# Patient Record
Sex: FEMALE | Race: WHITE | NOT HISPANIC OR LATINO
[De-identification: names, ages, dates, MRNs, and addresses within clinical notes are randomized per-mention and may not be internally consistent; named-entity substitution may affect disease eponyms.]

---

## 2018-05-16 ENCOUNTER — APPOINTMENT (OUTPATIENT)
Dept: ORTHOPEDIC SURGERY | Facility: CLINIC | Age: 68
End: 2018-05-16
Payer: MEDICARE

## 2018-05-16 VITALS — BODY MASS INDEX: 25.71 KG/M2 | HEIGHT: 66 IN | WEIGHT: 160 LBS

## 2018-05-16 DIAGNOSIS — Z78.9 OTHER SPECIFIED HEALTH STATUS: ICD-10-CM

## 2018-05-16 DIAGNOSIS — M25.561 PAIN IN RIGHT KNEE: ICD-10-CM

## 2018-05-16 PROCEDURE — 73560 X-RAY EXAM OF KNEE 1 OR 2: CPT | Mod: LT

## 2018-05-16 PROCEDURE — 20610 DRAIN/INJ JOINT/BURSA W/O US: CPT | Mod: RT

## 2018-05-16 PROCEDURE — 73562 X-RAY EXAM OF KNEE 3: CPT | Mod: RT

## 2018-05-16 PROCEDURE — 99204 OFFICE O/P NEW MOD 45 MIN: CPT | Mod: 25

## 2018-08-23 ENCOUNTER — OUTPATIENT (OUTPATIENT)
Dept: OUTPATIENT SERVICES | Facility: HOSPITAL | Age: 68
LOS: 1 days | Discharge: HOME | End: 2018-08-23

## 2018-08-23 VITALS
HEART RATE: 103 BPM | OXYGEN SATURATION: 96 % | WEIGHT: 152.12 LBS | RESPIRATION RATE: 18 BRPM | TEMPERATURE: 99 F | SYSTOLIC BLOOD PRESSURE: 154 MMHG | DIASTOLIC BLOOD PRESSURE: 96 MMHG | HEIGHT: 65 IN

## 2018-08-23 DIAGNOSIS — F41.9 ANXIETY DISORDER, UNSPECIFIED: ICD-10-CM

## 2018-08-23 DIAGNOSIS — Z01.818 ENCOUNTER FOR OTHER PREPROCEDURAL EXAMINATION: ICD-10-CM

## 2018-08-23 DIAGNOSIS — Z96.659 PRESENCE OF UNSPECIFIED ARTIFICIAL KNEE JOINT: Chronic | ICD-10-CM

## 2018-08-23 DIAGNOSIS — M25.569 PAIN IN UNSPECIFIED KNEE: ICD-10-CM

## 2018-08-23 LAB
ANION GAP SERPL CALC-SCNC: 16 MMOL/L — SIGNIFICANT CHANGE UP (ref 5–17)
APPEARANCE UR: CLEAR — SIGNIFICANT CHANGE UP
APTT BLD: 29.7 SEC — SIGNIFICANT CHANGE UP (ref 27.5–37.4)
BASOPHILS # BLD AUTO: 0.09 K/UL — SIGNIFICANT CHANGE UP (ref 0–0.2)
BASOPHILS NFR BLD AUTO: 0.6 % — SIGNIFICANT CHANGE UP (ref 0–2)
BILIRUB UR-MCNC: ABNORMAL
BUN SERPL-MCNC: 13 MG/DL — SIGNIFICANT CHANGE UP (ref 7–23)
CALCIUM SERPL-MCNC: 8.8 MG/DL — SIGNIFICANT CHANGE UP (ref 8.5–10.1)
CHLORIDE SERPL-SCNC: 97 MMOL/L — SIGNIFICANT CHANGE UP (ref 96–108)
CO2 SERPL-SCNC: 26 MMOL/L — SIGNIFICANT CHANGE UP (ref 22–31)
COLOR SPEC: YELLOW — SIGNIFICANT CHANGE UP
CREAT SERPL-MCNC: 0.74 MG/DL — SIGNIFICANT CHANGE UP (ref 0.5–1.3)
DIFF PNL FLD: ABNORMAL
EOSINOPHIL # BLD AUTO: 0.01 K/UL — SIGNIFICANT CHANGE UP (ref 0–0.5)
EOSINOPHIL NFR BLD AUTO: 0.1 % — SIGNIFICANT CHANGE UP (ref 0–6)
GLUCOSE SERPL-MCNC: 105 MG/DL — HIGH (ref 70–99)
GLUCOSE UR QL: NEGATIVE MG/DL — SIGNIFICANT CHANGE UP
HBA1C BLD-MCNC: 5.3 % — SIGNIFICANT CHANGE UP (ref 4–5.6)
HCT VFR BLD CALC: 44 % — SIGNIFICANT CHANGE UP (ref 34.5–45)
HGB BLD-MCNC: 15.3 G/DL — SIGNIFICANT CHANGE UP (ref 11.5–15.5)
IMM GRANULOCYTES NFR BLD AUTO: 0.3 % — SIGNIFICANT CHANGE UP (ref 0–1.5)
INR BLD: 1.04 RATIO — SIGNIFICANT CHANGE UP (ref 0.88–1.16)
KETONES UR-MCNC: ABNORMAL
LEUKOCYTE ESTERASE UR-ACNC: ABNORMAL
LYMPHOCYTES # BLD AUTO: 15.6 % — SIGNIFICANT CHANGE UP (ref 13–44)
LYMPHOCYTES # BLD AUTO: 2.42 K/UL — SIGNIFICANT CHANGE UP (ref 1–3.3)
MCHC RBC-ENTMCNC: 31 PG — SIGNIFICANT CHANGE UP (ref 27–34)
MCHC RBC-ENTMCNC: 34.8 GM/DL — SIGNIFICANT CHANGE UP (ref 32–36)
MCV RBC AUTO: 89.2 FL — SIGNIFICANT CHANGE UP (ref 80–100)
MONOCYTES # BLD AUTO: 0.84 K/UL — SIGNIFICANT CHANGE UP (ref 0–0.9)
MONOCYTES NFR BLD AUTO: 5.4 % — SIGNIFICANT CHANGE UP (ref 2–14)
NEUTROPHILS # BLD AUTO: 12.09 K/UL — HIGH (ref 1.8–7.4)
NEUTROPHILS NFR BLD AUTO: 78 % — HIGH (ref 43–77)
NITRITE UR-MCNC: POSITIVE
PH UR: 6 — SIGNIFICANT CHANGE UP (ref 5–8)
PLATELET # BLD AUTO: 450 K/UL — HIGH (ref 150–400)
POTASSIUM SERPL-MCNC: 3.6 MMOL/L — SIGNIFICANT CHANGE UP (ref 3.5–5.3)
POTASSIUM SERPL-SCNC: 3.6 MMOL/L — SIGNIFICANT CHANGE UP (ref 3.5–5.3)
PROT UR-MCNC: 30 MG/DL
PROTHROM AB SERPL-ACNC: 11.4 SEC — SIGNIFICANT CHANGE UP (ref 9.8–12.7)
RBC # BLD: 4.93 M/UL — SIGNIFICANT CHANGE UP (ref 3.8–5.2)
RBC # FLD: 12.2 % — SIGNIFICANT CHANGE UP (ref 10.3–14.5)
SODIUM SERPL-SCNC: 139 MMOL/L — SIGNIFICANT CHANGE UP (ref 135–145)
SP GR SPEC: 1.03 — HIGH (ref 1.01–1.02)
UROBILINOGEN FLD QL: 1 MG/DL
WBC # BLD: 15.5 K/UL — HIGH (ref 3.8–10.5)
WBC # FLD AUTO: 15.5 K/UL — HIGH (ref 3.8–10.5)

## 2018-08-23 RX ORDER — SODIUM CHLORIDE 9 MG/ML
3 INJECTION INTRAMUSCULAR; INTRAVENOUS; SUBCUTANEOUS EVERY 8 HOURS
Qty: 0 | Refills: 0 | Status: DISCONTINUED | OUTPATIENT
Start: 2018-09-06 | End: 2018-09-10

## 2018-08-23 NOTE — OCCUPATIONAL THERAPY INITIAL EVALUATION ADULT - SOCIAL CONCERNS
Complex psychosocial needs/coping issues/As per patient, "My  works full time and I will have no one around after surgery to help me with any of my daily tasks and needs".

## 2018-08-23 NOTE — PHYSICAL THERAPY INITIAL EVALUATION ADULT - ADDITIONAL COMMENTS
Pt lives in a private house c one step to get in and there is 3 flights of stairs c willie rails to negotiate at home.  will be able to assist in post-op care at home. Pt has high anxiety.

## 2018-08-23 NOTE — PHYSICAL THERAPY INITIAL EVALUATION ADULT - LIVES WITH, PROFILE
New onset T2D referred to ED due to high blood glucose value in blood work after office visit yesterday spouse

## 2018-08-23 NOTE — H&P PST ADULT - HISTORY OF PRESENT ILLNESS
66 yo female s/p right knee arthroplasty 2014- received poor post op therapy - had several falls and surgery to the knee. evaluated - fibrosis of ortho device- scheduled for  revision of right knee arhroplasty

## 2018-08-23 NOTE — PHYSICAL THERAPY INITIAL EVALUATION ADULT - CRITERIA FOR SKILLED THERAPEUTIC INTERVENTIONS
impairments found/anticipated equipment needs at discharge/functional limitations in following categories/risk reduction/prevention/therapy frequency

## 2018-08-23 NOTE — H&P PST ADULT - ASSESSMENT
right knee ortho device fibrosis  CAPRINI SCORE    AGE RELATED RISK FACTORS                                                       MOBILITY RELATED FACTORS  [ ] Age 41-60 years                                            (1 Point)                  [ ] Bed rest                                                        (1 Point)  [x ] Age: 61-74 years                                           (2 Points)                [ ] Plaster cast                                                   (2 Points)  [ ] Age= 75 years                                              (3 Points)                 [ ] Bed bound for more than 72 hours                   (2 Points)    DISEASE RELATED RISK FACTORS                                               GENDER SPECIFIC FACTORS  [ ] Edema in the lower extremities                       (1 Point)                  [ ] Pregnancy                                                     (1 Point)  [ ] Varicose veins                                               (1 Point)                  [ ] Post-partum < 6 weeks                                   (1 Point)             [ x] BMI > 25 Kg/m2                                            (1 Point)                  [ ] Hormonal therapy  or oral contraception            (1 Point)                 [ ] Sepsis (in the previous month)                        (1 Point)                  [ ] History of pregnancy complications  [ ] Pneumonia or serious lung disease                                               [ ] Unexplained or recurrent                       (1 Point)           (in the previous month)                               (1 Point)  [ ] Abnormal pulmonary function test                     (1 Point)                 SURGERY RELATED RISK FACTORS  [ ] Acute myocardial infarction                              (1 Point)                 [ ]  Section                                            (1 Point)  [ ] Congestive heart failure (in the previous month)  (1 Point)                 [ ] Minor surgery                                                 (1 Point)   [ ] Inflammatory bowel disease                             (1 Point)                 [ ] Arthroscopic surgery                                        (2 Points)  [ ] Central venous access                                    (2 Points)                [ ] General surgery lasting more than 45 minutes   (2 Points)       [ ] Stroke (in the previous month)                          (5 Points)               [x ] Elective arthroplasty                                        (5 Points)                                                                                                                                               HEMATOLOGY RELATED FACTORS                                                 TRAUMA RELATED RISK FACTORS  [ ] Prior episodes of VTE                                     (3 Points)                 [ ] Fracture of the hip, pelvis, or leg                       (5 Points)  [ ] Positive family history for VTE                         (3 Points)                 [ ] Acute spinal cord injury (in the previous month)  (5 Points)  [ ] Prothrombin 91055 A                                      (3 Points)                 [ ] Paralysis  (less than 1 month)                          (5 Points)  [ ] Factor V Leiden                                             (3 Points)                 [ ] Multiple Trauma within 1 month                         (5 Points)  [ ] Lupus anticoagulants                                     (3 Points)                                                           [ ] Anticardiolipin antibodies                                (3 Points)                                                       [ ] High homocysteine in the blood                      (3 Points)                                             [ ] Other congenital or acquired thrombophilia       (3 Points)                                                [ ] Heparin induced thrombocytopenia                  (3 Points)                                          Total Score [      8    ]

## 2018-08-23 NOTE — OCCUPATIONAL THERAPY INITIAL EVALUATION ADULT - STANDING BALANCE: STATIC
good balance conducted a detailed discussion... I had a detailed discussion with the patient and/or guardian regarding the historical points, exam findings, and any diagnostic results supporting the discharge/admit diagnosis.

## 2018-08-23 NOTE — PHYSICAL THERAPY INITIAL EVALUATION ADULT - IMPAIRMENTS FOUND, PT EVAL
aerobic capacity/endurance/gait, locomotion, and balance/muscle strength/ROM/ergonomics and body mechanics

## 2018-08-23 NOTE — OCCUPATIONAL THERAPY INITIAL EVALUATION ADULT - TRANSFER SAFETY CONCERNS NOTED: SIT/STAND, REHAB EVAL
decreased weight-shifting ability/decreased balance during turns/decreased step length/decreased sequencing ability

## 2018-08-23 NOTE — OCCUPATIONAL THERAPY INITIAL EVALUATION ADULT - ADDITIONAL COMMENTS
Patient lives in a private house with no steps to enter. Once inside, the patient has 9 steps (+) bilateral handrails to a platform and then another 9 steps (+) bilateral handrails to second floor where bedroom and bathroom is. The patient bathroom has a shower stall with a regular toilet. The patient reports having no DME inside of that bathroom. The patient ambulates with no device and does not own any devices for ambulation. The patient is R handed, wears glasses all the time and drives.

## 2018-08-23 NOTE — PATIENT PROFILE ADULT. - PSH
H/O: Hysterectomy (ICD9 V88.01)    H/O: Knee Surgery (ICD9 V45.89)    History of Tonsillectomy (ICD9 V45.89)    S/P knee replacement  Right  ( 2014 )

## 2018-08-23 NOTE — OCCUPATIONAL THERAPY INITIAL EVALUATION ADULT - FINE MOTOR COORDINATION, FINE MOTOR COORDINATION TESTS, OT EVAL
Patient-specific activity scoring scheme (Point to one number): 0 -------5-------- 10 (0) =Unable to perform activity (10) -- Able to perform activity at the same level as before injury or problem. Activity: Standing__5____, Activity: Walking_____5_______

## 2018-08-23 NOTE — H&P PST ADULT - PSH
H/O: Hysterectomy (ICD9 V88.01)    H/O: Knee Surgery (ICD9 V45.89)  several times  History of Tonsillectomy (ICD9 V45.89)    S/P knee replacement  Right  ( 2014 )

## 2018-08-24 LAB
CULTURE RESULTS: SIGNIFICANT CHANGE UP
MRSA PCR RESULT.: SIGNIFICANT CHANGE UP
S AUREUS DNA NOSE QL NAA+PROBE: SIGNIFICANT CHANGE UP
SPECIMEN SOURCE: SIGNIFICANT CHANGE UP

## 2018-09-05 RX ORDER — METOCLOPRAMIDE HCL 10 MG
10 TABLET ORAL EVERY 6 HOURS
Qty: 0 | Refills: 0 | Status: DISCONTINUED | OUTPATIENT
Start: 2018-09-06 | End: 2018-09-10

## 2018-09-05 RX ORDER — ONDANSETRON 8 MG/1
4 TABLET, FILM COATED ORAL EVERY 6 HOURS
Qty: 0 | Refills: 0 | Status: DISCONTINUED | OUTPATIENT
Start: 2018-09-06 | End: 2018-09-10

## 2018-09-05 RX ORDER — SENNA PLUS 8.6 MG/1
2 TABLET ORAL AT BEDTIME
Qty: 0 | Refills: 0 | Status: DISCONTINUED | OUTPATIENT
Start: 2018-09-06 | End: 2018-09-10

## 2018-09-05 RX ORDER — DOCUSATE SODIUM 100 MG
100 CAPSULE ORAL THREE TIMES A DAY
Qty: 0 | Refills: 0 | Status: DISCONTINUED | OUTPATIENT
Start: 2018-09-06 | End: 2018-09-10

## 2018-09-05 RX ORDER — ACETAMINOPHEN 500 MG
975 TABLET ORAL EVERY 8 HOURS
Qty: 0 | Refills: 0 | Status: COMPLETED | OUTPATIENT
Start: 2018-09-06 | End: 2018-09-08

## 2018-09-05 RX ORDER — MAGNESIUM HYDROXIDE 400 MG/1
30 TABLET, CHEWABLE ORAL DAILY
Qty: 0 | Refills: 0 | Status: DISCONTINUED | OUTPATIENT
Start: 2018-09-06 | End: 2018-09-10

## 2018-09-05 RX ORDER — ACETAMINOPHEN 500 MG
650 TABLET ORAL EVERY 6 HOURS
Qty: 0 | Refills: 0 | Status: DISCONTINUED | OUTPATIENT
Start: 2018-09-06 | End: 2018-09-10

## 2018-09-05 RX ORDER — POLYETHYLENE GLYCOL 3350 17 G/17G
17 POWDER, FOR SOLUTION ORAL DAILY
Qty: 0 | Refills: 0 | Status: DISCONTINUED | OUTPATIENT
Start: 2018-09-06 | End: 2018-09-10

## 2018-09-05 RX ORDER — OXYCODONE HYDROCHLORIDE 5 MG/1
10 TABLET ORAL EVERY 4 HOURS
Qty: 0 | Refills: 0 | Status: DISCONTINUED | OUTPATIENT
Start: 2018-09-06 | End: 2018-09-08

## 2018-09-05 RX ORDER — OXYCODONE HYDROCHLORIDE 5 MG/1
5 TABLET ORAL EVERY 4 HOURS
Qty: 0 | Refills: 0 | Status: DISCONTINUED | OUTPATIENT
Start: 2018-09-06 | End: 2018-09-08

## 2018-09-05 RX ORDER — TRAZODONE HCL 50 MG
50 TABLET ORAL AT BEDTIME
Qty: 0 | Refills: 0 | Status: DISCONTINUED | OUTPATIENT
Start: 2018-09-06 | End: 2018-09-10

## 2018-09-05 RX ORDER — ENOXAPARIN SODIUM 100 MG/ML
40 INJECTION SUBCUTANEOUS EVERY 24 HOURS
Qty: 0 | Refills: 0 | Status: DISCONTINUED | OUTPATIENT
Start: 2018-09-07 | End: 2018-09-10

## 2018-09-05 RX ORDER — BENZOCAINE AND MENTHOL 5; 1 G/100ML; G/100ML
1 LIQUID ORAL EVERY 6 HOURS
Qty: 0 | Refills: 0 | Status: DISCONTINUED | OUTPATIENT
Start: 2018-09-06 | End: 2018-09-10

## 2018-09-06 ENCOUNTER — APPOINTMENT (OUTPATIENT)
Dept: ORTHOPEDIC SURGERY | Facility: HOSPITAL | Age: 68
End: 2018-09-06

## 2018-09-06 ENCOUNTER — INPATIENT (INPATIENT)
Facility: HOSPITAL | Age: 68
LOS: 3 days | Discharge: NURSING, REHAB OR LONG-TERM CARE | End: 2018-09-10
Attending: ORTHOPAEDIC SURGERY | Admitting: ORTHOPAEDIC SURGERY
Payer: MEDICARE

## 2018-09-06 ENCOUNTER — RESULT REVIEW (OUTPATIENT)
Age: 68
End: 2018-09-06

## 2018-09-06 VITALS
OXYGEN SATURATION: 97 % | TEMPERATURE: 97 F | HEIGHT: 65 IN | WEIGHT: 152.12 LBS | SYSTOLIC BLOOD PRESSURE: 120 MMHG | RESPIRATION RATE: 17 BRPM | HEART RATE: 69 BPM | DIASTOLIC BLOOD PRESSURE: 66 MMHG

## 2018-09-06 DIAGNOSIS — T84.092A OTHER MECHANICAL COMPLICATION OF INTERNAL RIGHT KNEE PROSTHESIS, INITIAL ENCOUNTER: ICD-10-CM
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
## 2018-09-06 DIAGNOSIS — F41.9 ANXIETY DISORDER, UNSPECIFIED: ICD-10-CM

## 2018-09-06 DIAGNOSIS — Z96.659 PRESENCE OF UNSPECIFIED ARTIFICIAL KNEE JOINT: Chronic | ICD-10-CM

## 2018-09-06 DIAGNOSIS — F32.9 MAJOR DEPRESSIVE DISORDER, SINGLE EPISODE, UNSPECIFIED: ICD-10-CM

## 2018-09-06 DIAGNOSIS — M24.661 ANKYLOSIS, RIGHT KNEE: ICD-10-CM

## 2018-09-06 DIAGNOSIS — Z98.890 OTHER SPECIFIED POSTPROCEDURAL STATES: Chronic | ICD-10-CM

## 2018-09-06 LAB
ANION GAP SERPL CALC-SCNC: 7 MMOL/L — SIGNIFICANT CHANGE UP (ref 5–17)
BASOPHILS # BLD AUTO: 0.05 K/UL — SIGNIFICANT CHANGE UP (ref 0–0.2)
BASOPHILS NFR BLD AUTO: 0.7 % — SIGNIFICANT CHANGE UP (ref 0–2)
BUN SERPL-MCNC: 17 MG/DL — SIGNIFICANT CHANGE UP (ref 7–23)
CALCIUM SERPL-MCNC: 8.8 MG/DL — SIGNIFICANT CHANGE UP (ref 8.5–10.1)
CHLORIDE SERPL-SCNC: 109 MMOL/L — HIGH (ref 96–108)
CO2 SERPL-SCNC: 28 MMOL/L — SIGNIFICANT CHANGE UP (ref 22–31)
CREAT SERPL-MCNC: 0.87 MG/DL — SIGNIFICANT CHANGE UP (ref 0.5–1.3)
EOSINOPHIL # BLD AUTO: 0.17 K/UL — SIGNIFICANT CHANGE UP (ref 0–0.5)
EOSINOPHIL NFR BLD AUTO: 2.4 % — SIGNIFICANT CHANGE UP (ref 0–6)
GLUCOSE SERPL-MCNC: 146 MG/DL — HIGH (ref 70–99)
HCT VFR BLD CALC: 38.1 % — SIGNIFICANT CHANGE UP (ref 34.5–45)
HCT VFR BLD CALC: 40.1 % — SIGNIFICANT CHANGE UP (ref 34.5–45)
HGB BLD-MCNC: 12.6 G/DL — SIGNIFICANT CHANGE UP (ref 11.5–15.5)
HGB BLD-MCNC: 13.1 G/DL — SIGNIFICANT CHANGE UP (ref 11.5–15.5)
IMM GRANULOCYTES NFR BLD AUTO: 0.3 % — SIGNIFICANT CHANGE UP (ref 0–1.5)
INR BLD: 1.01 RATIO — SIGNIFICANT CHANGE UP (ref 0.88–1.16)
LYMPHOCYTES # BLD AUTO: 2.05 K/UL — SIGNIFICANT CHANGE UP (ref 1–3.3)
LYMPHOCYTES # BLD AUTO: 29 % — SIGNIFICANT CHANGE UP (ref 13–44)
MCHC RBC-ENTMCNC: 30.7 PG — SIGNIFICANT CHANGE UP (ref 27–34)
MCHC RBC-ENTMCNC: 31 PG — SIGNIFICANT CHANGE UP (ref 27–34)
MCHC RBC-ENTMCNC: 32.7 GM/DL — SIGNIFICANT CHANGE UP (ref 32–36)
MCHC RBC-ENTMCNC: 33.1 GM/DL — SIGNIFICANT CHANGE UP (ref 32–36)
MCV RBC AUTO: 93.8 FL — SIGNIFICANT CHANGE UP (ref 80–100)
MCV RBC AUTO: 93.9 FL — SIGNIFICANT CHANGE UP (ref 80–100)
MONOCYTES # BLD AUTO: 0.56 K/UL — SIGNIFICANT CHANGE UP (ref 0–0.9)
MONOCYTES NFR BLD AUTO: 7.9 % — SIGNIFICANT CHANGE UP (ref 2–14)
NEUTROPHILS # BLD AUTO: 4.21 K/UL — SIGNIFICANT CHANGE UP (ref 1.8–7.4)
NEUTROPHILS NFR BLD AUTO: 59.7 % — SIGNIFICANT CHANGE UP (ref 43–77)
NRBC # BLD: 0 /100 WBCS — SIGNIFICANT CHANGE UP (ref 0–0)
NRBC # BLD: 0 /100 WBCS — SIGNIFICANT CHANGE UP (ref 0–0)
PLATELET # BLD AUTO: 284 K/UL — SIGNIFICANT CHANGE UP (ref 150–400)
PLATELET # BLD AUTO: 301 K/UL — SIGNIFICANT CHANGE UP (ref 150–400)
POTASSIUM SERPL-MCNC: 4.2 MMOL/L — SIGNIFICANT CHANGE UP (ref 3.5–5.3)
POTASSIUM SERPL-SCNC: 4.2 MMOL/L — SIGNIFICANT CHANGE UP (ref 3.5–5.3)
PROTHROM AB SERPL-ACNC: 11 SEC — SIGNIFICANT CHANGE UP (ref 9.8–12.7)
RBC # BLD: 4.06 M/UL — SIGNIFICANT CHANGE UP (ref 3.8–5.2)
RBC # BLD: 4.27 M/UL — SIGNIFICANT CHANGE UP (ref 3.8–5.2)
RBC # FLD: 12.3 % — SIGNIFICANT CHANGE UP (ref 10.3–14.5)
RBC # FLD: 12.4 % — SIGNIFICANT CHANGE UP (ref 10.3–14.5)
SODIUM SERPL-SCNC: 144 MMOL/L — SIGNIFICANT CHANGE UP (ref 135–145)
WBC # BLD: 11.23 K/UL — HIGH (ref 3.8–10.5)
WBC # BLD: 7.06 K/UL — SIGNIFICANT CHANGE UP (ref 3.8–10.5)
WBC # FLD AUTO: 11.23 K/UL — HIGH (ref 3.8–10.5)
WBC # FLD AUTO: 7.06 K/UL — SIGNIFICANT CHANGE UP (ref 3.8–10.5)

## 2018-09-06 PROCEDURE — 27487 REVISE/REPLACE KNEE JOINT: CPT | Mod: RT

## 2018-09-06 PROCEDURE — 88305 TISSUE EXAM BY PATHOLOGIST: CPT | Mod: 26

## 2018-09-06 PROCEDURE — 73560 X-RAY EXAM OF KNEE 1 OR 2: CPT | Mod: 26,RT

## 2018-09-06 PROCEDURE — 88300 SURGICAL PATH GROSS: CPT | Mod: 26

## 2018-09-06 PROCEDURE — 88331 PATH CONSLTJ SURG 1 BLK 1SPC: CPT | Mod: 26

## 2018-09-06 RX ORDER — SODIUM CHLORIDE 9 MG/ML
1000 INJECTION, SOLUTION INTRAVENOUS
Qty: 0 | Refills: 0 | Status: DISCONTINUED | OUTPATIENT
Start: 2018-09-06 | End: 2018-09-06

## 2018-09-06 RX ORDER — ONDANSETRON 8 MG/1
4 TABLET, FILM COATED ORAL ONCE
Qty: 0 | Refills: 0 | Status: DISCONTINUED | OUTPATIENT
Start: 2018-09-06 | End: 2018-09-06

## 2018-09-06 RX ORDER — OXYCODONE HYDROCHLORIDE 5 MG/1
20 TABLET ORAL ONCE
Qty: 0 | Refills: 0 | Status: DISCONTINUED | OUTPATIENT
Start: 2018-09-06 | End: 2018-09-06

## 2018-09-06 RX ORDER — HYDROMORPHONE HYDROCHLORIDE 2 MG/ML
0.5 INJECTION INTRAMUSCULAR; INTRAVENOUS; SUBCUTANEOUS EVERY 4 HOURS
Qty: 0 | Refills: 0 | Status: DISCONTINUED | OUTPATIENT
Start: 2018-09-06 | End: 2018-09-06

## 2018-09-06 RX ORDER — MORPHINE SULFATE 50 MG/1
3 CAPSULE, EXTENDED RELEASE ORAL
Qty: 0 | Refills: 0 | Status: DISCONTINUED | OUTPATIENT
Start: 2018-09-06 | End: 2018-09-06

## 2018-09-06 RX ORDER — DEXAMETHASONE 0.5 MG/5ML
10 ELIXIR ORAL ONCE
Qty: 0 | Refills: 0 | Status: COMPLETED | OUTPATIENT
Start: 2018-09-07 | End: 2018-09-07

## 2018-09-06 RX ORDER — HYDROMORPHONE HYDROCHLORIDE 2 MG/ML
1 INJECTION INTRAMUSCULAR; INTRAVENOUS; SUBCUTANEOUS EVERY 4 HOURS
Qty: 0 | Refills: 0 | Status: DISCONTINUED | OUTPATIENT
Start: 2018-09-06 | End: 2018-09-08

## 2018-09-06 RX ORDER — ACETAMINOPHEN 500 MG
650 TABLET ORAL ONCE
Qty: 0 | Refills: 0 | Status: COMPLETED | OUTPATIENT
Start: 2018-09-06 | End: 2018-09-06

## 2018-09-06 RX ORDER — CELECOXIB 200 MG/1
200 CAPSULE ORAL ONCE
Qty: 0 | Refills: 0 | Status: COMPLETED | OUTPATIENT
Start: 2018-09-06 | End: 2018-09-06

## 2018-09-06 RX ORDER — CEFAZOLIN SODIUM 1 G
2000 VIAL (EA) INJECTION EVERY 8 HOURS
Qty: 0 | Refills: 0 | Status: COMPLETED | OUTPATIENT
Start: 2018-09-06 | End: 2018-09-07

## 2018-09-06 RX ORDER — ACETAMINOPHEN 500 MG
1000 TABLET ORAL ONCE
Qty: 0 | Refills: 0 | Status: COMPLETED | OUTPATIENT
Start: 2018-09-06 | End: 2018-09-06

## 2018-09-06 RX ORDER — INFLUENZA VIRUS VACCINE 15; 15; 15; 15 UG/.5ML; UG/.5ML; UG/.5ML; UG/.5ML
0.5 SUSPENSION INTRAMUSCULAR ONCE
Qty: 0 | Refills: 0 | Status: COMPLETED | OUTPATIENT
Start: 2018-09-06 | End: 2018-09-10

## 2018-09-06 RX ADMIN — Medication 1 TABLET(S): at 21:10

## 2018-09-06 RX ADMIN — OXYCODONE HYDROCHLORIDE 10 MILLIGRAM(S): 5 TABLET ORAL at 19:11

## 2018-09-06 RX ADMIN — SODIUM CHLORIDE 3 MILLILITER(S): 9 INJECTION INTRAMUSCULAR; INTRAVENOUS; SUBCUTANEOUS at 21:11

## 2018-09-06 RX ADMIN — MORPHINE SULFATE 3 MILLIGRAM(S): 50 CAPSULE, EXTENDED RELEASE ORAL at 17:00

## 2018-09-06 RX ADMIN — HYDROMORPHONE HYDROCHLORIDE 1 MILLIGRAM(S): 2 INJECTION INTRAMUSCULAR; INTRAVENOUS; SUBCUTANEOUS at 21:37

## 2018-09-06 RX ADMIN — HYDROMORPHONE HYDROCHLORIDE 0.5 MILLIGRAM(S): 2 INJECTION INTRAMUSCULAR; INTRAVENOUS; SUBCUTANEOUS at 17:43

## 2018-09-06 RX ADMIN — Medication 100 MILLIGRAM(S): at 20:51

## 2018-09-06 RX ADMIN — SODIUM CHLORIDE 100 MILLILITER(S): 9 INJECTION, SOLUTION INTRAVENOUS at 16:41

## 2018-09-06 RX ADMIN — MORPHINE SULFATE 3 MILLIGRAM(S): 50 CAPSULE, EXTENDED RELEASE ORAL at 16:39

## 2018-09-06 RX ADMIN — Medication 100 MILLIGRAM(S): at 21:10

## 2018-09-06 RX ADMIN — Medication 400 MILLIGRAM(S): at 16:40

## 2018-09-06 RX ADMIN — Medication 975 MILLIGRAM(S): at 22:10

## 2018-09-06 RX ADMIN — OXYCODONE HYDROCHLORIDE 20 MILLIGRAM(S): 5 TABLET ORAL at 10:19

## 2018-09-06 RX ADMIN — MORPHINE SULFATE 3 MILLIGRAM(S): 50 CAPSULE, EXTENDED RELEASE ORAL at 16:20

## 2018-09-06 RX ADMIN — Medication 975 MILLIGRAM(S): at 21:10

## 2018-09-06 RX ADMIN — HYDROMORPHONE HYDROCHLORIDE 0.5 MILLIGRAM(S): 2 INJECTION INTRAMUSCULAR; INTRAVENOUS; SUBCUTANEOUS at 18:00

## 2018-09-06 RX ADMIN — OXYCODONE HYDROCHLORIDE 10 MILLIGRAM(S): 5 TABLET ORAL at 20:15

## 2018-09-06 RX ADMIN — Medication 50 MILLIGRAM(S): at 21:11

## 2018-09-06 RX ADMIN — MORPHINE SULFATE 3 MILLIGRAM(S): 50 CAPSULE, EXTENDED RELEASE ORAL at 16:40

## 2018-09-06 RX ADMIN — HYDROMORPHONE HYDROCHLORIDE 1 MILLIGRAM(S): 2 INJECTION INTRAMUSCULAR; INTRAVENOUS; SUBCUTANEOUS at 21:53

## 2018-09-06 NOTE — CONSULT NOTE ADULT - SUBJECTIVE AND OBJECTIVE BOX
Chief Complaint:  Patient is a 67y old  Female who presents with a chief complaint of Im having revision of right knee arthroplasty (06 Sep 2018 09:24)      HPI:  Currently no sob or chest pain or palpitation . No cough or fever . No nausea . Tolerating orally . Voiding . Participated in PT/OT . On Lovenox . + Pain .      Review of Systems:    General:  No wt loss, fevers, chills, night sweats  Eyes:  Good vision, no reported pain  ENT:  No sore throat, pain, runny nose, dysphagia  CV:  No pain, palpitations, hypo/hypertension  Resp:  No dyspnea, cough, tachypnea, wheezing  GI:  No pain, nausea, vomiting, diarrhea .  :  No pain, bleeding, incontinence, nocturia  Muscle:  No pain, weakness  Breast:  No pain, abscess, mass, discharge  Neuro:  No weakness, tingling, memory problems  Psych:  No fatigue, insomnia, mood problems, depression  Endocrine:  No polyuria, polydypsia, cold/heat intolerance  Heme:  No petechiae, ecchymosis, easy bruisability  Skin:  No rash, tattoos, scars, edema    Relevant Family History: NC . Allergy : NKDA .      Relevant Social History:  No smoking .    PMH : NC . Meds : Decadron . Lovenox .    Physical Exam:      Vital Signs:  Vital Signs Last 24 Hrs  T(C): 36.6 (06 Sep 2018 20:19), Max: 36.6 (06 Sep 2018 16:45)  T(F): 97.9 (06 Sep 2018 20:19), Max: 97.9 (06 Sep 2018 20:19)  HR: 85 (06 Sep 2018 20:19) (69 - 89)  BP: 112/64 (06 Sep 2018 21:14) (110/60 - 163/86)  BP(mean): --  RR: 16 (06 Sep 2018 20:19) (14 - 18)  SpO2: 97% (06 Sep 2018 20:19) (95% - 99%)  Daily Height in cm: 165.1 (06 Sep 2018 17:30)    Daily   I&O's Summary    06 Sep 2018 07:01  -  06 Sep 2018 22:25  --------------------------------------------------------  IN: 200 mL / OUT: 5 mL / NET: 195 mL        General:  Appears stated age, well-groomed, well-nourished, no distress  HEENT:  NC/AT, patent nares w/ pink mucosa, OP clear w/o lesions, PERRL, EOMI, conjunctivae clear, no thyromegaly, nodules, adenopathy, no JVD  Chest:  Full & symmetric excursion, no increased effort, breath sounds clear  Cardiovascular:  Regular rhythm, S1, S2, no murmur/rub/S3/S4, no carotid/femoral/abdominal bruit, radial/pedal pulses 2+.  Abdomen:  Soft, non-tender, non-distended, normoactive bowel sounds, no HSM  Extremities:  + ACE wrap right knee + Drain  right knee . + Pulse .  Skin:  No rash/erythema/ecchymoses/petechiae/wounds/abscess/warm/dry  Musculoskeletal:  Intact .  Neuro/Psych:  Alert, oriented, normal and symmetric strength in UEs, LEs .     Laboratory:                            13.1   11.23 )-----------( 301      ( 06 Sep 2018 16:59 )             40.1     09-06    144  |  109<H>  |  17  ----------------------------<  146<H>  4.2   |  28  |  0.87    Ca    8.8      06 Sep 2018 16:59            CAPILLARY BLOOD GLUCOSE          PT/INR - ( 06 Sep 2018 16:59 )   PT: 11.0 sec;   INR: 1.01 ratio               Imaging:      Assessment: S/P right knee replacement/revision . Leukocytosis .       Plan: PT/OT . Pain control . Incentive spirometry . On Lovenox . Slightly elevated WBC count Stress + Decadron . Trend WBC . D/W the patient .

## 2018-09-06 NOTE — PROGRESS NOTE ADULT - SUBJECTIVE AND OBJECTIVE BOX
Orthopedic Post-op Check    Pt S/E at bedside, tolerated procedure well, pain controlled    Vital Signs Last 24 Hrs  T(C): 36.6 (06 Sep 2018 16:45), Max: 36.6 (06 Sep 2018 16:45)  T(F): 97.8 (06 Sep 2018 16:45), Max: 97.8 (06 Sep 2018 16:45)  HR: 72 (06 Sep 2018 16:45) (69 - 74)  BP: 143/78 (06 Sep 2018 16:45) (120/66 - 163/86)  BP(mean): --  RR: 17 (06 Sep 2018 16:45) (14 - 18)  SpO2: 96% (06 Sep 2018 16:45) (96% - 99%)    Gen: NAD    Right Lower Extremity:  Dressing clean dry intact, +HMV  +EHL/FHL/TA/GS  SILT L3-S1  +DP/PT Pulses  Compartments soft  No calf TTP B/L

## 2018-09-06 NOTE — PHYSICAL THERAPY INITIAL EVALUATION ADULT - ADDITIONAL COMMENTS
As per pre-op note: Pt lives in a private house c one step, no rails, to get in and there is 2 flights of stairs c willie rails to negotiate at home.  will be able to assist in post-op care at home. Pt has high anxiety.

## 2018-09-06 NOTE — PHYSICAL THERAPY INITIAL EVALUATION ADULT - CRITERIA FOR SKILLED THERAPEUTIC INTERVENTIONS
impairments found/risk reduction/prevention/rehab potential/therapy frequency/anticipated equipment needs at discharge/anticipated discharge recommendation/predicted duration of therapy intervention/functional limitations in following categories

## 2018-09-06 NOTE — BRIEF OPERATIVE NOTE - PROCEDURE
<<-----Click on this checkbox to enter Procedure Total knee revision  09/06/2018  right knee  Active  RODNEY

## 2018-09-06 NOTE — PROGRESS NOTE ADULT - ASSESSMENT
A/P: 66F s/p revision R TKA POD 0  Analgesia  DVT ppx  WBAT  PT/OT  FU Labs  monitor/record hemovac output  DC planning

## 2018-09-06 NOTE — PATIENT PROFILE ADULT. - PSH
H/O: Hysterectomy (ICD9 V88.01)    H/O: Knee Surgery (ICD9 V45.89)  several times  History of Tonsillectomy (ICD9 V45.89)    S/P ACL repair  right  S/P knee replacement  Right  ( 2014 )

## 2018-09-07 ENCOUNTER — TRANSCRIPTION ENCOUNTER (OUTPATIENT)
Age: 68
End: 2018-09-07

## 2018-09-07 PROBLEM — F32.9 MAJOR DEPRESSIVE DISORDER, SINGLE EPISODE, UNSPECIFIED: Chronic | Status: ACTIVE | Noted: 2018-08-23

## 2018-09-07 PROBLEM — F41.9 ANXIETY DISORDER, UNSPECIFIED: Chronic | Status: ACTIVE | Noted: 2018-08-23

## 2018-09-07 LAB
ANION GAP SERPL CALC-SCNC: 9 MMOL/L — SIGNIFICANT CHANGE UP (ref 5–17)
BUN SERPL-MCNC: 19 MG/DL — SIGNIFICANT CHANGE UP (ref 7–23)
CALCIUM SERPL-MCNC: 8.6 MG/DL — SIGNIFICANT CHANGE UP (ref 8.5–10.1)
CHLORIDE SERPL-SCNC: 104 MMOL/L — SIGNIFICANT CHANGE UP (ref 96–108)
CO2 SERPL-SCNC: 29 MMOL/L — SIGNIFICANT CHANGE UP (ref 22–31)
CREAT SERPL-MCNC: 0.93 MG/DL — SIGNIFICANT CHANGE UP (ref 0.5–1.3)
CULTURE RESULTS: NO GROWTH — SIGNIFICANT CHANGE UP
GLUCOSE SERPL-MCNC: 144 MG/DL — HIGH (ref 70–99)
HCT VFR BLD CALC: 35.8 % — SIGNIFICANT CHANGE UP (ref 34.5–45)
HGB BLD-MCNC: 11.8 G/DL — SIGNIFICANT CHANGE UP (ref 11.5–15.5)
MCHC RBC-ENTMCNC: 31.5 PG — SIGNIFICANT CHANGE UP (ref 27–34)
MCHC RBC-ENTMCNC: 33 GM/DL — SIGNIFICANT CHANGE UP (ref 32–36)
MCV RBC AUTO: 95.5 FL — SIGNIFICANT CHANGE UP (ref 80–100)
NRBC # BLD: 0 /100 WBCS — SIGNIFICANT CHANGE UP (ref 0–0)
PLATELET # BLD AUTO: 297 K/UL — SIGNIFICANT CHANGE UP (ref 150–400)
POTASSIUM SERPL-MCNC: 4.1 MMOL/L — SIGNIFICANT CHANGE UP (ref 3.5–5.3)
POTASSIUM SERPL-SCNC: 4.1 MMOL/L — SIGNIFICANT CHANGE UP (ref 3.5–5.3)
RBC # BLD: 3.75 M/UL — LOW (ref 3.8–5.2)
RBC # FLD: 12.3 % — SIGNIFICANT CHANGE UP (ref 10.3–14.5)
SODIUM SERPL-SCNC: 142 MMOL/L — SIGNIFICANT CHANGE UP (ref 135–145)
SPECIMEN SOURCE: SIGNIFICANT CHANGE UP
WBC # BLD: 18.31 K/UL — HIGH (ref 3.8–10.5)
WBC # FLD AUTO: 18.31 K/UL — HIGH (ref 3.8–10.5)

## 2018-09-07 RX ORDER — ACETAMINOPHEN 500 MG
0 TABLET ORAL
Qty: 0 | Refills: 0 | COMMUNITY

## 2018-09-07 RX ORDER — CELECOXIB 200 MG/1
0 CAPSULE ORAL
Qty: 0 | Refills: 0 | COMMUNITY

## 2018-09-07 RX ORDER — ACETAMINOPHEN 500 MG
1000 TABLET ORAL ONCE
Qty: 0 | Refills: 0 | Status: COMPLETED | OUTPATIENT
Start: 2018-09-07 | End: 2018-09-07

## 2018-09-07 RX ORDER — HYDROMORPHONE HYDROCHLORIDE 2 MG/ML
0.5 INJECTION INTRAMUSCULAR; INTRAVENOUS; SUBCUTANEOUS ONCE
Qty: 0 | Refills: 0 | Status: DISCONTINUED | OUTPATIENT
Start: 2018-09-07 | End: 2018-09-07

## 2018-09-07 RX ORDER — ENOXAPARIN SODIUM 100 MG/ML
40 INJECTION SUBCUTANEOUS
Qty: 0 | Refills: 0 | COMMUNITY
Start: 2018-09-07 | End: 2018-09-20

## 2018-09-07 RX ORDER — OXYCODONE HYDROCHLORIDE 5 MG/1
1 TABLET ORAL
Qty: 0 | Refills: 0 | COMMUNITY
Start: 2018-09-07

## 2018-09-07 RX ADMIN — Medication 100 MILLIGRAM(S): at 21:59

## 2018-09-07 RX ADMIN — OXYCODONE HYDROCHLORIDE 10 MILLIGRAM(S): 5 TABLET ORAL at 01:07

## 2018-09-07 RX ADMIN — Medication 975 MILLIGRAM(S): at 14:00

## 2018-09-07 RX ADMIN — Medication 1 TABLET(S): at 05:16

## 2018-09-07 RX ADMIN — Medication 1 TABLET(S): at 13:09

## 2018-09-07 RX ADMIN — Medication 50 MILLIGRAM(S): at 21:58

## 2018-09-07 RX ADMIN — Medication 975 MILLIGRAM(S): at 05:16

## 2018-09-07 RX ADMIN — SODIUM CHLORIDE 3 MILLILITER(S): 9 INJECTION INTRAMUSCULAR; INTRAVENOUS; SUBCUTANEOUS at 05:18

## 2018-09-07 RX ADMIN — Medication 1 TABLET(S): at 21:59

## 2018-09-07 RX ADMIN — HYDROMORPHONE HYDROCHLORIDE 1 MILLIGRAM(S): 2 INJECTION INTRAMUSCULAR; INTRAVENOUS; SUBCUTANEOUS at 06:15

## 2018-09-07 RX ADMIN — HYDROMORPHONE HYDROCHLORIDE 1 MILLIGRAM(S): 2 INJECTION INTRAMUSCULAR; INTRAVENOUS; SUBCUTANEOUS at 10:17

## 2018-09-07 RX ADMIN — Medication 100 MILLIGRAM(S): at 05:16

## 2018-09-07 RX ADMIN — SODIUM CHLORIDE 3 MILLILITER(S): 9 INJECTION INTRAMUSCULAR; INTRAVENOUS; SUBCUTANEOUS at 22:02

## 2018-09-07 RX ADMIN — SODIUM CHLORIDE 3 MILLILITER(S): 9 INJECTION INTRAMUSCULAR; INTRAVENOUS; SUBCUTANEOUS at 13:11

## 2018-09-07 RX ADMIN — HYDROMORPHONE HYDROCHLORIDE 1 MILLIGRAM(S): 2 INJECTION INTRAMUSCULAR; INTRAVENOUS; SUBCUTANEOUS at 16:40

## 2018-09-07 RX ADMIN — OXYCODONE HYDROCHLORIDE 10 MILLIGRAM(S): 5 TABLET ORAL at 11:05

## 2018-09-07 RX ADMIN — Medication 1000 MILLIGRAM(S): at 20:40

## 2018-09-07 RX ADMIN — Medication 1 TABLET(S): at 11:10

## 2018-09-07 RX ADMIN — OXYCODONE HYDROCHLORIDE 10 MILLIGRAM(S): 5 TABLET ORAL at 12:05

## 2018-09-07 RX ADMIN — HYDROMORPHONE HYDROCHLORIDE 1 MILLIGRAM(S): 2 INJECTION INTRAMUSCULAR; INTRAVENOUS; SUBCUTANEOUS at 10:02

## 2018-09-07 RX ADMIN — Medication 100 MILLIGRAM(S): at 13:09

## 2018-09-07 RX ADMIN — HYDROMORPHONE HYDROCHLORIDE 0.5 MILLIGRAM(S): 2 INJECTION INTRAMUSCULAR; INTRAVENOUS; SUBCUTANEOUS at 13:23

## 2018-09-07 RX ADMIN — HYDROMORPHONE HYDROCHLORIDE 1 MILLIGRAM(S): 2 INJECTION INTRAMUSCULAR; INTRAVENOUS; SUBCUTANEOUS at 05:16

## 2018-09-07 RX ADMIN — HYDROMORPHONE HYDROCHLORIDE 1 MILLIGRAM(S): 2 INJECTION INTRAMUSCULAR; INTRAVENOUS; SUBCUTANEOUS at 16:20

## 2018-09-07 RX ADMIN — ENOXAPARIN SODIUM 40 MILLIGRAM(S): 100 INJECTION SUBCUTANEOUS at 08:47

## 2018-09-07 RX ADMIN — Medication 400 MILLIGRAM(S): at 20:21

## 2018-09-07 RX ADMIN — Medication 975 MILLIGRAM(S): at 06:15

## 2018-09-07 RX ADMIN — Medication 975 MILLIGRAM(S): at 13:09

## 2018-09-07 RX ADMIN — HYDROMORPHONE HYDROCHLORIDE 0.5 MILLIGRAM(S): 2 INJECTION INTRAMUSCULAR; INTRAVENOUS; SUBCUTANEOUS at 13:42

## 2018-09-07 RX ADMIN — OXYCODONE HYDROCHLORIDE 10 MILLIGRAM(S): 5 TABLET ORAL at 02:07

## 2018-09-07 RX ADMIN — Medication 100 MILLIGRAM(S): at 04:48

## 2018-09-07 RX ADMIN — POLYETHYLENE GLYCOL 3350 17 GRAM(S): 17 POWDER, FOR SOLUTION ORAL at 11:06

## 2018-09-07 RX ADMIN — Medication 102 MILLIGRAM(S): at 05:16

## 2018-09-07 NOTE — PROGRESS NOTE ADULT - SUBJECTIVE AND OBJECTIVE BOX
Pt S/E at bedside, no acute events overnight, pain controlled    Vital Signs Last 24 Hrs  T(C): 36.6 (07 Sep 2018 04:15), Max: 36.7 (07 Sep 2018 00:15)  T(F): 97.8 (07 Sep 2018 04:15), Max: 98.1 (07 Sep 2018 00:15)  HR: 82 (07 Sep 2018 04:15) (69 - 89)  BP: 101/54 (07 Sep 2018 04:15) (101/54 - 163/86)  BP(mean): --  RR: 16 (07 Sep 2018 04:15) (14 - 18)  SpO2: 94% (07 Sep 2018 04:15) (94% - 99%)    Gen: NAD, AAOx3    Right Lower Extremity:  Dressing clean dry intact, +HMV  +EHL/FHL/TA/GS  SILT L3-S1  +DP/PT Pulses  Compartments soft  No calf TTP B/L

## 2018-09-07 NOTE — PROGRESS NOTE ADULT - ASSESSMENT
A/P: 66F s/p revision R TKA POD 1  Analgesia  DVT ppx  WBAT  PT/OT  Monitor/record HMV output  FU labs  DC planning - likely [facility] when bed available

## 2018-09-07 NOTE — DISCHARGE NOTE ADULT - CARE PLAN
Principal Discharge DX:	Status post revision of total knee replacement, right  Goal:	Return to baseline ADLs  Assessment and plan of treatment:	1.	Pain Control  2.	Walking with full weight bearing as tolerated, with assistive devices (walker/Cane as Needed)  3.	DVT Prophylaxis with Lovenox 40mg daily for 2 weeks followed by Aspirin 325mg twice daily for 4 weeks  4.	PT as needed  5.	Follow up with Dr. Chavez as Outpatient in 10-14 Days after Discharge from the Hospital or Rehab. Call Office For Appointment. [phone]  6.	Remove Staples Post-Op Day 21, and Remove Dressing Post-Op Day 10, with Daily Dressing Changes as Need.  7.	Ice/Elevate affected area as Needed  8.	Keep Dressing  Clean and dry.

## 2018-09-07 NOTE — OCCUPATIONAL THERAPY INITIAL EVALUATION ADULT - GENERAL OBSERVATIONS, REHAB EVAL
Pt was encountered OOB in chair; NAD, S/P R TKR revision POD 1, RLE WBAT, R knee dressing ace wrapped clean, dry and intact, AXOX4, cooperative, followed commands; pt c/o pain in R knee due to s/p R TKR which impacts pt performance with functional ADL's/transfers and mobility.

## 2018-09-07 NOTE — OCCUPATIONAL THERAPY INITIAL EVALUATION ADULT - TRANSFER SAFETY CONCERNS NOTED: SIT/STAND, REHAB EVAL
decreased balance during turns/decreased sequencing ability/decreased weight-shifting ability/decreased step length/inability to maintain weight-bearing restrictions w/o assist

## 2018-09-07 NOTE — DISCHARGE NOTE ADULT - CARE PROVIDER_API CALL
Kanu Chavez), Orthopaedic Surgery  [facility] [place] [address]  Phone: [phone]  Fax: [phone]

## 2018-09-07 NOTE — OCCUPATIONAL THERAPY INITIAL EVALUATION ADULT - TRANSFER SAFETY CONCERNS NOTED: TOILET, REHAB EVAL
decreased balance during turns/decreased weight-shifting ability/decreased sequencing ability/decreased step length/inability to maintain weight-bearing restrictions w/o assist

## 2018-09-07 NOTE — DISCHARGE NOTE ADULT - MEDICATION SUMMARY - MEDICATIONS TO TAKE
I will START or STAY ON the medications listed below when I get home from the hospital:    oxyCODONE 5 mg oral tablet  -- 1 tab(s) by mouth every 4 hours, As needed, Mild Pain (1 - 3)  -- Indication: For for pain    oxyCODONE 10 mg oral tablet  -- 1 tab(s) by mouth every 4 hours, As needed, Moderate Pain (4 - 6)  -- Indication: For for pain    Ecotrin 325 mg oral delayed release tablet  -- 1 tab(s) by mouth 2 times a day. Start when Lovenox is completed. Take for 30 days. Please do not skip doses.   -- Indication: For for blood clot prevention. Start on day 15, continue for 30 days    enoxaparin  -- 40 milligram(s) subcutaneous once a day for 14 days. Please do not skip doses.   -- Indication: For for blood clot prevention. Once a day for 14 days    traZODone 50 mg oral tablet  -- 1 tab(s) by mouth once a day (at bedtime)  -- Indication: For home med    Wellbutrin 100 mg oral tablet  -- 1 tab(s) by mouth 2 times a day  -- Indication: For home med I will START or STAY ON the medications listed below when I get home from the hospital:    Ecotrin 325 mg oral delayed release tablet  -- 1 tab(s) by mouth 2 times a day. Start when Lovenox is completed. Take for 30 days. Please do not skip doses.   -- Indication: For for blood clot prevention. Start on day 15, continue for 30 days    OxyCONTIN 20 mg oral tablet, extended release  -- 1 tab(s) by mouth every 12 hours  -- Indication: For for pain    Dilaudid 2 mg oral tablet  -- 1 tab(s) by mouth every 4 to 6 hours, As Needed for severe pain.   -- Indication: For for severe pain    Dilaudid 4 mg oral tablet  -- 1 tab(s) by mouth every 4 to 6 hours, As Needed for severe pain.   -- Indication: For for severe pain    enoxaparin  -- 40 milligram(s) subcutaneous once a day for 14 days. Please do not skip doses.   -- Indication: For for blood clot prevention. Once a day for 14 days    traZODone 50 mg oral tablet  -- 1 tab(s) by mouth once a day (at bedtime)  -- Indication: For home med    Wellbutrin 100 mg oral tablet  -- 1 tab(s) by mouth 2 times a day  -- Indication: For home med

## 2018-09-07 NOTE — DISCHARGE NOTE ADULT - HOSPITAL COURSE
The patient is a 66 year old Female status post elective Revision Total Knee Arthroplasty to the Right knee after failing outpatient non-operative conservative management.  Patient presented to [facility] after being medically cleared for an elective surgical procedure. The patient was taken to the operating room on date mentioned above. Prophylactic antibiotics were started before the procedure and continued for 24 hours.  There were no complications during the procedure and patient tolerated the procedure well.  The patient was transferred to recovery room in stable condition and subsequently to surgical floor.  Patient was placed on Lovenox for anticoagulation.  All home medications were continued.  The patient received physical therapy daily and daily labs were followed.  The dressing was kept clean, dry, intact.  The rest of the hospital stay was unremarkable.

## 2018-09-07 NOTE — OCCUPATIONAL THERAPY INITIAL EVALUATION ADULT - SOCIAL CONCERNS
Patients  works full time and would not be able to assist patient when patient returns home./Complex psychosocial needs/coping issues

## 2018-09-07 NOTE — OCCUPATIONAL THERAPY INITIAL EVALUATION ADULT - ADDITIONAL COMMENTS
As per patient and Pre-op, Patient lives in a private house with no steps to enter. Once inside, the patient has 9 steps (+) bilateral handrails to a platform and then another 9 steps (+) bilateral handrails to second floor where bedroom and bathroom is. The patient bathroom has a shower stall with a regular toilet. The patient reports having no DME inside of that bathroom. The patient ambulates with no device and does not own any devices for ambulation. The patient is R handed, wears glasses all the time and drives.

## 2018-09-07 NOTE — DISCHARGE NOTE ADULT - PLAN OF CARE
Return to baseline ADLs 1.	Pain Control  2.	Walking with full weight bearing as tolerated, with assistive devices (walker/Cane as Needed)  3.	DVT Prophylaxis with Lovenox 40mg daily for 2 weeks followed by Aspirin 325mg twice daily for 4 weeks  4.	PT as needed  5.	Follow up with Dr. Chavez as Outpatient in 10-14 Days after Discharge from the Hospital or Rehab. Call Office For Appointment. [phone]  6.	Remove Staples Post-Op Day 21, and Remove Dressing Post-Op Day 10, with Daily Dressing Changes as Need.  7.	Ice/Elevate affected area as Needed  8.	Keep Dressing  Clean and dry.

## 2018-09-07 NOTE — DISCHARGE NOTE ADULT - MEDICATION SUMMARY - MEDICATIONS TO STOP TAKING
I will STOP taking the medications listed below when I get home from the hospital:    CeleBREX 200 mg oral capsule

## 2018-09-07 NOTE — OCCUPATIONAL THERAPY INITIAL EVALUATION ADULT - RANGE OF MOTION EXAMINATION, LOWER EXTREMITY
Left LE Active ROM was WFL (within functional limits)/Left LE Passive ROM was WFL (w/i functional limits)/RLE AROM hip flexion WFL, RLE AROM knee flexion 0-45, RLE AROM distally to knee WFL

## 2018-09-07 NOTE — DISCHARGE NOTE ADULT - PATIENT PORTAL LINK FT
You can access the [facility] Patient Portal, offered by [facility], by registering with the following website: [email or web address] [email or web address]

## 2018-09-08 LAB
ANION GAP SERPL CALC-SCNC: 6 MMOL/L — SIGNIFICANT CHANGE UP (ref 5–17)
BUN SERPL-MCNC: 15 MG/DL — SIGNIFICANT CHANGE UP (ref 7–23)
CALCIUM SERPL-MCNC: 9.3 MG/DL — SIGNIFICANT CHANGE UP (ref 8.5–10.1)
CHLORIDE SERPL-SCNC: 104 MMOL/L — SIGNIFICANT CHANGE UP (ref 96–108)
CO2 SERPL-SCNC: 33 MMOL/L — HIGH (ref 22–31)
CREAT SERPL-MCNC: 0.57 MG/DL — SIGNIFICANT CHANGE UP (ref 0.5–1.3)
GLUCOSE SERPL-MCNC: 114 MG/DL — HIGH (ref 70–99)
HCT VFR BLD CALC: 35 % — SIGNIFICANT CHANGE UP (ref 34.5–45)
HGB BLD-MCNC: 11.4 G/DL — LOW (ref 11.5–15.5)
MCHC RBC-ENTMCNC: 30.9 PG — SIGNIFICANT CHANGE UP (ref 27–34)
MCHC RBC-ENTMCNC: 32.6 GM/DL — SIGNIFICANT CHANGE UP (ref 32–36)
MCV RBC AUTO: 94.9 FL — SIGNIFICANT CHANGE UP (ref 80–100)
NRBC # BLD: 0 /100 WBCS — SIGNIFICANT CHANGE UP (ref 0–0)
PLATELET # BLD AUTO: 287 K/UL — SIGNIFICANT CHANGE UP (ref 150–400)
POTASSIUM SERPL-MCNC: 4.9 MMOL/L — SIGNIFICANT CHANGE UP (ref 3.5–5.3)
POTASSIUM SERPL-SCNC: 4.9 MMOL/L — SIGNIFICANT CHANGE UP (ref 3.5–5.3)
RBC # BLD: 3.69 M/UL — LOW (ref 3.8–5.2)
RBC # FLD: 12.2 % — SIGNIFICANT CHANGE UP (ref 10.3–14.5)
SODIUM SERPL-SCNC: 143 MMOL/L — SIGNIFICANT CHANGE UP (ref 135–145)
WBC # BLD: 14.36 K/UL — HIGH (ref 3.8–10.5)
WBC # FLD AUTO: 14.36 K/UL — HIGH (ref 3.8–10.5)

## 2018-09-08 RX ORDER — HYDROMORPHONE HYDROCHLORIDE 2 MG/ML
2 INJECTION INTRAMUSCULAR; INTRAVENOUS; SUBCUTANEOUS EVERY 4 HOURS
Qty: 0 | Refills: 0 | Status: DISCONTINUED | OUTPATIENT
Start: 2018-09-08 | End: 2018-09-10

## 2018-09-08 RX ORDER — OXYCODONE HYDROCHLORIDE 5 MG/1
10 TABLET ORAL EVERY 4 HOURS
Qty: 0 | Refills: 0 | Status: DISCONTINUED | OUTPATIENT
Start: 2018-09-08 | End: 2018-09-09

## 2018-09-08 RX ORDER — OXYCODONE HYDROCHLORIDE 5 MG/1
20 TABLET ORAL EVERY 12 HOURS
Qty: 0 | Refills: 0 | Status: DISCONTINUED | OUTPATIENT
Start: 2018-09-08 | End: 2018-09-10

## 2018-09-08 RX ORDER — ACETAMINOPHEN 500 MG
1000 TABLET ORAL ONCE
Qty: 0 | Refills: 0 | Status: COMPLETED | OUTPATIENT
Start: 2018-09-08 | End: 2018-09-08

## 2018-09-08 RX ORDER — OXYCODONE HYDROCHLORIDE 5 MG/1
5 TABLET ORAL EVERY 4 HOURS
Qty: 0 | Refills: 0 | Status: DISCONTINUED | OUTPATIENT
Start: 2018-09-08 | End: 2018-09-09

## 2018-09-08 RX ADMIN — HYDROMORPHONE HYDROCHLORIDE 2 MILLIGRAM(S): 2 INJECTION INTRAMUSCULAR; INTRAVENOUS; SUBCUTANEOUS at 22:05

## 2018-09-08 RX ADMIN — OXYCODONE HYDROCHLORIDE 10 MILLIGRAM(S): 5 TABLET ORAL at 23:34

## 2018-09-08 RX ADMIN — HYDROMORPHONE HYDROCHLORIDE 2 MILLIGRAM(S): 2 INJECTION INTRAMUSCULAR; INTRAVENOUS; SUBCUTANEOUS at 14:38

## 2018-09-08 RX ADMIN — POLYETHYLENE GLYCOL 3350 17 GRAM(S): 17 POWDER, FOR SOLUTION ORAL at 11:53

## 2018-09-08 RX ADMIN — SODIUM CHLORIDE 3 MILLILITER(S): 9 INJECTION INTRAMUSCULAR; INTRAVENOUS; SUBCUTANEOUS at 13:23

## 2018-09-08 RX ADMIN — HYDROMORPHONE HYDROCHLORIDE 1 MILLIGRAM(S): 2 INJECTION INTRAMUSCULAR; INTRAVENOUS; SUBCUTANEOUS at 11:15

## 2018-09-08 RX ADMIN — Medication 50 MILLIGRAM(S): at 21:50

## 2018-09-08 RX ADMIN — HYDROMORPHONE HYDROCHLORIDE 2 MILLIGRAM(S): 2 INJECTION INTRAMUSCULAR; INTRAVENOUS; SUBCUTANEOUS at 21:50

## 2018-09-08 RX ADMIN — SODIUM CHLORIDE 3 MILLILITER(S): 9 INJECTION INTRAMUSCULAR; INTRAVENOUS; SUBCUTANEOUS at 06:48

## 2018-09-08 RX ADMIN — Medication 100 MILLIGRAM(S): at 06:44

## 2018-09-08 RX ADMIN — Medication 100 MILLIGRAM(S): at 21:50

## 2018-09-08 RX ADMIN — Medication 975 MILLIGRAM(S): at 07:27

## 2018-09-08 RX ADMIN — OXYCODONE HYDROCHLORIDE 10 MILLIGRAM(S): 5 TABLET ORAL at 11:59

## 2018-09-08 RX ADMIN — Medication 1 TABLET(S): at 14:39

## 2018-09-08 RX ADMIN — Medication 975 MILLIGRAM(S): at 06:44

## 2018-09-08 RX ADMIN — OXYCODONE HYDROCHLORIDE 20 MILLIGRAM(S): 5 TABLET ORAL at 18:50

## 2018-09-08 RX ADMIN — HYDROMORPHONE HYDROCHLORIDE 1 MILLIGRAM(S): 2 INJECTION INTRAMUSCULAR; INTRAVENOUS; SUBCUTANEOUS at 02:30

## 2018-09-08 RX ADMIN — OXYCODONE HYDROCHLORIDE 10 MILLIGRAM(S): 5 TABLET ORAL at 07:27

## 2018-09-08 RX ADMIN — SODIUM CHLORIDE 3 MILLILITER(S): 9 INJECTION INTRAMUSCULAR; INTRAVENOUS; SUBCUTANEOUS at 23:33

## 2018-09-08 RX ADMIN — HYDROMORPHONE HYDROCHLORIDE 1 MILLIGRAM(S): 2 INJECTION INTRAMUSCULAR; INTRAVENOUS; SUBCUTANEOUS at 02:17

## 2018-09-08 RX ADMIN — Medication 1 TABLET(S): at 06:44

## 2018-09-08 RX ADMIN — HYDROMORPHONE HYDROCHLORIDE 1 MILLIGRAM(S): 2 INJECTION INTRAMUSCULAR; INTRAVENOUS; SUBCUTANEOUS at 10:58

## 2018-09-08 RX ADMIN — Medication 400 MILLIGRAM(S): at 19:24

## 2018-09-08 RX ADMIN — OXYCODONE HYDROCHLORIDE 10 MILLIGRAM(S): 5 TABLET ORAL at 12:50

## 2018-09-08 RX ADMIN — Medication 1 TABLET(S): at 11:53

## 2018-09-08 RX ADMIN — OXYCODONE HYDROCHLORIDE 10 MILLIGRAM(S): 5 TABLET ORAL at 06:48

## 2018-09-08 RX ADMIN — OXYCODONE HYDROCHLORIDE 20 MILLIGRAM(S): 5 TABLET ORAL at 19:50

## 2018-09-08 RX ADMIN — HYDROMORPHONE HYDROCHLORIDE 2 MILLIGRAM(S): 2 INJECTION INTRAMUSCULAR; INTRAVENOUS; SUBCUTANEOUS at 14:55

## 2018-09-08 RX ADMIN — Medication 100 MILLIGRAM(S): at 14:39

## 2018-09-08 RX ADMIN — OXYCODONE HYDROCHLORIDE 10 MILLIGRAM(S): 5 TABLET ORAL at 16:45

## 2018-09-08 RX ADMIN — Medication 1 TABLET(S): at 21:50

## 2018-09-08 RX ADMIN — Medication 1000 MILLIGRAM(S): at 19:40

## 2018-09-08 RX ADMIN — Medication 400 MILLIGRAM(S): at 09:35

## 2018-09-08 RX ADMIN — OXYCODONE HYDROCHLORIDE 10 MILLIGRAM(S): 5 TABLET ORAL at 17:45

## 2018-09-08 RX ADMIN — Medication 1000 MILLIGRAM(S): at 10:10

## 2018-09-08 RX ADMIN — ENOXAPARIN SODIUM 40 MILLIGRAM(S): 100 INJECTION SUBCUTANEOUS at 07:35

## 2018-09-08 NOTE — PHARMACY COMMUNICATION NOTE - COMMENTS
verified with alexander 9/8/18 230pm, oxycontin is a standing order, not a prn, he requested I remove special instructions

## 2018-09-08 NOTE — PROGRESS NOTE ADULT - ASSESSMENT
66F s/p revision R TKA POD 2  Analgesia  DVT ppx  WBAT RLE  PT/OT  Incentive spirometry  DC planning to rehab likely tomorrow

## 2018-09-08 NOTE — PROGRESS NOTE ADULT - SUBJECTIVE AND OBJECTIVE BOX
Pt S/E at bedside, no acute events overnight. Pt still having pain in R leg. Had a block performed yesterday by anesthesia. States pain is improving however she was not able to do PT yesterday 2/2 pain.     Vital Signs Last 24 Hrs  T(C): 36.4 (08 Sep 2018 06:50), Max: 36.6 (07 Sep 2018 08:15)  T(F): 97.6 (08 Sep 2018 06:50), Max: 97.9 (07 Sep 2018 08:15)  HR: 63 (08 Sep 2018 06:50) (63 - 76)  BP: 126/63 (08 Sep 2018 06:50) (103/54 - 136/61)  BP(mean): --  RR: 16 (08 Sep 2018 06:50) (16 - 19)  SpO2: 98% (08 Sep 2018 06:50) (93% - 98%)    Gen: NAD, AAOx3    Right Lower Extremity:  Dressing clean dry intact  +EHL/FHL/TA/GS  SILT L3-S1  +DP/PT Pulses  Compartments soft  No calf TTP B/L

## 2018-09-09 LAB
ANION GAP SERPL CALC-SCNC: 7 MMOL/L — SIGNIFICANT CHANGE UP (ref 5–17)
BUN SERPL-MCNC: 12 MG/DL — SIGNIFICANT CHANGE UP (ref 7–23)
CALCIUM SERPL-MCNC: 8.9 MG/DL — SIGNIFICANT CHANGE UP (ref 8.5–10.1)
CHLORIDE SERPL-SCNC: 100 MMOL/L — SIGNIFICANT CHANGE UP (ref 96–108)
CO2 SERPL-SCNC: 34 MMOL/L — HIGH (ref 22–31)
CREAT SERPL-MCNC: 0.61 MG/DL — SIGNIFICANT CHANGE UP (ref 0.5–1.3)
GLUCOSE SERPL-MCNC: 99 MG/DL — SIGNIFICANT CHANGE UP (ref 70–99)
HCT VFR BLD CALC: 34.8 % — SIGNIFICANT CHANGE UP (ref 34.5–45)
HGB BLD-MCNC: 11.2 G/DL — LOW (ref 11.5–15.5)
MCHC RBC-ENTMCNC: 30.9 PG — SIGNIFICANT CHANGE UP (ref 27–34)
MCHC RBC-ENTMCNC: 32.2 GM/DL — SIGNIFICANT CHANGE UP (ref 32–36)
MCV RBC AUTO: 96.1 FL — SIGNIFICANT CHANGE UP (ref 80–100)
NRBC # BLD: 0 /100 WBCS — SIGNIFICANT CHANGE UP (ref 0–0)
PLATELET # BLD AUTO: 284 K/UL — SIGNIFICANT CHANGE UP (ref 150–400)
POTASSIUM SERPL-MCNC: 3.7 MMOL/L — SIGNIFICANT CHANGE UP (ref 3.5–5.3)
POTASSIUM SERPL-SCNC: 3.7 MMOL/L — SIGNIFICANT CHANGE UP (ref 3.5–5.3)
RBC # BLD: 3.62 M/UL — LOW (ref 3.8–5.2)
RBC # FLD: 12.4 % — SIGNIFICANT CHANGE UP (ref 10.3–14.5)
SODIUM SERPL-SCNC: 141 MMOL/L — SIGNIFICANT CHANGE UP (ref 135–145)
WBC # BLD: 11.27 K/UL — HIGH (ref 3.8–10.5)
WBC # FLD AUTO: 11.27 K/UL — HIGH (ref 3.8–10.5)

## 2018-09-09 RX ORDER — SODIUM CHLORIDE 9 MG/ML
500 INJECTION INTRAMUSCULAR; INTRAVENOUS; SUBCUTANEOUS ONCE
Qty: 0 | Refills: 0 | Status: COMPLETED | OUTPATIENT
Start: 2018-09-09 | End: 2018-09-09

## 2018-09-09 RX ORDER — ACETAMINOPHEN 500 MG
1000 TABLET ORAL ONCE
Qty: 0 | Refills: 0 | Status: COMPLETED | OUTPATIENT
Start: 2018-09-09 | End: 2019-08-08

## 2018-09-09 RX ORDER — ACETAMINOPHEN 500 MG
1000 TABLET ORAL ONCE
Qty: 0 | Refills: 0 | Status: COMPLETED | OUTPATIENT
Start: 2018-09-09 | End: 2018-09-09

## 2018-09-09 RX ORDER — KETOROLAC TROMETHAMINE 30 MG/ML
30 SYRINGE (ML) INJECTION ONCE
Qty: 0 | Refills: 0 | Status: DISCONTINUED | OUTPATIENT
Start: 2018-09-09 | End: 2018-09-09

## 2018-09-09 RX ORDER — KETOROLAC TROMETHAMINE 30 MG/ML
15 SYRINGE (ML) INJECTION ONCE
Qty: 0 | Refills: 0 | Status: DISCONTINUED | OUTPATIENT
Start: 2018-09-09 | End: 2018-09-09

## 2018-09-09 RX ORDER — OXYCODONE HYDROCHLORIDE 5 MG/1
15 TABLET ORAL EVERY 4 HOURS
Qty: 0 | Refills: 0 | Status: DISCONTINUED | OUTPATIENT
Start: 2018-09-09 | End: 2018-09-10

## 2018-09-09 RX ORDER — OXYCODONE HYDROCHLORIDE 5 MG/1
10 TABLET ORAL EVERY 4 HOURS
Qty: 0 | Refills: 0 | Status: DISCONTINUED | OUTPATIENT
Start: 2018-09-09 | End: 2018-09-10

## 2018-09-09 RX ORDER — KETOROLAC TROMETHAMINE 30 MG/ML
30 SYRINGE (ML) INJECTION EVERY 6 HOURS
Qty: 0 | Refills: 0 | Status: DISCONTINUED | OUTPATIENT
Start: 2018-09-09 | End: 2018-09-09

## 2018-09-09 RX ADMIN — SODIUM CHLORIDE 1000 MILLILITER(S): 9 INJECTION INTRAMUSCULAR; INTRAVENOUS; SUBCUTANEOUS at 11:38

## 2018-09-09 RX ADMIN — OXYCODONE HYDROCHLORIDE 10 MILLIGRAM(S): 5 TABLET ORAL at 15:58

## 2018-09-09 RX ADMIN — HYDROMORPHONE HYDROCHLORIDE 2 MILLIGRAM(S): 2 INJECTION INTRAMUSCULAR; INTRAVENOUS; SUBCUTANEOUS at 22:35

## 2018-09-09 RX ADMIN — Medication 1 TABLET(S): at 06:29

## 2018-09-09 RX ADMIN — SODIUM CHLORIDE 3 MILLILITER(S): 9 INJECTION INTRAMUSCULAR; INTRAVENOUS; SUBCUTANEOUS at 13:24

## 2018-09-09 RX ADMIN — Medication 100 MILLIGRAM(S): at 06:29

## 2018-09-09 RX ADMIN — Medication 1000 MILLIGRAM(S): at 17:40

## 2018-09-09 RX ADMIN — OXYCODONE HYDROCHLORIDE 5 MILLIGRAM(S): 5 TABLET ORAL at 07:54

## 2018-09-09 RX ADMIN — Medication 10 MILLIGRAM(S): at 14:32

## 2018-09-09 RX ADMIN — Medication 30 MILLIGRAM(S): at 04:55

## 2018-09-09 RX ADMIN — OXYCODONE HYDROCHLORIDE 20 MILLIGRAM(S): 5 TABLET ORAL at 07:15

## 2018-09-09 RX ADMIN — Medication 15 MILLIGRAM(S): at 16:07

## 2018-09-09 RX ADMIN — OXYCODONE HYDROCHLORIDE 20 MILLIGRAM(S): 5 TABLET ORAL at 06:29

## 2018-09-09 RX ADMIN — ENOXAPARIN SODIUM 40 MILLIGRAM(S): 100 INJECTION SUBCUTANEOUS at 07:54

## 2018-09-09 RX ADMIN — Medication 30 MILLIGRAM(S): at 09:29

## 2018-09-09 RX ADMIN — Medication 15 MILLIGRAM(S): at 16:40

## 2018-09-09 RX ADMIN — OXYCODONE HYDROCHLORIDE 5 MILLIGRAM(S): 5 TABLET ORAL at 08:54

## 2018-09-09 RX ADMIN — OXYCODONE HYDROCHLORIDE 10 MILLIGRAM(S): 5 TABLET ORAL at 10:29

## 2018-09-09 RX ADMIN — SODIUM CHLORIDE 3 MILLILITER(S): 9 INJECTION INTRAMUSCULAR; INTRAVENOUS; SUBCUTANEOUS at 22:08

## 2018-09-09 RX ADMIN — HYDROMORPHONE HYDROCHLORIDE 2 MILLIGRAM(S): 2 INJECTION INTRAMUSCULAR; INTRAVENOUS; SUBCUTANEOUS at 22:20

## 2018-09-09 RX ADMIN — Medication 30 MILLIGRAM(S): at 10:00

## 2018-09-09 RX ADMIN — OXYCODONE HYDROCHLORIDE 20 MILLIGRAM(S): 5 TABLET ORAL at 17:39

## 2018-09-09 RX ADMIN — POLYETHYLENE GLYCOL 3350 17 GRAM(S): 17 POWDER, FOR SOLUTION ORAL at 12:25

## 2018-09-09 RX ADMIN — SODIUM CHLORIDE 3 MILLILITER(S): 9 INJECTION INTRAMUSCULAR; INTRAVENOUS; SUBCUTANEOUS at 06:23

## 2018-09-09 RX ADMIN — Medication 1000 MILLIGRAM(S): at 08:55

## 2018-09-09 RX ADMIN — HYDROMORPHONE HYDROCHLORIDE 2 MILLIGRAM(S): 2 INJECTION INTRAMUSCULAR; INTRAVENOUS; SUBCUTANEOUS at 04:21

## 2018-09-09 RX ADMIN — Medication 400 MILLIGRAM(S): at 17:04

## 2018-09-09 RX ADMIN — Medication 100 MILLIGRAM(S): at 22:07

## 2018-09-09 RX ADMIN — Medication 30 MILLIGRAM(S): at 05:10

## 2018-09-09 RX ADMIN — Medication 400 MILLIGRAM(S): at 07:55

## 2018-09-09 RX ADMIN — OXYCODONE HYDROCHLORIDE 20 MILLIGRAM(S): 5 TABLET ORAL at 18:39

## 2018-09-09 RX ADMIN — Medication 1 TABLET(S): at 22:07

## 2018-09-09 RX ADMIN — OXYCODONE HYDROCHLORIDE 10 MILLIGRAM(S): 5 TABLET ORAL at 00:30

## 2018-09-09 RX ADMIN — OXYCODONE HYDROCHLORIDE 10 MILLIGRAM(S): 5 TABLET ORAL at 16:58

## 2018-09-09 RX ADMIN — Medication 50 MILLIGRAM(S): at 22:07

## 2018-09-09 RX ADMIN — HYDROMORPHONE HYDROCHLORIDE 2 MILLIGRAM(S): 2 INJECTION INTRAMUSCULAR; INTRAVENOUS; SUBCUTANEOUS at 04:35

## 2018-09-09 RX ADMIN — OXYCODONE HYDROCHLORIDE 10 MILLIGRAM(S): 5 TABLET ORAL at 09:29

## 2018-09-09 NOTE — PROGRESS NOTE ADULT - SUBJECTIVE AND OBJECTIVE BOX
Pt S/E at bedside, no acute events overnight. Pt still c/o pain in R knee and has been requiring multiple pain medications. Has had difficulty participating with PT 2/2 pain. Pt states pain slightly improved this morning. No other complaints. Denies fevers/chills, CP/SOB, calf pain.    Vital Signs Last 24 Hrs  T(C): 36.9 (09 Sep 2018 06:30), Max: 37.1 (09 Sep 2018 04:17)  T(F): 98.5 (09 Sep 2018 06:30), Max: 98.8 (09 Sep 2018 04:17)  HR: 74 (09 Sep 2018 06:30) (58 - 74)  BP: 112/55 (09 Sep 2018 06:30) (112/55 - 172/80)  BP(mean): --  RR: 16 (09 Sep 2018 06:30) (16 - 18)  SpO2: 98% (09 Sep 2018 06:30) (97% - 98%)    Gen: NAD, AAOx3    Right Lower Extremity:  Dressing clean dry intact  +EHL/FHL/TA/GS  SILT L3-S1  +DP/PT Pulses  Compartments soft  No calf TTP B/L

## 2018-09-10 ENCOUNTER — INPATIENT (INPATIENT)
Facility: HOSPITAL | Age: 68
LOS: 9 days | Discharge: HOME | DRG: 950 | End: 2018-09-20
Attending: PHYSICAL MEDICINE & REHABILITATION | Admitting: PHYSICAL MEDICINE & REHABILITATION
Payer: MEDICARE

## 2018-09-10 VITALS
SYSTOLIC BLOOD PRESSURE: 121 MMHG | OXYGEN SATURATION: 96 % | HEIGHT: 67 IN | WEIGHT: 160.5 LBS | DIASTOLIC BLOOD PRESSURE: 76 MMHG | TEMPERATURE: 98 F | RESPIRATION RATE: 14 BRPM | HEART RATE: 78 BPM

## 2018-09-10 VITALS
RESPIRATION RATE: 17 BRPM | HEART RATE: 78 BPM | OXYGEN SATURATION: 97 % | SYSTOLIC BLOOD PRESSURE: 126 MMHG | DIASTOLIC BLOOD PRESSURE: 61 MMHG

## 2018-09-10 DIAGNOSIS — Z96.659 PRESENCE OF UNSPECIFIED ARTIFICIAL KNEE JOINT: Chronic | ICD-10-CM

## 2018-09-10 DIAGNOSIS — Z98.890 OTHER SPECIFIED POSTPROCEDURAL STATES: Chronic | ICD-10-CM

## 2018-09-10 DIAGNOSIS — Z96.659 PRESENCE OF UNSPECIFIED ARTIFICIAL KNEE JOINT: ICD-10-CM

## 2018-09-10 LAB
ANION GAP SERPL CALC-SCNC: 9 MMOL/L — SIGNIFICANT CHANGE UP (ref 5–17)
BUN SERPL-MCNC: 12 MG/DL — SIGNIFICANT CHANGE UP (ref 7–23)
CALCIUM SERPL-MCNC: 8.7 MG/DL — SIGNIFICANT CHANGE UP (ref 8.5–10.1)
CHLORIDE SERPL-SCNC: 100 MMOL/L — SIGNIFICANT CHANGE UP (ref 96–108)
CO2 SERPL-SCNC: 30 MMOL/L — SIGNIFICANT CHANGE UP (ref 22–31)
CREAT SERPL-MCNC: 0.6 MG/DL — SIGNIFICANT CHANGE UP (ref 0.5–1.3)
GLUCOSE SERPL-MCNC: 109 MG/DL — HIGH (ref 70–99)
HCT VFR BLD CALC: 31.9 % — LOW (ref 34.5–45)
HGB BLD-MCNC: 10.6 G/DL — LOW (ref 11.5–15.5)
MCHC RBC-ENTMCNC: 31.2 PG — SIGNIFICANT CHANGE UP (ref 27–34)
MCHC RBC-ENTMCNC: 33.2 GM/DL — SIGNIFICANT CHANGE UP (ref 32–36)
MCV RBC AUTO: 93.8 FL — SIGNIFICANT CHANGE UP (ref 80–100)
NRBC # BLD: 0 /100 WBCS — SIGNIFICANT CHANGE UP (ref 0–0)
PLATELET # BLD AUTO: 298 K/UL — SIGNIFICANT CHANGE UP (ref 150–400)
POTASSIUM SERPL-MCNC: 4.1 MMOL/L — SIGNIFICANT CHANGE UP (ref 3.5–5.3)
POTASSIUM SERPL-SCNC: 4.1 MMOL/L — SIGNIFICANT CHANGE UP (ref 3.5–5.3)
RBC # BLD: 3.4 M/UL — LOW (ref 3.8–5.2)
RBC # FLD: 12.2 % — SIGNIFICANT CHANGE UP (ref 10.3–14.5)
SODIUM SERPL-SCNC: 139 MMOL/L — SIGNIFICANT CHANGE UP (ref 135–145)
SURGICAL PATHOLOGY STUDY: SIGNIFICANT CHANGE UP
WBC # BLD: 11.82 K/UL — HIGH (ref 3.8–10.5)
WBC # FLD AUTO: 11.82 K/UL — HIGH (ref 3.8–10.5)

## 2018-09-10 PROCEDURE — 99223 1ST HOSP IP/OBS HIGH 75: CPT

## 2018-09-10 RX ORDER — TRAZODONE HCL 50 MG
50 TABLET ORAL AT BEDTIME
Qty: 0 | Refills: 0 | Status: DISCONTINUED | OUTPATIENT
Start: 2018-09-10 | End: 2018-09-20

## 2018-09-10 RX ORDER — DOCUSATE SODIUM 100 MG
100 CAPSULE ORAL THREE TIMES A DAY
Qty: 0 | Refills: 0 | Status: DISCONTINUED | OUTPATIENT
Start: 2018-09-10 | End: 2018-09-20

## 2018-09-10 RX ORDER — SENNA PLUS 8.6 MG/1
2 TABLET ORAL AT BEDTIME
Qty: 0 | Refills: 0 | Status: DISCONTINUED | OUTPATIENT
Start: 2018-09-10 | End: 2018-09-13

## 2018-09-10 RX ORDER — ENOXAPARIN SODIUM 100 MG/ML
40 INJECTION SUBCUTANEOUS DAILY
Qty: 0 | Refills: 0 | Status: DISCONTINUED | OUTPATIENT
Start: 2018-09-10 | End: 2018-09-14

## 2018-09-10 RX ORDER — ACETAMINOPHEN 500 MG
650 TABLET ORAL EVERY 6 HOURS
Qty: 0 | Refills: 0 | Status: DISCONTINUED | OUTPATIENT
Start: 2018-09-10 | End: 2018-09-20

## 2018-09-10 RX ORDER — OXYCODONE HYDROCHLORIDE 5 MG/1
5 TABLET ORAL EVERY 6 HOURS
Qty: 0 | Refills: 0 | Status: DISCONTINUED | OUTPATIENT
Start: 2018-09-10 | End: 2018-09-10

## 2018-09-10 RX ORDER — ZINC SULFATE TAB 220 MG (50 MG ZINC EQUIVALENT) 220 (50 ZN) MG
220 TAB ORAL DAILY
Qty: 0 | Refills: 0 | Status: COMPLETED | OUTPATIENT
Start: 2018-09-10 | End: 2018-09-20

## 2018-09-10 RX ORDER — OXYCODONE HYDROCHLORIDE 5 MG/1
10 TABLET ORAL EVERY 12 HOURS
Qty: 0 | Refills: 0 | Status: DISCONTINUED | OUTPATIENT
Start: 2018-09-10 | End: 2018-09-13

## 2018-09-10 RX ORDER — OXYCODONE HYDROCHLORIDE 5 MG/1
5 TABLET ORAL EVERY 4 HOURS
Qty: 0 | Refills: 0 | Status: DISCONTINUED | OUTPATIENT
Start: 2018-09-10 | End: 2018-09-10

## 2018-09-10 RX ORDER — OXYCODONE HYDROCHLORIDE 5 MG/1
10 TABLET ORAL EVERY 4 HOURS
Qty: 0 | Refills: 0 | Status: DISCONTINUED | OUTPATIENT
Start: 2018-09-10 | End: 2018-09-10

## 2018-09-10 RX ORDER — OXYCODONE HYDROCHLORIDE 5 MG/1
5 TABLET ORAL EVERY 6 HOURS
Qty: 0 | Refills: 0 | Status: DISCONTINUED | OUTPATIENT
Start: 2018-09-10 | End: 2018-09-11

## 2018-09-10 RX ORDER — ASCORBIC ACID 60 MG
500 TABLET,CHEWABLE ORAL
Qty: 0 | Refills: 0 | Status: DISCONTINUED | OUTPATIENT
Start: 2018-09-10 | End: 2018-09-20

## 2018-09-10 RX ORDER — POLYETHYLENE GLYCOL 3350 17 G/17G
17 POWDER, FOR SOLUTION ORAL DAILY
Qty: 0 | Refills: 0 | Status: DISCONTINUED | OUTPATIENT
Start: 2018-09-10 | End: 2018-09-20

## 2018-09-10 RX ORDER — OXYCODONE HYDROCHLORIDE 5 MG/1
10 TABLET ORAL EVERY 6 HOURS
Qty: 0 | Refills: 0 | Status: DISCONTINUED | OUTPATIENT
Start: 2018-09-10 | End: 2018-09-11

## 2018-09-10 RX ORDER — OXYCODONE HYDROCHLORIDE 5 MG/1
20 TABLET ORAL EVERY 12 HOURS
Qty: 0 | Refills: 0 | Status: DISCONTINUED | OUTPATIENT
Start: 2018-09-10 | End: 2018-09-10

## 2018-09-10 RX ADMIN — OXYCODONE HYDROCHLORIDE 10 MILLIGRAM(S): 5 TABLET ORAL at 20:52

## 2018-09-10 RX ADMIN — Medication 1 TABLET(S): at 06:22

## 2018-09-10 RX ADMIN — ENOXAPARIN SODIUM 40 MILLIGRAM(S): 100 INJECTION SUBCUTANEOUS at 08:04

## 2018-09-10 RX ADMIN — Medication 100 MILLIGRAM(S): at 06:22

## 2018-09-10 RX ADMIN — POLYETHYLENE GLYCOL 3350 17 GRAM(S): 17 POWDER, FOR SOLUTION ORAL at 12:02

## 2018-09-10 RX ADMIN — Medication 1 TABLET(S): at 12:01

## 2018-09-10 RX ADMIN — OXYCODONE HYDROCHLORIDE 10 MILLIGRAM(S): 5 TABLET ORAL at 14:38

## 2018-09-10 RX ADMIN — Medication 500 MILLIGRAM(S): at 18:33

## 2018-09-10 RX ADMIN — HYDROMORPHONE HYDROCHLORIDE 2 MILLIGRAM(S): 2 INJECTION INTRAMUSCULAR; INTRAVENOUS; SUBCUTANEOUS at 08:48

## 2018-09-10 RX ADMIN — OXYCODONE HYDROCHLORIDE 10 MILLIGRAM(S): 5 TABLET ORAL at 05:26

## 2018-09-10 RX ADMIN — HYDROMORPHONE HYDROCHLORIDE 2 MILLIGRAM(S): 2 INJECTION INTRAMUSCULAR; INTRAVENOUS; SUBCUTANEOUS at 09:05

## 2018-09-10 RX ADMIN — Medication 100 MILLIGRAM(S): at 21:00

## 2018-09-10 RX ADMIN — OXYCODONE HYDROCHLORIDE 20 MILLIGRAM(S): 5 TABLET ORAL at 07:22

## 2018-09-10 RX ADMIN — OXYCODONE HYDROCHLORIDE 10 MILLIGRAM(S): 5 TABLET ORAL at 04:30

## 2018-09-10 RX ADMIN — OXYCODONE HYDROCHLORIDE 20 MILLIGRAM(S): 5 TABLET ORAL at 06:22

## 2018-09-10 RX ADMIN — Medication 1 TABLET(S): at 21:00

## 2018-09-10 RX ADMIN — OXYCODONE HYDROCHLORIDE 10 MILLIGRAM(S): 5 TABLET ORAL at 12:14

## 2018-09-10 RX ADMIN — SODIUM CHLORIDE 3 MILLILITER(S): 9 INJECTION INTRAMUSCULAR; INTRAVENOUS; SUBCUTANEOUS at 05:24

## 2018-09-10 RX ADMIN — OXYCODONE HYDROCHLORIDE 10 MILLIGRAM(S): 5 TABLET ORAL at 12:40

## 2018-09-10 RX ADMIN — INFLUENZA VIRUS VACCINE 0.5 MILLILITER(S): 15; 15; 15; 15 SUSPENSION INTRAMUSCULAR at 12:02

## 2018-09-10 RX ADMIN — OXYCODONE HYDROCHLORIDE 10 MILLIGRAM(S): 5 TABLET ORAL at 21:30

## 2018-09-10 RX ADMIN — Medication 50 MILLIGRAM(S): at 21:00

## 2018-09-10 RX ADMIN — OXYCODONE HYDROCHLORIDE 10 MILLIGRAM(S): 5 TABLET ORAL at 18:33

## 2018-09-10 NOTE — H&P ADULT - NSHPLABSRESULTS_GEN_ALL_CORE
LABS:                        10.6   11.82 )-----------( 298      ( 10 Sep 2018 06:24 )             31.9     10 Sep 2018 06:24    139    |  100    |  12     ----------------------------<  109    4.1     |  30     |  0.60     Ca    8.7        10 Sep 2018 06:24

## 2018-09-10 NOTE — H&P ADULT - NSHPPHYSICALEXAM_GEN_ALL_CORE
PHYSICAL EXAM  Constitutional - NAD, Comfortable  HEENT - NCAT, EOMI  Neck - Supple  Chest - CTA bilaterally  Cardiovascular - RRR, S1S2  Abdomen - BS+, Soft, NTND  Extremities - No C/C/E, No calf tenderness  Neurologic Exam -  Cognitive - Awake, Alert, AAO to self, place, month. Not oriented to year or date. Difficulty following some complex commands  Attention/Concentration - Impaired, appears to pause mid-sentence, unable to recall specific details about her past medical history  Memory - Deferred   Cranial Nerves - CN 2-12 grossly intact    Motor -  LEFT UE - ShAB 5/5, EF 5/5, EE 5/5, WE 5/5,  5/5  RIGHT UE - ShAB 5/5, EF 5/5, EE 5/5, WE 5/5,  5/5  LEFT LE - HF 4+/5, KE 5/5, DF 5/5, PF 5/5  RIGHT LE - HF and KE limited. DF 5/5, PF 5/5  Sensory - Intact to light touch  Reflexes - Deferred   Psychiatric - Frustrated at times during encounter   Skin - Right knee incision with Acquacel dressing c/d/i  Wounds - None present other than above PHYSICAL EXAM  Constitutional - NAD, Comfortable  HEENT - NCAT, EOMI  Neck - Supple  Chest - CTA bilaterally  Cardiovascular - RRR, S1S2  Abdomen - BS+, Soft, NTND  Extremities - No C/C/E, No calf tenderness. Knee incision covered by surgical dressing. No evidence infection. Limited ROM with pain.   Neurologic Exam -  Cognitive - Awake, Alert, AAO to self, place, month. Not oriented to year or date. Difficulty following some complex commands  Attention/Concentration - Impaired, appears to pause mid-sentence, unable to recall specific details about her past medical history  Memory - Deferred   Cranial Nerves - CN 2-12 grossly intact    Motor -  LEFT UE - ShAB 5/5, EF 5/5, EE 5/5, WE 5/5,  5/5  RIGHT UE - ShAB 5/5, EF 5/5, EE 5/5, WE 5/5,  5/5  LEFT LE - HF 4+/5, KE 5/5, DF 5/5, PF 5/5  RIGHT LE - HF and KE limited. DF 5/5, PF 5/5  Sensory - Intact to light touch  Reflexes - Deferred   Psychiatric - Frustrated at times during encounter   Skin - Right knee incision with Acquacel dressing c/d/i  Wounds - None present other than above

## 2018-09-10 NOTE — PROGRESS NOTE ADULT - ASSESSMENT
A/P: 67 y F sp R TKA POD 4    Analgesia  DVT ppx  WBAT RLE  FU labs  PT/OT  DC planning, possible [facility] rehab today when bed is available.

## 2018-09-10 NOTE — PROGRESS NOTE ADULT - SUBJECTIVE AND OBJECTIVE BOX
Patient seen and examined at bedside. Pain is well controlled. No acute overnight events. No other complaints at this time.        PE:    Vital Signs Last 24 Hrs  T(C): 37.4 (10 Sep 2018 05:01), Max: 37.8 (09 Sep 2018 22:15)  T(F): 99.4 (10 Sep 2018 05:01), Max: 100 (09 Sep 2018 22:15)  HR: 78 (10 Sep 2018 05:01) (61 - 97)  BP: 145/75 (10 Sep 2018 05:01) (91/67 - 149/72)  RR: 17 (10 Sep 2018 05:01) (15 - 19)  SpO2: 96% (10 Sep 2018 05:01) (91% - 100%)    GEN: NAD, resting comfortably      RLE:    aquacel dressing / ace wrap c/d/i  SILT L3-S1  EHL/FHL/GSC/TA intact  DP pulse 2+  Compartments soft and compressible  No calf tenderness

## 2018-09-10 NOTE — H&P ADULT - HISTORY OF PRESENT ILLNESS
Ms. Jones is a 66 year old F s/p right knee arthroplasty in 2014. Patient received poor post operative therapy, multiple surgeries, several falls. 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 Admitted to [facility] for elective revision of her right knee arthroplasty on 9/6/18 by Dr. Chavez. No immediate postoperative complications. Underwent nerve block by anesthesia on 9/7 for severe pain. On 9/7/18 noted to have leukocytosis (WBC 18). Cultures from right knee/synovial fluid negative. Urine culture negative. Leukocytosis improved to 11. Patient also with low grade temperature (100F). Otherwise, medically optimized for discharge to acute rehab.     Weight bearing: As tolerated, RLE.  DVT ppx: Lovenox 40mg daily for 2 weeks followed by Aspirin 325mg twice daily for 4 weeks  Remove Staples Post-Op Day 21; and Remove Dressing Post-Op Day 10 Ms. Jones is a 67 year old F with PMH depression/anxiety, s/p right knee arthroplasty in 2014 (initially had a fall in a school parking lot in 2005 resulting in a "broken ankle" and multiple right knee surgeries including ACL repair, meniscal repair). Per documentation she had poor post operative therapy after her TKA in 2014. She then required arthroscopic surgery in 2015. She has had several subsequent falls, the last being in May/June 2018. Workup showed right knee fibrosis, thus she was admitted to [facility] for elective revision of her right knee arthroplasty on 9/6/18 by Dr. Chavez. No immediate postoperative complications. Underwent nerve block by anesthesia on 9/7 for severe pain. On 9/7/18 noted to have leukocytosis (WBC 18). Cultures from right knee/synovial fluid as well as urine culture all negative. WBC improved to 11 by day of discharge on 9/10/18. Patient also with low grade temperature (99F -100F) last night and this AM. Otherwise, medically optimized for discharge to acute rehab.     Weight bearing: WBAT RLE  DVT ppx: Lovenox 40mg daily for 2 weeks followed by Aspirin 325mg twice daily for 4 weeks  Remove Staples Post-Op Day 21; and Remove Dressing Post-Op Day 10

## 2018-09-10 NOTE — H&P ADULT - NSHPSOCIALHISTORY_GEN_ALL_CORE
SOCIAL HISTORY  Smoking - Denied  EtOH - Denied   Drugs - Denied    FUNCTIONAL HISTORY  Lives in a  with ; one step, no HR to enter; 2 flights with bilateral HRs at home.   Prior Level of Function:               Independent prior with Amubulation and ADLs.     CURRENT FUNCTIONAL STATUS  - Bed Mobility:   - Transfers: Independent (set up required)  - Gait: 150' RW, independent. Steps: 1 step with axiallary crutches, CGA  - ADLs: SOCIAL HISTORY  Smoking - Denied  EtOH - 1-2 Vodka daily (hasn't had a drink in 3 weeks)  Drugs - Denied    FUNCTIONAL HISTORY  Lives in a PH with ; one step, no HR to enter; 6 steps + 7 steps with bilateral HRs inside to get to bedroom   Prior Level of Function: Independent prior with Ambulation (occasionally uses cane) and ADLs.     CURRENT FUNCTIONAL STATUS  - Transfers: Independent (set up required)  - Gait: 150' RW, independent.   - Steps: 1 step with axillary crutches, CGA

## 2018-09-10 NOTE — H&P ADULT - ASSESSMENT
66 year old F s/p right knee arthroplasty in 2014, with fibrosis of her knee replacement, s/p revision on 9/6, with functional, gait, ADL impairments.    #S/p Right TKR revision  -Rehab: Begin comprehensive PT/OT for 3 hours daily. Rehab goals: Stair negotiation, ambulation at Mod I level within 7-10 days.   -Weight bearing: WBAT RLE  -Pain control: Tylenol prn, ice prn, oxycodone prn, Oxycontin 20 mg BID  -DVT ppx: Lovenox 40mg daily for 2 weeks followed by Aspirin 325mg twice daily for 4 weeks  -Remove Staples Post-Op Day 21; Remove Dressing Post-Op Day 10    #Psych, history of anxiety, depression  -On trazodone    #  -Voiding spontaneously     #GI  -Bowel regimen: Senna/colace  -Diet: Regular   -Nutrition: Vit C, MVI, zinc     Precautions:  Fall                                                                               Medical Prognosis: Good     Prescreen Comparison: I have reviewed the prescreen information and I found no relevant changes between the preadmission  screening and my post admission evaluation.     Expected Therapy:   P.T.  1.5      hrs/day     1.5      O. T.      hrs/day           S.L.P.        hrs/day                    P&O                                                   Excpected Frequency: 5 days/7 day period    Rehab Potential:       Good                                    Estimated Disposition:    Home with HC                     ELOS:    7-10          days      Rationale For Inpatient Rehab Admission- Patient demonstrates the following:     [X] Medically appropriate for rehabilitation admission   [X] Has attainable rehab goals with an approrpriate discharge plan  [X] Has rehabilitation potential (expected to make significant improvement within a reasonable period of time)  [X] Requires close medical management by a rehab physician, rehab nursing care and comprehensive interdisciplinary team (including         PT, OT, SLP and/or prosthetics and orthotics) 67 year old F s/p right knee arthroplasty in 2014, with fibrosis of her knee replacement, s/p revision on 9/6, with functional, gait, ADL impairments.    #S/p Right TKR revision  -Rehab: Begin comprehensive PT/OT for 3 hours daily. Rehab goals: Stair negotiation, ambulation at Mod I level within 7-10 days.   -Weight bearing: WBAT RLE  -Pain control: Tylenol prn, ice prn, oxycodone prn, oxycontin BID  -DVT ppx: Lovenox 40mg daily for 2 weeks followed by Aspirin 325mg twice daily for 4 weeks  -Remove Staples Post-Op Day 21; Remove Dressing Post-Op Day 10    #Neuro  -Patient with attention/concentration, working memory deficits, may be secondary to narcotic use, will monitor     #Psych, history of anxiety, depression  -On trazodone  -Takes Wellbutrin at home as well     #  -Voiding spontaneously     #GI  -Bowel regimen: Senna/colace  -Diet: Regular   -Nutrition: Vit C, MVI, zinc     Precautions:  Fall                                                                               Medical Prognosis: Good     Prescreen Comparison: I have reviewed the prescreen information and I found no relevant changes between the preadmission  screening and my post admission evaluation.     Expected Therapy:   P.T.  1.5      hrs/day     1.5      O. T.      hrs/day           S.L.P.        hrs/day                    P&O                                                   Excpected Frequency: 5 days/7 day period    Rehab Potential:       Good                                    Estimated Disposition:    Home with HC                     ELOS:    7-10          days      Rationale For Inpatient Rehab Admission- Patient demonstrates the following:     [X] Medically appropriate for rehabilitation admission   [X] Has attainable rehab goals with an approrpriate discharge plan  [X] Has rehabilitation potential (expected to make significant improvement within a reasonable period of time)  [X] Requires close medical management by a rehab physician, rehab nursing care and comprehensive interdisciplinary team (including         PT, OT, SLP and/or prosthetics and orthotics) 67 year old F s/p right knee arthroplasty in 2014, with fibrosis of her knee replacement, s/p revision on 9/6, with functional, gait, ADL impairments.    #S/p Right TKR revision  -Rehab: Begin comprehensive PT/OT for 3 hours daily. Rehab goals: Stair negotiation, ambulation at Mod I level within 7-10 days.   -Weight bearing: WBAT RLE  -Pain control: Tylenol prn, ice prn, oxycodone prn, oxycontin BID  -DVT ppx: Lovenox 40mg daily for 2 weeks followed by Aspirin 325mg twice daily for 4 weeks  -Remove Staples Post-Op Day 21; Remove Dressing Post-Op Day 10    #Neuro  -Patient with attention/concentration, working memory deficits, may be secondary to narcotic use, will monitor     #Psych, history of anxiety, depression  -On trazodone  -Takes Wellbutrin at home as well     #  -Voiding spontaneously     #GI  -Bowel regimen: Senna/colace  -Diet: Regular   -Nutrition: Vit C, MVI, zinc     Precautions:  Fall                                                                               Medical Prognosis: Good     Prescreen Comparison: I have reviewed the prescreen information and I found no relevant changes between the preadmission  screening and my post admission evaluation.     Expected Therapy:   P.T.  1.5      hrs/day     1.5      O. T.      hrs/day           S.L.P.        hrs/day                    P&O                                                   Excpected Frequency: 5 days/7 day period    Rehab Potential:       Good                                    Estimated Disposition:    Home with HC                     ELOS:    7-10          days      Rationale For Inpatient Rehab Admission- Patient demonstrates the following:     [X] Medically appropriate for rehabilitation admission   [X] Has attainable rehab goals with an approrpriate discharge plan  [X] Has rehabilitation potential (expected to make significant improvement within a reasonable period of time)  [X] Requires close medical management by a rehab physician, rehab nursing care and comprehensive interdisciplinary team (including         PT, OT, SLP and/or prosthetics and orthotics)    Discussed with PMR attending Dr. Charles

## 2018-09-11 ENCOUNTER — INBOUND DOCUMENT (OUTPATIENT)
Age: 68
End: 2018-09-11

## 2018-09-11 DIAGNOSIS — Z96.651 PRESENCE OF RIGHT ARTIFICIAL KNEE JOINT: ICD-10-CM

## 2018-09-11 LAB
ALBUMIN SERPL ELPH-MCNC: 2.7 G/DL — LOW (ref 3.3–5)
ALP SERPL-CCNC: 124 U/L — HIGH (ref 40–120)
ALT FLD-CCNC: 61 U/L DA — HIGH (ref 10–45)
ANION GAP SERPL CALC-SCNC: 7 MMOL/L — SIGNIFICANT CHANGE UP (ref 5–17)
AST SERPL-CCNC: 25 U/L — SIGNIFICANT CHANGE UP (ref 10–40)
BASOPHILS # BLD AUTO: 0.1 K/UL — SIGNIFICANT CHANGE UP (ref 0–0.2)
BASOPHILS NFR BLD AUTO: 0.9 % — SIGNIFICANT CHANGE UP (ref 0–2)
BILIRUB SERPL-MCNC: 0.9 MG/DL — SIGNIFICANT CHANGE UP (ref 0.2–1.2)
BUN SERPL-MCNC: 14 MG/DL — SIGNIFICANT CHANGE UP (ref 7–23)
CALCIUM SERPL-MCNC: 9.3 MG/DL — SIGNIFICANT CHANGE UP (ref 8.4–10.5)
CHLORIDE SERPL-SCNC: 103 MMOL/L — SIGNIFICANT CHANGE UP (ref 96–108)
CO2 SERPL-SCNC: 29 MMOL/L — SIGNIFICANT CHANGE UP (ref 22–31)
CREAT SERPL-MCNC: 0.75 MG/DL — SIGNIFICANT CHANGE UP (ref 0.5–1.3)
CULTURE RESULTS: SIGNIFICANT CHANGE UP
EOSINOPHIL # BLD AUTO: 0.2 K/UL — SIGNIFICANT CHANGE UP (ref 0–0.5)
EOSINOPHIL NFR BLD AUTO: 1.7 % — SIGNIFICANT CHANGE UP (ref 0–6)
GLUCOSE SERPL-MCNC: 115 MG/DL — HIGH (ref 70–99)
HCT VFR BLD CALC: 32.2 % — LOW (ref 34.5–45)
HGB BLD-MCNC: 11.2 G/DL — LOW (ref 11.5–15.5)
LYMPHOCYTES # BLD AUTO: 2.2 K/UL — SIGNIFICANT CHANGE UP (ref 1–3.3)
LYMPHOCYTES # BLD AUTO: 21 % — SIGNIFICANT CHANGE UP (ref 13–44)
MCHC RBC-ENTMCNC: 32.6 PG — SIGNIFICANT CHANGE UP (ref 27–34)
MCHC RBC-ENTMCNC: 34.9 GM/DL — SIGNIFICANT CHANGE UP (ref 32–36)
MCV RBC AUTO: 93.2 FL — SIGNIFICANT CHANGE UP (ref 80–100)
MONOCYTES # BLD AUTO: 0.7 K/UL — SIGNIFICANT CHANGE UP (ref 0–0.9)
MONOCYTES NFR BLD AUTO: 6.2 % — SIGNIFICANT CHANGE UP (ref 1–9)
NEUTROPHILS # BLD AUTO: 7.5 K/UL — HIGH (ref 1.8–7.4)
NEUTROPHILS NFR BLD AUTO: 70.2 % — SIGNIFICANT CHANGE UP (ref 43–77)
PLATELET # BLD AUTO: 277 K/UL — SIGNIFICANT CHANGE UP (ref 150–400)
POTASSIUM SERPL-MCNC: 4 MMOL/L — SIGNIFICANT CHANGE UP (ref 3.5–5.3)
POTASSIUM SERPL-SCNC: 4 MMOL/L — SIGNIFICANT CHANGE UP (ref 3.5–5.3)
PROT SERPL-MCNC: 6.2 G/DL — SIGNIFICANT CHANGE UP (ref 6–8.3)
RBC # BLD: 3.45 M/UL — LOW (ref 3.8–5.2)
RBC # FLD: 11 % — SIGNIFICANT CHANGE UP (ref 10.3–14.5)
SODIUM SERPL-SCNC: 139 MMOL/L — SIGNIFICANT CHANGE UP (ref 135–145)
SPECIMEN SOURCE: SIGNIFICANT CHANGE UP
WBC # BLD: 10.7 K/UL — HIGH (ref 3.8–10.5)
WBC # FLD AUTO: 10.7 K/UL — HIGH (ref 3.8–10.5)

## 2018-09-11 PROCEDURE — 99223 1ST HOSP IP/OBS HIGH 75: CPT

## 2018-09-11 PROCEDURE — 99232 SBSQ HOSP IP/OBS MODERATE 35: CPT

## 2018-09-11 RX ORDER — HYDROMORPHONE HYDROCHLORIDE 2 MG/ML
4 INJECTION INTRAMUSCULAR; INTRAVENOUS; SUBCUTANEOUS EVERY 6 HOURS
Qty: 0 | Refills: 0 | Status: DISCONTINUED | OUTPATIENT
Start: 2018-09-11 | End: 2018-09-13

## 2018-09-11 RX ORDER — OXYCODONE HYDROCHLORIDE 5 MG/1
10 TABLET ORAL EVERY 4 HOURS
Qty: 0 | Refills: 0 | Status: DISCONTINUED | OUTPATIENT
Start: 2018-09-11 | End: 2018-09-11

## 2018-09-11 RX ORDER — GABAPENTIN 400 MG/1
100 CAPSULE ORAL AT BEDTIME
Qty: 0 | Refills: 0 | Status: DISCONTINUED | OUTPATIENT
Start: 2018-09-11 | End: 2018-09-19

## 2018-09-11 RX ORDER — OXYCODONE HYDROCHLORIDE 5 MG/1
5 TABLET ORAL EVERY 4 HOURS
Qty: 0 | Refills: 0 | Status: DISCONTINUED | OUTPATIENT
Start: 2018-09-11 | End: 2018-09-11

## 2018-09-11 RX ORDER — CELECOXIB 200 MG/1
100 CAPSULE ORAL
Qty: 0 | Refills: 0 | Status: DISCONTINUED | OUTPATIENT
Start: 2018-09-11 | End: 2018-09-13

## 2018-09-11 RX ORDER — HYDROMORPHONE HYDROCHLORIDE 2 MG/ML
2 INJECTION INTRAMUSCULAR; INTRAVENOUS; SUBCUTANEOUS EVERY 6 HOURS
Qty: 0 | Refills: 0 | Status: DISCONTINUED | OUTPATIENT
Start: 2018-09-11 | End: 2018-09-11

## 2018-09-11 RX ORDER — HYDROMORPHONE HYDROCHLORIDE 2 MG/ML
2 INJECTION INTRAMUSCULAR; INTRAVENOUS; SUBCUTANEOUS EVERY 6 HOURS
Qty: 0 | Refills: 0 | Status: DISCONTINUED | OUTPATIENT
Start: 2018-09-11 | End: 2018-09-17

## 2018-09-11 RX ORDER — LIDOCAINE 4 G/100G
1 CREAM TOPICAL DAILY
Qty: 0 | Refills: 0 | Status: DISCONTINUED | OUTPATIENT
Start: 2018-09-11 | End: 2018-09-11

## 2018-09-11 RX ORDER — BUPROPION HYDROCHLORIDE 150 MG/1
100 TABLET, EXTENDED RELEASE ORAL
Qty: 0 | Refills: 0 | Status: DISCONTINUED | OUTPATIENT
Start: 2018-09-11 | End: 2018-09-13

## 2018-09-11 RX ORDER — LIDOCAINE 4 G/100G
2 CREAM TOPICAL DAILY
Qty: 0 | Refills: 0 | Status: DISCONTINUED | OUTPATIENT
Start: 2018-09-11 | End: 2018-09-20

## 2018-09-11 RX ORDER — HYDROMORPHONE HYDROCHLORIDE 2 MG/ML
4 INJECTION INTRAMUSCULAR; INTRAVENOUS; SUBCUTANEOUS EVERY 6 HOURS
Qty: 0 | Refills: 0 | Status: DISCONTINUED | OUTPATIENT
Start: 2018-09-11 | End: 2018-09-11

## 2018-09-11 RX ADMIN — Medication 100 MILLIGRAM(S): at 15:15

## 2018-09-11 RX ADMIN — LIDOCAINE 1 PATCH: 4 CREAM TOPICAL at 23:00

## 2018-09-11 RX ADMIN — HYDROMORPHONE HYDROCHLORIDE 4 MILLIGRAM(S): 2 INJECTION INTRAMUSCULAR; INTRAVENOUS; SUBCUTANEOUS at 10:20

## 2018-09-11 RX ADMIN — Medication 100 MILLIGRAM(S): at 06:22

## 2018-09-11 RX ADMIN — Medication 500 MILLIGRAM(S): at 16:05

## 2018-09-11 RX ADMIN — Medication 1 TABLET(S): at 11:25

## 2018-09-11 RX ADMIN — BUPROPION HYDROCHLORIDE 100 MILLIGRAM(S): 150 TABLET, EXTENDED RELEASE ORAL at 16:05

## 2018-09-11 RX ADMIN — HYDROMORPHONE HYDROCHLORIDE 4 MILLIGRAM(S): 2 INJECTION INTRAMUSCULAR; INTRAVENOUS; SUBCUTANEOUS at 10:04

## 2018-09-11 RX ADMIN — Medication 50 MILLIGRAM(S): at 21:37

## 2018-09-11 RX ADMIN — ZINC SULFATE TAB 220 MG (50 MG ZINC EQUIVALENT) 220 MILLIGRAM(S): 220 (50 ZN) TAB at 11:26

## 2018-09-11 RX ADMIN — Medication 500 MILLIGRAM(S): at 06:22

## 2018-09-11 RX ADMIN — Medication 100 MILLIGRAM(S): at 21:37

## 2018-09-11 RX ADMIN — LIDOCAINE 1 PATCH: 4 CREAM TOPICAL at 11:24

## 2018-09-11 RX ADMIN — LIDOCAINE 2 PATCH: 4 CREAM TOPICAL at 15:15

## 2018-09-11 RX ADMIN — HYDROMORPHONE HYDROCHLORIDE 4 MILLIGRAM(S): 2 INJECTION INTRAMUSCULAR; INTRAVENOUS; SUBCUTANEOUS at 22:10

## 2018-09-11 RX ADMIN — Medication 1 TABLET(S): at 15:15

## 2018-09-11 RX ADMIN — HYDROMORPHONE HYDROCHLORIDE 4 MILLIGRAM(S): 2 INJECTION INTRAMUSCULAR; INTRAVENOUS; SUBCUTANEOUS at 21:38

## 2018-09-11 RX ADMIN — OXYCODONE HYDROCHLORIDE 10 MILLIGRAM(S): 5 TABLET ORAL at 03:00

## 2018-09-11 RX ADMIN — CELECOXIB 100 MILLIGRAM(S): 200 CAPSULE ORAL at 17:04

## 2018-09-11 RX ADMIN — OXYCODONE HYDROCHLORIDE 10 MILLIGRAM(S): 5 TABLET ORAL at 07:00

## 2018-09-11 RX ADMIN — OXYCODONE HYDROCHLORIDE 10 MILLIGRAM(S): 5 TABLET ORAL at 17:15

## 2018-09-11 RX ADMIN — Medication 1 TABLET(S): at 06:22

## 2018-09-11 RX ADMIN — OXYCODONE HYDROCHLORIDE 10 MILLIGRAM(S): 5 TABLET ORAL at 07:26

## 2018-09-11 RX ADMIN — GABAPENTIN 100 MILLIGRAM(S): 400 CAPSULE ORAL at 21:38

## 2018-09-11 RX ADMIN — Medication 1 TABLET(S): at 21:37

## 2018-09-11 RX ADMIN — OXYCODONE HYDROCHLORIDE 10 MILLIGRAM(S): 5 TABLET ORAL at 06:22

## 2018-09-11 RX ADMIN — OXYCODONE HYDROCHLORIDE 10 MILLIGRAM(S): 5 TABLET ORAL at 03:30

## 2018-09-11 RX ADMIN — CELECOXIB 100 MILLIGRAM(S): 200 CAPSULE ORAL at 16:05

## 2018-09-11 RX ADMIN — ENOXAPARIN SODIUM 40 MILLIGRAM(S): 100 INJECTION SUBCUTANEOUS at 11:25

## 2018-09-11 RX ADMIN — OXYCODONE HYDROCHLORIDE 10 MILLIGRAM(S): 5 TABLET ORAL at 18:04

## 2018-09-11 NOTE — CONSULT NOTE ADULT - ASSESSMENT
68 yo f pw s/p  right knee arthroplasty in 2014 w fibrosis of her knee replacement s/p revision on 9/6 w functional, gait and adl impairements    1. s/p right TKR revision  cw pain management consider increasing oxy patient is still in significant pain   cw pt/ot  cw bowel care regimen   2. anxiety/depression cw trazodone stable  3. dvt ppx: lovenox

## 2018-09-11 NOTE — PROGRESS NOTE ADULT - SUBJECTIVE AND OBJECTIVE BOX
Ms. Jones is a 67 year old F with PMH depression/anxiety, s/p right knee arthroplasty in 2014 (initially had a fall in a school parking lot in 2005 resulting in a "broken ankle" and multiple right knee surgeries including ACL repair, meniscal repair). Per documentation she had poor post operative therapy after her TKA in 2014. She then required arthroscopic surgery in 2015. She has had several subsequent falls, the last being in May/June 2018. 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 Chavez. No immediate postoperative complications. Underwent nerve block by anesthesia on 9/7 for severe pain. On 9/7/18 noted to have leukocytosis (WBC 18). Cultures from right knee/synovial fluid as well as urine culture all negative. WBC improved to 11 by day of discharge on 9/10/18. Patient also with low grade temperature (99F -100F) last night and this AM. Otherwise, medically optimized for discharge to acute rehab.     Weight bearing: WBAT RLE  DVT ppx: Lovenox 40mg daily for 2 weeks followed by Aspirin 325mg twice daily for 4 weeks  Remove Staples Post-Op Day 21; and Remove Dressing Post-Op Day 10    SUBJECTIVE:    ROS:    Vital Signs Last 24 Hrs  T(C): 37.1 (11 Sep 2018 07:52), Max: 37.1 (10 Sep 2018 20:51)  T(F): 98.7 (11 Sep 2018 07:52), Max: 98.8 (10 Sep 2018 20:51)  HR: 77 (11 Sep 2018 07:52) (77 - 90)  BP: 116/71 (11 Sep 2018 07:52) (113/71 - 121/76)  BP(mean): --  RR: 14 (11 Sep 2018 07:52) (14 - 14)  SpO2: 95% (11 Sep 2018 07:52) (95% - 96%)    PHYSICAL EXAM  	Constitutional - NAD, Comfortable  	HEENT - NCAT, EOMI  	Neck - Supple  	Chest - CTA bilaterally  	Cardiovascular - RRR, S1S2  	Abdomen - BS+, Soft, NTND  	Extremities - No C/C/E, No calf tenderness  	Neurologic Exam -  	Cognitive - Awake, Alert, AAO to self, place, month. Not oriented to year or date. Difficulty following some complex commands  	Attention/Concentration - Impaired, appears to pause mid-sentence, unable to recall specific details about her past medical history  	Memory - Deferred   	Cranial Nerves - CN 2-12 grossly intact    	Motor -  	LEFT UE - ShAB 5/5, EF 5/5, EE 5/5, WE 5/5,  5/5  	RIGHT UE - ShAB 5/5, EF 5/5, EE 5/5, WE 5/5,  5/5  	LEFT LE - HF 4+/5, KE 5/5, DF 5/5, PF 5/5  	RIGHT LE - HF and KE limited. DF 5/5, PF 5/5  	Sensory - Intact to light touch  	Reflexes - Deferred   	Psychiatric - Frustrated at times during encounter   	Skin - Right knee incision with Acquacel dressing c/d/i  Wounds - None present other than above    LABS:                        11.2   10.7  )-----------( 277      ( 11 Sep 2018 05:20 )             32.2     11 Sep 2018 05:20    139    |  103    |  14     ----------------------------<  115    4.0     |  29     |  0.75     Ca    9.3        11 Sep 2018 05:20    TPro  6.2    /  Alb  2.7    /  TBili  0.9    /  DBili  x      /  AST  25     /  ALT  61     /  AlkPhos  124    11 Sep 2018 05:20    MEDICATIONS  (STANDING):  ascorbic acid 500 milliGRAM(s) Oral two times a day  calcium carbonate 1250 mG  + Vitamin D (OsCal 500 + D) 1 Tablet(s) Oral three times a day  docusate sodium 100 milliGRAM(s) Oral three times a day  enoxaparin Injectable 40 milliGRAM(s) SubCutaneous daily  lidocaine   Patch 1 Patch Transdermal daily  multivitamin 1 Tablet(s) Oral daily  oxyCODONE  ER Tablet 10 milliGRAM(s) Oral every 12 hours  traZODone 50 milliGRAM(s) Oral at bedtime  zinc sulfate 220 milliGRAM(s) Oral daily    MEDICATIONS  (PRN):  acetaminophen   Tablet .. 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    #S/p Right TKR revision  -Rehab: Begin comprehensive PT/OT for 3 hours daily. Rehab goals: Stair negotiation, ambulation at Mod I level within 7-10 days.   -Weight bearing: WBAT RLE  -Pain control: Tylenol prn, ice prn, oxycodone prn, oxycontin BID  -DVT ppx: Lovenox 40mg daily for 2 weeks followed by Aspirin 325mg twice daily for 4 weeks  -Remove Staples Post-Op Day 21; Remove Dressing Post-Op Day 10    #Neuro  -Patient with attention/concentration, working memory deficits, may be secondary to narcotic use, will monitor     #Psych, history of anxiety, depression  -On trazodone  -Takes Wellbutrin at home as well     #  -Voiding spontaneously     #GI  -Bowel regimen: Senna/colace  -Diet: Regular   -Nutrition: Vit C, MVI, zinc Ms. Jones is a 67 year old F with PMH depression/anxiety, s/p right knee arthroplasty in 2014 (initially had a fall in a school parking lot in 2005 resulting in a "broken ankle" and multiple right knee surgeries including ACL repair, meniscal repair). Per documentation she had poor post operative therapy after her TKA in 2014. She then required arthroscopic surgery in 2015. She has had several subsequent falls, the last being in May/June 2018. 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 Chavez. No immediate postoperative complications. Underwent nerve block by anesthesia on 9/7 for severe pain. On 9/7/18 noted to have leukocytosis (WBC 18). Cultures from right knee/synovial fluid as well as urine culture all negative. WBC improved to 11 by day of discharge on 9/10/18. Patient also with low grade temperature (99F -100F) last night and this AM. Otherwise, medically optimized for discharge to acute rehab.     Weight bearing: WBAT RLE  DVT ppx: Lovenox 40mg daily for 2 weeks followed by Aspirin 325mg twice daily for 4 weeks  Remove Staples Post-Op Day 21; and Remove Dressing Post-Op Day 10    SUBJECTIVE:  Seen and examined, complains of severe right knee pain, as well as difficulty sleeping last night. Last BM 2 days ago. Feels her thinking has been "cloudy."     ROS:  REVIEW OF SYMPTOMS  [X] Constitutional WNL         [X] Cardio            [X] Resp WNL           [  ] GI WNL                          [x  ]  WNL                   [X] Heme WNL              [X] Endo WNL                     [X] Skin WNL                 [  ] MSK WNL            [X] Neuro WNL                   [  ] Cognitive WNL        [X] Psych WNL    Vital Signs Last 24 Hrs  T(C): 37.1 (11 Sep 2018 07:52), Max: 37.1 (10 Sep 2018 20:51)  T(F): 98.7 (11 Sep 2018 07:52), Max: 98.8 (10 Sep 2018 20:51)  HR: 77 (11 Sep 2018 07:52) (77 - 90)  BP: 116/71 (11 Sep 2018 07:52) (113/71 - 121/76)  RR: 14 (11 Sep 2018 07:52) (14 - 14)  SpO2: 95% (11 Sep 2018 07:52) (95% - 96%)    PHYSICAL EXAM  	Constitutional - NAD, Comfortable  	HEENT - NCAT, EOMI  	Neck - Supple  	Chest - CTA bilaterally  	Cardiovascular - RRR, S1S2  	Abdomen - BS+, Soft, NTND  	Extremities - No C/C/E, No calf tenderness  	Neurologic Exam -  	Cognitive - Awake, Alert, AAO to self, place, date with prompting   	Attention/Concentration - Impaired  	Cranial Nerves - CN 2-12 grossly intact    	Motor -  	LEFT UE - ShAB 5/5, EF 5/5, EE 5/5, WE 5/5,  5/5  	RIGHT UE - ShAB 5/5, EF 5/5, EE 5/5, WE 5/5,  5/5  	LEFT LE - HF 4+/5, KE 5/5, DF 5/5, PF 5/5  	RIGHT LE - HF at least 1/5, KE and KF limited by pain, DF 5/5, PF 5/5  	Sensory - Intact to light touch  	Skin - Right knee incision with Acquacel dressing c/d/i    LABS:                      11.2   10.7  )-----------( 277      ( 11 Sep 2018 05:20 )             32.2     11 Sep 2018 05:20    139    |  103    |  14     ----------------------------<  115    4.0     |  29     |  0.75     Ca    9.3        11 Sep 2018 05:20    TPro  6.2    /  Alb  2.7    /  TBili  0.9    /  DBili  x      /  AST  25     /  ALT  61     /  AlkPhos  124    11 Sep 2018 05:20    MEDICATIONS  (STANDING):  ascorbic acid 500 milliGRAM(s) Oral two times a day  calcium carbonate 1250 mG  + Vitamin D (OsCal 500 + D) 1 Tablet(s) Oral three times a day  docusate sodium 100 milliGRAM(s) Oral three times a day  enoxaparin Injectable 40 milliGRAM(s) SubCutaneous daily  lidocaine   Patch 1 Patch Transdermal daily  multivitamin 1 Tablet(s) Oral daily  oxyCODONE  ER Tablet 10 milliGRAM(s) Oral every 12 hours  traZODone 50 milliGRAM(s) Oral at bedtime  zinc sulfate 220 milliGRAM(s) Oral daily    MEDICATIONS  (PRN):  acetaminophen   Tablet .. 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    #S/p Right TKR revision  -Rehab: Continue comprehensive PT/OT for 3 hours daily. Rehab goals: Stair negotiation, ambulation at Mod I level within 7-10 days.   -Weight bearing: WBAT RLE  -Pain control: Tylenol prn, ice prn, oxycontin 10 mg BID, change oxycodone to dilaudid 2 or 4 mg prn, lidoderm patch  -DVT ppx: Lovenox 40mg daily for 2 weeks (until 9/20) followed by Aspirin 325mg twice daily for 4 weeks  -Remove Staples Post-Op Day 21; Remove Dressing Post-Op Day 10  -Add CPM    #Neuro  -Patient with attention/concentration, working memory deficits, may be secondary to narcotic use, will monitor     #Psych, history of anxiety, depression  -On home trazodone  -Takes Wellbutrin at home (per outpatient Pharmacy takes Wellbutrin  mg BID), will restart at a lower dose     #GI  -Bowel regimen: Senna/colace, bisacodyl suppository prn  -Diet: Regular   -Nutrition: Vit C, MVI, zinc   -Transaminitis: mild elevation in Alk phos, ALT, monitor Ms. Jones is a 67 year old F with PMH depression/anxiety, s/p right knee arthroplasty in 2014 (initially had a fall in a school parking lot in 2005 resulting in a "broken ankle" and multiple right knee surgeries including ACL repair, meniscal repair). Per documentation she had poor post operative therapy after her TKA in 2014. She then required arthroscopic surgery in 2015. She has had several subsequent falls, the last being in May/June 2018. 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 Chavez. No immediate postoperative complications. Underwent nerve block by anesthesia on 9/7 for severe pain. On 9/7/18 noted to have leukocytosis (WBC 18). Cultures from right knee/synovial fluid as well as urine culture all negative. WBC improved to 11 by day of discharge on 9/10/18. Patient also with low grade temperature (99F -100F) last night and this AM. Otherwise, medically optimized for discharge to acute rehab.     Weight bearing: WBAT RLE  DVT ppx: Lovenox 40mg daily for 2 weeks followed by Aspirin 325mg twice daily for 4 weeks  Remove Staples Post-Op Day 21; and Remove Dressing Post-Op Day 10    SUBJECTIVE:  Seen and examined, complains of severe right knee pain, as well as difficulty sleeping last night. Last BM 2 days ago. Feels her thinking has been "cloudy."     ROS:  REVIEW OF SYMPTOMS  [X] Constitutional WNL         [X] Cardio            [X] Resp WNL           [  ] GI WNL                          [x  ]  WNL                   [X] Heme WNL              [X] Endo WNL                     [X] Skin WNL                 [  ] MSK WNL            [X] Neuro WNL                   [  ] Cognitive WNL        [X] Psych WNL    Vital Signs Last 24 Hrs  T(C): 37.1 (11 Sep 2018 07:52), Max: 37.1 (10 Sep 2018 20:51)  T(F): 98.7 (11 Sep 2018 07:52), Max: 98.8 (10 Sep 2018 20:51)  HR: 77 (11 Sep 2018 07:52) (77 - 90)  BP: 116/71 (11 Sep 2018 07:52) (113/71 - 121/76)  RR: 14 (11 Sep 2018 07:52) (14 - 14)  SpO2: 95% (11 Sep 2018 07:52) (95% - 96%)    PHYSICAL EXAM  	Constitutional - NAD, Comfortable  	HEENT - NCAT, EOMI  	Neck - Supple  	Chest - CTA bilaterally  	Cardiovascular - RRR, S1S2  	Abdomen - BS+, Soft, NTND  	Extremities - No C/C/E, No calf tenderness  	Neurologic Exam -  	Cognitive - Awake, Alert, AAO to self, place, date with prompting   	Attention/Concentration - Impaired  	Cranial Nerves - CN 2-12 grossly intact  	MSK-Very hesitant to bend knee. No evidence of a wound issue. Provided facilitated knee extension and flexion.     	Motor -  	LEFT UE - ShAB 5/5, EF 5/5, EE 5/5, WE 5/5,  5/5  	RIGHT UE - ShAB 5/5, EF 5/5, EE 5/5, WE 5/5,  5/5  	LEFT LE - HF 4+/5, KE 5/5, DF 5/5, PF 5/5  	RIGHT LE - HF at least 1/5, KE and KF limited by pain, DF 5/5, PF 5/5  	Sensory - Intact to light touch  	Skin - Right knee incision with Acquacel dressing c/d/i    LABS:                      11.2   10.7  )-----------( 277      ( 11 Sep 2018 05:20 )             32.2     11 Sep 2018 05:20    139    |  103    |  14     ----------------------------<  115    4.0     |  29     |  0.75     Ca    9.3        11 Sep 2018 05:20    TPro  6.2    /  Alb  2.7    /  TBili  0.9    /  DBili  x      /  AST  25     /  ALT  61     /  AlkPhos  124    11 Sep 2018 05:20    MEDICATIONS  (STANDING):  ascorbic acid 500 milliGRAM(s) Oral two times a day  calcium carbonate 1250 mG  + Vitamin D (OsCal 500 + D) 1 Tablet(s) Oral three times a day  docusate sodium 100 milliGRAM(s) Oral three times a day  enoxaparin Injectable 40 milliGRAM(s) SubCutaneous daily  lidocaine   Patch 1 Patch Transdermal daily  multivitamin 1 Tablet(s) Oral daily  oxyCODONE  ER Tablet 10 milliGRAM(s) Oral every 12 hours  traZODone 50 milliGRAM(s) Oral at bedtime  zinc sulfate 220 milliGRAM(s) Oral daily    MEDICATIONS  (PRN):  acetaminophen   Tablet .. 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    #S/p Right TKR revision  -Rehab: Continue comprehensive PT/OT for 3 hours daily. Rehab goals: Stair negotiation, ambulation at Mod I level within 7-10 days.   -Weight bearing: WBAT RLE  -Pain control: Tylenol prn, ice prn, oxycontin 10 mg BID, change oxycodone to dilaudid 2 or 4 mg prn, lidoderm patch  -DVT ppx: Lovenox 40mg daily for 2 weeks (until 9/20) followed by Aspirin 325mg twice daily for 4 weeks  -Remove Staples Post-Op Day 21; Remove Dressing Post-Op Day 10  -Add CPM    #Neuro  -Patient with attention/concentration, working memory deficits, may be secondary to narcotic use, will monitor     #Psych, history of anxiety, depression  -On home trazodone  -Takes Wellbutrin at home (per outpatient Pharmacy takes Wellbutrin  mg BID), will restart at a lower dose     #GI  -Bowel regimen: Senna/colace, bisacodyl suppository prn  -Diet: Regular   -Nutrition: Vit C, MVI, zinc   -Transaminitis: mild elevation in Alk phos, ALT, monitor

## 2018-09-11 NOTE — CONSULT NOTE ADULT - SUBJECTIVE AND OBJECTIVE BOX
Patient is a 67y old  Female who presents with a chief complaint of Im having revision of right knee arthroplasty (10 Sep 2018 14:57)    HPI:  Ms. Jones is a 67 year old F with PMH depression/anxiety, s/p right knee arthroplasty in 2014 (initially had a fall in a school parking lot in 2005 resulting in a "broken ankle" and multiple right knee surgeries including ACL repair, meniscal repair). Per documentation she had poor post operative therapy after her TKA in 2014. She then required arthroscopic surgery in 2015. She has had several subsequent falls, the last being in May/June 2018. Workup showed right knee fibrosis, thus she was admitted to [facility] for elective revision of her right knee arthroplasty on 9/6/18 by Dr. Chavez. No immediate postoperative complications. Underwent nerve block by anesthesia on 9/7 for severe pain. On 9/7/18 noted to have leukocytosis (WBC 18). Cultures from right knee/synovial fluid as well as urine culture all negative. WBC improved to 11 by day of discharge on 9/10/18. Patient also with low grade temperature (99F -100F) last night and this AM. Otherwise, medically optimized for discharge to acute rehab.     Weight bearing: WBAT RLE  DVT ppx: Lovenox 40mg daily for 2 weeks followed by Aspirin 325mg twice daily for 4 weeks  Remove Staples Post-Op Day 21; and Remove Dressing Post-Op Day 10 (10 Sep 2018 13:26)    PAST MEDICAL & SURGICAL HISTORY:  Anxiety  Depression  S/P ACL repair: right  S/P knee replacement: Right  ( 2014 )  History of Tonsillectomy (ICD9 V45.89)  H/O: Knee Surgery (ICD9 V45.89): several times  H/O: Hysterectomy (ICD9 V88.01)    SOCIAL HISTORY:  Tobacco Usage:  ( x  ) never smoked   (   ) former smoker   (   ) current smoker  (     ) pack years    Tobacco Quit Date:  Substance Use (Street drugs): ( x ) never used  (  ) other:  Alcohol Usage: 1-2 vodka daily last drink was over 3 weeks ago    Family history reviewed and otherwise non-contributory    ALLERGIES:  dust (Eye Irritation)  No Known Drug Allergies    MEDICATIONS  (STANDING):  ascorbic acid 500 milliGRAM(s) Oral two times a day  calcium carbonate 1250 mG  + Vitamin D (OsCal 500 + D) 1 Tablet(s) Oral three times a day  docusate sodium 100 milliGRAM(s) Oral three times a day  enoxaparin Injectable 40 milliGRAM(s) SubCutaneous daily  multivitamin 1 Tablet(s) Oral daily  oxyCODONE  ER Tablet 10 milliGRAM(s) Oral every 12 hours  traZODone 50 milliGRAM(s) Oral at bedtime  zinc sulfate 220 milliGRAM(s) Oral daily    MEDICATIONS  (PRN):  acetaminophen   Tablet .. 650 milliGRAM(s) Oral every 6 hours PRN Temp greater or equal to 38C (100.4F), Mild Pain (1 - 3)  oxyCODONE    IR 10 milliGRAM(s) Oral every 4 hours PRN Severe Pain (7 - 10)  oxyCODONE    IR 5 milliGRAM(s) Oral every 4 hours PRN Moderate Pain (4 - 6)  polyethylene glycol 3350 17 Gram(s) Oral daily PRN Constipation  senna 2 Tablet(s) Oral at bedtime PRN Constipation    Review of Systems: Refer to HPI for pertinent positives and negatives. All other ROS reviewed and negative except as otherwise stated above.    Vital Signs Last 24 Hrs  T(F): 98.8 (10 Sep 2018 20:51), Max: 98.8 (10 Sep 2018 11:35)  HR: 90 (10 Sep 2018 20:51) (78 - 90)  BP: 113/71 (10 Sep 2018 20:51) (113/71 - 128/66)  RR: 14 (10 Sep 2018 20:51) (14 - 18)  SpO2: 95% (10 Sep 2018 20:51) (95% - 97%)  I&O's Summary    PHYSICAL EXAM:  GENERAL: NAD, well-groomed, well-developed  HEAD:  Atraumatic, Normocephalic  EYES: EOMI, PERRLA, conjunctiva and sclera clear  ENMT: Moist mucous membranes, Good dentition  NECK: Supple, No JVD  CHEST/LUNG: Clear to auscultation bilaterally; No rales, rhonchi, wheezing, or rubs  HEART: Regular rate and rhythm; S1/S2, No murmurs, rubs, or gallops  ABDOMEN: Soft, Nontender, Nondistended; Bowel sounds present  VASCULAR: Normal pulses, Normal capillary refill  EXTREMITIES: No cyanosis, No edema, moves all 4 extremities  LYMPH: No lymphadenopathy noted  SKIN: Warm, Intact  PSYCH: Normal mood and affect  NERVOUS SYSTEM:  A/O x3, Good concentration; CN 2-12 intact, No focal deficits    LABS:                        11.2   10.7  )-----------( 277      ( 11 Sep 2018 05:20 )             32.2     09-11    139  |  103  |  14  ----------------------------<  115  4.0   |  29  |  0.75    Ca    9.3      11 Sep 2018 05:20    TPro  6.2  /  Alb  2.7  /  TBili  0.9  /  DBili  x   /  AST  25  /  ALT  61  /  AlkPhos  124  09-11    eGFR if Non African American: 82 mL/min/1.73M2 (09-11-18 @ 05:20)  eGFR if : 96 mL/min/1.73M2 (09-11-18 @ 05:20)                  CAPILLARY BLOOD GLUCOSE        08-23 [id number] 5.3        Culture - Surgical Swab (collected 07 Sep 2018 00:32)  Source: .Surgical Swab RIGHT SYNOVIAL FLUID  Preliminary Report (07 Sep 2018 20:44):    No growth to date.    Culture - Surgical Swab (collected 07 Sep 2018 00:32)  Source: .Surgical Swab RIGHT TIBIAL CANAL  Preliminary Report (07 Sep 2018 20:47):    No growth to date.    Culture - Surgical Swab (collected 07 Sep 2018 00:32)  Source: .Surgical Swab RIGHT FEMORAL CANAL  Preliminary Report (07 Sep 2018 20:45):    No growth to date.    Culture - Surgical Swab (collected 07 Sep 2018 00:32)  Source: .Surgical Swab RIGHT KNEE FEMUR  Preliminary Report (07 Sep 2018 20:48):    No growth to date.    Culture - Surgical Swab (collected 07 Sep 2018 00:32)  Source: .Surgical Swab RIGHT KNEE TIBIA  Preliminary Report (07 Sep 2018 20:46):    No growth to date.    Culture - Urine (collected 06 Sep 2018 23:28)  Source: .Urine Clean Catch (Midstream)  Final Report (07 Sep 2018 22:54):    No growth      RADIOLOGY & ADDITIONAL TESTS:    Care Discussed with Consultants/Other Providers: Patient is a 67y old  Female who presents with a chief complaint of Im having revision of right knee arthroplasty (10 Sep 2018 14:57)    HPI:  Ms. Jones is a 67 year old F with PMH depression/anxiety, s/p right knee arthroplasty in 2014 (initially had a fall in a school parking lot in 2005 resulting in a "broken ankle" and multiple right knee surgeries including ACL repair, meniscal repair). Per documentation she had poor post operative therapy after her TKA in 2014. She then required arthroscopic surgery in 2015. She has had several subsequent falls, the last being in May/June 2018. Workup showed right knee fibrosis, thus she was admitted to [facility] for elective revision of her right knee arthroplasty on 9/6/18 by Dr. Chavez. No immediate postoperative complications. Underwent nerve block by anesthesia on 9/7 for severe pain. On 9/7/18 noted to have leukocytosis (WBC 18). Cultures from right knee/synovial fluid as well as urine culture all negative. WBC improved to 11 by day of discharge on 9/10/18. Patient also with low grade temperature (99F -100F) last night and this AM. Otherwise, medically optimized for discharge to acute rehab.     Weight bearing: WBAT RLE  DVT ppx: Lovenox 40mg daily for 2 weeks followed by Aspirin 325mg twice daily for 4 weeks  Remove Staples Post-Op Day 21; and Remove Dressing Post-Op Day 10 (10 Sep 2018 13:26)    PAST MEDICAL & SURGICAL HISTORY:  Anxiety  Depression  S/P ACL repair: right  S/P knee replacement: Right  ( 2014 )  History of Tonsillectomy (ICD9 V45.89)  H/O: Knee Surgery (ICD9 V45.89): several times  H/O: Hysterectomy (ICD9 V88.01)    SOCIAL HISTORY:  Tobacco Usage:  ( x  ) never smoked   (   ) former smoker   (   ) current smoker  (     ) pack years    Tobacco Quit Date:  Substance Use (Street drugs): ( x ) never used  (  ) other:  Alcohol Usage: 1-2 vodka daily last drink was over 3 weeks ago    Family history reviewed and otherwise non-contributory    ALLERGIES:  dust (Eye Irritation)  No Known Drug Allergies    MEDICATIONS  (STANDING):  ascorbic acid 500 milliGRAM(s) Oral two times a day  calcium carbonate 1250 mG  + Vitamin D (OsCal 500 + D) 1 Tablet(s) Oral three times a day  docusate sodium 100 milliGRAM(s) Oral three times a day  enoxaparin Injectable 40 milliGRAM(s) SubCutaneous daily  multivitamin 1 Tablet(s) Oral daily  oxyCODONE  ER Tablet 10 milliGRAM(s) Oral every 12 hours  traZODone 50 milliGRAM(s) Oral at bedtime  zinc sulfate 220 milliGRAM(s) Oral daily    MEDICATIONS  (PRN):  acetaminophen   Tablet .. 650 milliGRAM(s) Oral every 6 hours PRN Temp greater or equal to 38C (100.4F), Mild Pain (1 - 3)  oxyCODONE    IR 10 milliGRAM(s) Oral every 4 hours PRN Severe Pain (7 - 10)  oxyCODONE    IR 5 milliGRAM(s) Oral every 4 hours PRN Moderate Pain (4 - 6)  polyethylene glycol 3350 17 Gram(s) Oral daily PRN Constipation  senna 2 Tablet(s) Oral at bedtime PRN Constipation    Review of Systems: Refer to HPI for pertinent positives and negatives. All other ROS reviewed and negative except as otherwise stated above.    Vital Signs Last 24 Hrs  T(F): 98.8 (10 Sep 2018 20:51), Max: 98.8 (10 Sep 2018 11:35)  HR: 90 (10 Sep 2018 20:51) (78 - 90)  BP: 113/71 (10 Sep 2018 20:51) (113/71 - 128/66)  RR: 14 (10 Sep 2018 20:51) (14 - 18)  SpO2: 95% (10 Sep 2018 20:51) (95% - 97%)  I&O's Summary    PHYSICAL EXAM:  GENERAL: NAD, well-groomed, well-developed  HEAD:  Atraumatic, Normocephalic  EYES: EOMI, PERRLA, conjunctiva and sclera clear  ENMT: Moist mucous membranes, Good dentition  NECK: Supple, No JVD  CHEST/LUNG: Clear to auscultation bilaterally; No rales, rhonchi, wheezing, or rubs  HEART: Regular rate and rhythm; S1/S2, No murmurs, rubs, or gallops  ABDOMEN: Soft, Nontender, Nondistended; Bowel sounds present  VASCULAR: Normal pulses, Normal capillary refill  EXTREMITIES: right knee dressing in place + swelling +tenderness  to movement no signs of active infection at this time   LYMPH: No lymphadenopathy noted  SKIN: Warm, Intact  PSYCH: Normal mood and affect  NERVOUS SYSTEM:  A/O x3, Good concentration; CN 2-12 intact, No focal deficits    LABS:                        11.2   10.7  )-----------( 277      ( 11 Sep 2018 05:20 )             32.2     09-11    139  |  103  |  14  ----------------------------<  115  4.0   |  29  |  0.75    Ca    9.3      11 Sep 2018 05:20    TPro  6.2  /  Alb  2.7  /  TBili  0.9  /  DBili  x   /  AST  25  /  ALT  61  /  AlkPhos  124  09-11    eGFR if Non African American: 82 mL/min/1.73M2 (09-11-18 @ 05:20)  eGFR if : 96 mL/min/1.73M2 (09-11-18 @ 05:20)                  CAPILLARY BLOOD GLUCOSE        08-23 [id number] 5.3        Culture - Surgical Swab (collected 07 Sep 2018 00:32)  Source: .Surgical Swab RIGHT SYNOVIAL FLUID  Preliminary Report (07 Sep 2018 20:44):    No growth to date.    Culture - Surgical Swab (collected 07 Sep 2018 00:32)  Source: .Surgical Swab RIGHT TIBIAL CANAL  Preliminary Report (07 Sep 2018 20:47):    No growth to date.    Culture - Surgical Swab (collected 07 Sep 2018 00:32)  Source: .Surgical Swab RIGHT FEMORAL CANAL  Preliminary Report (07 Sep 2018 20:45):    No growth to date.    Culture - Surgical Swab (collected 07 Sep 2018 00:32)  Source: .Surgical Swab RIGHT KNEE FEMUR  Preliminary Report (07 Sep 2018 20:48):    No growth to date.    Culture - Surgical Swab (collected 07 Sep 2018 00:32)  Source: .Surgical Swab RIGHT KNEE TIBIA  Preliminary Report (07 Sep 2018 20:46):    No growth to date.    Culture - Urine (collected 06 Sep 2018 23:28)  Source: .Urine Clean Catch (Midstream)  Final Report (07 Sep 2018 22:54):    No growth      RADIOLOGY & ADDITIONAL TESTS:    Care Discussed with Consultants/Other Providers:

## 2018-09-11 NOTE — CHART NOTE - NSCHARTNOTEFT_GEN_A_CORE
REHABILITATION DIAGNOSIS/IMPAIRMENT GROUP CODE:  Right TKA revision     COMORBIDITIES/COMPLICATING CONDITIONS IMPACTING REHABILITATION:  HEALTH ISSUES - PROBLEM Dx:  Anxiety  Pain control  Nutrition    PAST MEDICAL & SURGICAL HISTORY:  Anxiety  Depression  S/P ACL repair: right  S/P knee replacement: Right  ( 2014 )  History of Tonsillectomy (ICD9 V45.89)  H/O: Knee Surgery (ICD9 V45.89): several times  H/O: Hysterectomy (ICD9 V88.01)    Based upon consideration of the patient's impairments, functional status, complicating conditions and any other contributing factors and after information garnered from the assessments of all therapy disciplines involved in treating the patient and other pertinent clinicians:    INTERDISCIPLINARY REHABILITATION INTERVENTIONS:    Transfer Training  Bed Mobility  Therapeutic Exercise  Balance/Coordination Exercises  Locomotion retraining  Stairs  Functional Transfer Training  Bowel/Bladder program  Pain Management  Skin/Wound Care  Activities of Daily Living  Neuropsych Therapy  Patient/Family Counseling  Family Training    MEDICAL PROGNOSIS:  Good    REHAB POTENTIAL:  Good    EXPECTED DAILY THERAPY:         PT: 1.5 hr       OT: 1.5 hr       EXPECTED FREQUENCY OF PROGRAM:  3 hours per day  and 5 days a week    ESTIMATED LOS:  10-14 days    ESTIMATED DISPOSITION:  home with home care    INTERDISCIPLINARY FUNCTIONAL OUTCOMES/GOALS:         Gait/Mobility: Mod I       Transfers: Mod I       ADLs: Mod I       Functional Transfers: Mod I       Medication Management: Independent       Dysphagia: Mod I       Bladder: Mod I       Bowel: Mod I

## 2018-09-12 PROCEDURE — 99233 SBSQ HOSP IP/OBS HIGH 50: CPT

## 2018-09-12 PROCEDURE — 99232 SBSQ HOSP IP/OBS MODERATE 35: CPT

## 2018-09-12 RX ORDER — CELECOXIB 200 MG/1
100 CAPSULE ORAL ONCE
Qty: 0 | Refills: 0 | Status: COMPLETED | OUTPATIENT
Start: 2018-09-12 | End: 2018-09-13

## 2018-09-12 RX ADMIN — HYDROMORPHONE HYDROCHLORIDE 4 MILLIGRAM(S): 2 INJECTION INTRAMUSCULAR; INTRAVENOUS; SUBCUTANEOUS at 07:57

## 2018-09-12 RX ADMIN — HYDROMORPHONE HYDROCHLORIDE 4 MILLIGRAM(S): 2 INJECTION INTRAMUSCULAR; INTRAVENOUS; SUBCUTANEOUS at 08:26

## 2018-09-12 RX ADMIN — Medication 100 MILLIGRAM(S): at 21:36

## 2018-09-12 RX ADMIN — ENOXAPARIN SODIUM 40 MILLIGRAM(S): 100 INJECTION SUBCUTANEOUS at 12:30

## 2018-09-12 RX ADMIN — OXYCODONE HYDROCHLORIDE 10 MILLIGRAM(S): 5 TABLET ORAL at 17:26

## 2018-09-12 RX ADMIN — HYDROMORPHONE HYDROCHLORIDE 4 MILLIGRAM(S): 2 INJECTION INTRAMUSCULAR; INTRAVENOUS; SUBCUTANEOUS at 20:28

## 2018-09-12 RX ADMIN — CELECOXIB 100 MILLIGRAM(S): 200 CAPSULE ORAL at 17:26

## 2018-09-12 RX ADMIN — OXYCODONE HYDROCHLORIDE 10 MILLIGRAM(S): 5 TABLET ORAL at 05:39

## 2018-09-12 RX ADMIN — Medication 650 MILLIGRAM(S): at 12:51

## 2018-09-12 RX ADMIN — Medication 1 TABLET(S): at 05:39

## 2018-09-12 RX ADMIN — Medication 50 MILLIGRAM(S): at 21:36

## 2018-09-12 RX ADMIN — HYDROMORPHONE HYDROCHLORIDE 4 MILLIGRAM(S): 2 INJECTION INTRAMUSCULAR; INTRAVENOUS; SUBCUTANEOUS at 13:41

## 2018-09-12 RX ADMIN — Medication 1 TABLET(S): at 12:30

## 2018-09-12 RX ADMIN — OXYCODONE HYDROCHLORIDE 10 MILLIGRAM(S): 5 TABLET ORAL at 06:10

## 2018-09-12 RX ADMIN — Medication 100 MILLIGRAM(S): at 05:39

## 2018-09-12 RX ADMIN — BUPROPION HYDROCHLORIDE 100 MILLIGRAM(S): 150 TABLET, EXTENDED RELEASE ORAL at 07:57

## 2018-09-12 RX ADMIN — ZINC SULFATE TAB 220 MG (50 MG ZINC EQUIVALENT) 220 MILLIGRAM(S): 220 (50 ZN) TAB at 12:30

## 2018-09-12 RX ADMIN — OXYCODONE HYDROCHLORIDE 10 MILLIGRAM(S): 5 TABLET ORAL at 17:52

## 2018-09-12 RX ADMIN — HYDROMORPHONE HYDROCHLORIDE 4 MILLIGRAM(S): 2 INJECTION INTRAMUSCULAR; INTRAVENOUS; SUBCUTANEOUS at 19:44

## 2018-09-12 RX ADMIN — CELECOXIB 100 MILLIGRAM(S): 200 CAPSULE ORAL at 17:52

## 2018-09-12 RX ADMIN — HYDROMORPHONE HYDROCHLORIDE 4 MILLIGRAM(S): 2 INJECTION INTRAMUSCULAR; INTRAVENOUS; SUBCUTANEOUS at 14:30

## 2018-09-12 RX ADMIN — GABAPENTIN 100 MILLIGRAM(S): 400 CAPSULE ORAL at 21:36

## 2018-09-12 RX ADMIN — Medication 650 MILLIGRAM(S): at 12:30

## 2018-09-12 RX ADMIN — Medication 500 MILLIGRAM(S): at 05:39

## 2018-09-12 RX ADMIN — BUPROPION HYDROCHLORIDE 100 MILLIGRAM(S): 150 TABLET, EXTENDED RELEASE ORAL at 13:22

## 2018-09-12 RX ADMIN — Medication 100 MILLIGRAM(S): at 13:22

## 2018-09-12 RX ADMIN — CELECOXIB 100 MILLIGRAM(S): 200 CAPSULE ORAL at 05:39

## 2018-09-12 RX ADMIN — Medication 1 TABLET(S): at 13:22

## 2018-09-12 RX ADMIN — CELECOXIB 100 MILLIGRAM(S): 200 CAPSULE ORAL at 06:10

## 2018-09-12 RX ADMIN — Medication 1 TABLET(S): at 21:36

## 2018-09-12 RX ADMIN — Medication 500 MILLIGRAM(S): at 17:26

## 2018-09-12 RX ADMIN — LIDOCAINE 2 PATCH: 4 CREAM TOPICAL at 12:30

## 2018-09-12 NOTE — PROGRESS NOTE ADULT - SUBJECTIVE AND OBJECTIVE BOX
Patient is a 67y old  Female who presents with a chief complaint of Revision of right TKR with severe pain. (11 Sep 2018 12:31)      Patient seen and examined at bedside.    ALLERGIES:  dust (Eye Irritation)  No Known Drug Allergies    MEDICATIONS:  bisacodyl Suppository 10 milliGRAM(s) Rectal daily PRN  buPROPion  milliGRAM(s) Oral two times a day  celecoxib 100 milliGRAM(s) Oral two times a day  gabapentin 100 milliGRAM(s) Oral at bedtime  HYDROmorphone   Tablet 2 milliGRAM(s) Oral every 6 hours PRN  HYDROmorphone   Tablet 4 milliGRAM(s) Oral every 6 hours PRN  lidocaine   Patch 2 Patch Transdermal daily  oxyCODONE  ER Tablet 10 milliGRAM(s) Oral every 12 hours    Vital Signs Last 24 Hrs  T(F): 98.1 (12 Sep 2018 07:49), Max: 98.3 (11 Sep 2018 20:29)  HR: 74 (12 Sep 2018 07:49) (72 - 74)  BP: 118/78 (12 Sep 2018 07:49) (118/78 - 121/75)  RR: 14 (12 Sep 2018 07:49) (14 - 14)  SpO2: 95% (12 Sep 2018 07:49) (95% - 97%)  I&O's Summary    11 Sep 2018 07:01  -  12 Sep 2018 07:00  --------------------------------------------------------  IN: 360 mL / OUT: 0 mL / NET: 360 mL        PHYSICAL EXAM:  General: NAD, A/O x 3  ENT: MMM  Neck: Supple, No JVD  Lungs: Clear to auscultation bilaterally  Cardio: RRR, S1/S2, No murmurs  Abdomen: Soft, Nontender, Nondistended; Bowel sounds present  Extremities: No cyanosis,right knee mild swelling dressing in place + tenderness with movement   LABS:                        11.2   10.7  )-----------( 277      ( 11 Sep 2018 05:20 )             32.2     09-11    139  |  103  |  14  ----------------------------<  115  4.0   |  29  |  0.75    Ca    9.3      11 Sep 2018 05:20    TPro  6.2  /  Alb  2.7  /  TBili  0.9  /  DBili  x   /  AST  25  /  ALT  61  /  AlkPhos  124  09-11    eGFR if Non African American: 82 mL/min/1.73M2 (09-11-18 @ 05:20)  eGFR if : 96 mL/min/1.73M2 (09-11-18 @ 05:20)                    CAPILLARY BLOOD GLUCOSE        08-23 [id number] 5.3          RADIOLOGY & ADDITIONAL TESTS:    Care Discussed with Consultants/Other Providers:

## 2018-09-12 NOTE — PROGRESS NOTE ADULT - SUBJECTIVE AND OBJECTIVE BOX
Ms. Jones is a 67 year old F with PMH depression/anxiety, s/p right knee arthroplasty in 2014 (initially had a fall in a school parking lot in 2005 resulting in a "broken ankle" and multiple right knee surgeries including ACL repair, meniscal repair). Per documentation she had poor post operative therapy after her TKA in 2014. She then required arthroscopic surgery in 2015. She has had several subsequent falls, the last being in May/June 2018. 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 Chavez. No immediate postoperative complications. Underwent nerve block by anesthesia on 9/7 for severe pain. On 9/7/18 noted to have leukocytosis (WBC 18). Cultures from right knee/synovial fluid as well as urine culture all negative. WBC improved to 11 by day of discharge on 9/10/18. Patient also with low grade temperature (99F -100F) last night and this AM. Otherwise, medically optimized for discharge to acute rehab.     Weight bearing: WBAT RLE  DVT ppx: Lovenox 40mg daily for 2 weeks followed by Aspirin 325mg twice daily for 4 weeks  Remove Staples Post-Op Day 21; and Remove Dressing Post-Op Day 10    SUBJECTIVE:  Seen and examined, pain much improved since yesterday, now 7/10. Feels celebrex is helping her pain. She was able to participate in therapy, has been using CPM machine. Had a small BM today.     ROS:  REVIEW OF SYMPTOMS  [X] Constitutional WNL         [X] Cardio            [X] Resp WNL           [  ] GI WNL                          [x  ]  WNL                   [X] Heme WNL              [X] Endo WNL                     [X] Skin WNL                 [  ] MSK WNL            [X] Neuro WNL                   [  ] Cognitive WNL        [X] Psych WNL    Vital Signs Last 24 Hrs  T(C): 37.1 (11 Sep 2018 07:52), Max: 37.1 (10 Sep 2018 20:51)  T(F): 98.7 (11 Sep 2018 07:52), Max: 98.8 (10 Sep 2018 20:51)  HR: 77 (11 Sep 2018 07:52) (77 - 90)  BP: 116/71 (11 Sep 2018 07:52) (113/71 - 121/76)  RR: 14 (11 Sep 2018 07:52) (14 - 14)  SpO2: 95% (11 Sep 2018 07:52) (95% - 96%)    PHYSICAL EXAM  	Constitutional - NAD, Comfortable  	HEENT - NCAT, EOMI  	Neck - Supple  	Chest - CTA bilaterally  	Cardiovascular - RRR, S1S2  	Abdomen - BS+, Soft, NTND  	Extremities - No C/C/E, No calf tenderness  	Neurologic Exam -  	Cognitive - Awake, Alert, AAO to self, place, date with prompting   	Attention/Concentration - Impaired  	Cranial Nerves - CN 2-12 grossly intact  	MSK-Very hesitant to bend knee. No evidence of a wound issue. Provided facilitated knee extension and flexion.     	Motor -  	LEFT UE - ShAB 5/5, EF 5/5, EE 5/5, WE 5/5,  5/5  	RIGHT UE - ShAB 5/5, EF 5/5, EE 5/5, WE 5/5,  5/5  	LEFT LE - HF 4+/5, KE 5/5, DF 5/5, PF 5/5  	RIGHT LE - HF at least 1/5, KE and KF limited by pain, DF 5/5, PF 5/5  	Sensory - Intact to light touch  	Skin - Right knee incision with Acquacel dressing c/d/i    LABS:                      11.2   10.7  )-----------( 277      ( 11 Sep 2018 05:20 )             32.2     11 Sep 2018 05:20    139    |  103    |  14     ----------------------------<  115    4.0     |  29     |  0.75     Ca    9.3        11 Sep 2018 05:20    TPro  6.2    /  Alb  2.7    /  TBili  0.9    /  DBili  x      /  AST  25     /  ALT  61     /  AlkPhos  124    11 Sep 2018 05:20    MEDICATIONS  (STANDING):  ascorbic acid 500 milliGRAM(s) Oral two times a day  calcium carbonate 1250 mG  + Vitamin D (OsCal 500 + D) 1 Tablet(s) Oral three times a day  docusate sodium 100 milliGRAM(s) Oral three times a day  enoxaparin Injectable 40 milliGRAM(s) SubCutaneous daily  lidocaine   Patch 1 Patch Transdermal daily  multivitamin 1 Tablet(s) Oral daily  oxyCODONE  ER Tablet 10 milliGRAM(s) Oral every 12 hours  traZODone 50 milliGRAM(s) Oral at bedtime  zinc sulfate 220 milliGRAM(s) Oral daily    MEDICATIONS  (PRN):  acetaminophen   Tablet .. 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    #S/p Right TKR revision  -Rehab: Continue comprehensive PT/OT for 3 hours daily. Rehab goals: Stair negotiation, ambulation at Mod I level within 7-10 days.   -Weight bearing: WBAT RLE  -Pain control: Tylenol prn, ice prn, oxycontin 10 mg BID, change oxycodone to dilaudid 2 or 4 mg prn, lidoderm patch  -DVT ppx: Lovenox 40mg daily for 2 weeks (until 9/20) followed by Aspirin 325mg twice daily for 4 weeks  -Remove Staples Post-Op Day 21; Remove Dressing Post-Op Day 10  -Add CPM    #Neuro  -Patient with attention/concentration, working memory deficits, may be secondary to narcotic use, will monitor     #Psych, history of anxiety, depression  -On home trazodone  -Takes Wellbutrin at home (per outpatient Pharmacy takes Wellbutrin  mg BID), will restart at a lower dose     #GI  -Bowel regimen: Senna/colace, bisacodyl suppository prn  -Diet: Regular   -Nutrition: Vit C, MVI, zinc   -Transaminitis: mild elevation in Alk phos, ALT, monitor Ms. Jones is a 67 year old F with PMH depression/anxiety, s/p right knee arthroplasty in 2014 (initially had a fall in a school parking lot in 2005 resulting in a "broken ankle" and multiple right knee surgeries including ACL repair, meniscal repair). Per documentation she had poor post operative therapy after her TKA in 2014. She then required arthroscopic surgery in 2015. She has had several subsequent falls, the last being in May/June 2018. 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 Chavez. No immediate postoperative complications. Underwent nerve block by anesthesia on 9/7 for severe pain. On 9/7/18 noted to have leukocytosis (WBC 18). Cultures from right knee/synovial fluid as well as urine culture all negative. WBC improved to 11 by day of discharge on 9/10/18. Patient also with low grade temperature (99F -100F) last night and this AM. Otherwise, medically optimized for discharge to acute rehab.     Weight bearing: WBAT RLE  DVT ppx: Lovenox 40mg daily for 2 weeks followed by Aspirin 325mg twice daily for 4 weeks  Remove Staples Post-Op Day 21; and Remove Dressing Post-Op Day 10    SUBJECTIVE:  Seen and examined, pain much improved since yesterday, now 7/10. Feels celebrex is helping her pain. She was able to participate in therapy, has been using CPM machine. Had a small BM today.     ROS:  REVIEW OF SYMPTOMS  [X] Constitutional WNL         [X] Cardio            [X] Resp WNL           [  ] GI WNL                          [x  ]  WNL                   [X] Heme WNL              [X] Endo WNL                     [X] Skin WNL                 [  ] MSK WNL            [X] Neuro WNL                   [  ] Cognitive WNL        [X] Psych WNL    Vital Signs Last 24 Hrs  T(C): 37.1 (11 Sep 2018 07:52), Max: 37.1 (10 Sep 2018 20:51)  T(F): 98.7 (11 Sep 2018 07:52), Max: 98.8 (10 Sep 2018 20:51)  HR: 77 (11 Sep 2018 07:52) (77 - 90)  BP: 116/71 (11 Sep 2018 07:52) (113/71 - 121/76)  RR: 14 (11 Sep 2018 07:52) (14 - 14)  SpO2: 95% (11 Sep 2018 07:52) (95% - 96%)    PHYSICAL EXAM  	Constitutional - NAD, Comfortable  	HEENT - NCAT, EOMI  	Neck - Supple  	Chest - CTA bilaterally  	Cardiovascular - RRR, S1S2  	Abdomen - BS+, Soft, NTND  	Extremities - No C/C/E, No calf tenderness  	Neurologic Exam -  	Cognitive - Awake, Alert, AAO to self, place, date with prompting   	Attention/Concentration - Impaired  	Cranial Nerves - CN 2-12 grossly intact  	MSK-Very hesitant to bend knee. No evidence of a wound issue. Provided facilitated knee extension and flexion.     	Motor -  	LEFT UE - ShAB 5/5, EF 5/5, EE 5/5, WE 5/5,  5/5  	RIGHT UE - ShAB 5/5, EF 5/5, EE 5/5, WE 5/5,  5/5  	LEFT LE - HF 4+/5, KE 5/5, DF 5/5, PF 5/5  	RIGHT LE - HF at least 1/5, KE and KF limited by pain, DF 5/5, PF 5/5  	Sensory - Intact to light touch  	Skin - Right knee incision with Acquacel dressing c/d/i    LABS:                      11.2   10.7  )-----------( 277      ( 11 Sep 2018 05:20 )             32.2     11 Sep 2018 05:20    139    |  103    |  14     ----------------------------<  115    4.0     |  29     |  0.75     Ca    9.3        11 Sep 2018 05:20    TPro  6.2    /  Alb  2.7    /  TBili  0.9    /  DBili  x      /  AST  25     /  ALT  61     /  AlkPhos  124    11 Sep 2018 05:20    MEDICATIONS  (STANDING):  ascorbic acid 500 milliGRAM(s) Oral two times a day  calcium carbonate 1250 mG  + Vitamin D (OsCal 500 + D) 1 Tablet(s) Oral three times a day  docusate sodium 100 milliGRAM(s) Oral three times a day  enoxaparin Injectable 40 milliGRAM(s) SubCutaneous daily  lidocaine   Patch 1 Patch Transdermal daily  multivitamin 1 Tablet(s) Oral daily  oxyCODONE  ER Tablet 10 milliGRAM(s) Oral every 12 hours  traZODone 50 milliGRAM(s) Oral at bedtime  zinc sulfate 220 milliGRAM(s) Oral daily    MEDICATIONS  (PRN):  acetaminophen   Tablet .. 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    #S/p Right TKR revision  -Rehab: Continue comprehensive PT/OT for 3 hours daily. Rehab goals: Stair negotiation, ambulation at Mod I level within 7-10 days.   -Weight bearing: WBAT RLE  -Pain control: Tylenol prn, ice prn, oxycontin 10 mg BID, change oxycodone to dilaudid 2 or 4 mg prn, lidoderm patch  -DVT ppx: Lovenox 40mg daily for 2 weeks (until 9/20) followed by Aspirin 325mg twice daily for 4 weeks  -Remove Staples Post-Op Day 21; Remove Dressing Post-Op Day 10  -Add CPM    #Neuro  -Patient with attention/concentration, working memory deficits, may be secondary to narcotic use. This has drastically improved and is now inapparent.     #Psych, history of anxiety, depression  -On home trazodone  -Takes Wellbutrin at home (per outpatient Pharmacy takes Wellbutrin  mg BID), will restart at a lower dose     #GI  -Bowel regimen: Senna/colace, bisacodyl suppository prn  -Diet: Regular   -Nutrition: Vit C, MVI, zinc   -Transaminitis: mild elevation in Alk phos, ALT, monitor

## 2018-09-13 DIAGNOSIS — F32.9 MAJOR DEPRESSIVE DISORDER, SINGLE EPISODE, UNSPECIFIED: ICD-10-CM

## 2018-09-13 DIAGNOSIS — T84.092A OTHER MECHANICAL COMPLICATION OF INTERNAL RIGHT KNEE PROSTHESIS, INITIAL ENCOUNTER: ICD-10-CM
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
## 2018-09-13 DIAGNOSIS — F41.9 ANXIETY DISORDER, UNSPECIFIED: ICD-10-CM

## 2018-09-13 DIAGNOSIS — Z90.710 ACQUIRED ABSENCE OF BOTH CERVIX AND UTERUS: ICD-10-CM

## 2018-09-13 DIAGNOSIS — M24.661 ANKYLOSIS, RIGHT KNEE: ICD-10-CM

## 2018-09-13 PROCEDURE — 99233 SBSQ HOSP IP/OBS HIGH 50: CPT | Mod: GC

## 2018-09-13 PROCEDURE — 99232 SBSQ HOSP IP/OBS MODERATE 35: CPT

## 2018-09-13 RX ORDER — HYDROMORPHONE HYDROCHLORIDE 2 MG/ML
4 INJECTION INTRAMUSCULAR; INTRAVENOUS; SUBCUTANEOUS EVERY 4 HOURS
Qty: 0 | Refills: 0 | Status: DISCONTINUED | OUTPATIENT
Start: 2018-09-13 | End: 2018-09-20

## 2018-09-13 RX ORDER — SENNA PLUS 8.6 MG/1
2 TABLET ORAL AT BEDTIME
Qty: 0 | Refills: 0 | Status: DISCONTINUED | OUTPATIENT
Start: 2018-09-13 | End: 2018-09-20

## 2018-09-13 RX ORDER — CELECOXIB 200 MG/1
200 CAPSULE ORAL EVERY 12 HOURS
Qty: 0 | Refills: 0 | Status: DISCONTINUED | OUTPATIENT
Start: 2018-09-13 | End: 2018-09-19

## 2018-09-13 RX ORDER — BUPROPION HYDROCHLORIDE 150 MG/1
200 TABLET, EXTENDED RELEASE ORAL
Qty: 0 | Refills: 0 | Status: DISCONTINUED | OUTPATIENT
Start: 2018-09-13 | End: 2018-09-20

## 2018-09-13 RX ORDER — LANOLIN ALCOHOL/MO/W.PET/CERES
6 CREAM (GRAM) TOPICAL AT BEDTIME
Qty: 0 | Refills: 0 | Status: DISCONTINUED | OUTPATIENT
Start: 2018-09-13 | End: 2018-09-20

## 2018-09-13 RX ORDER — PANTOPRAZOLE SODIUM 20 MG/1
40 TABLET, DELAYED RELEASE ORAL
Qty: 0 | Refills: 0 | Status: DISCONTINUED | OUTPATIENT
Start: 2018-09-13 | End: 2018-09-20

## 2018-09-13 RX ORDER — OXYCODONE HYDROCHLORIDE 5 MG/1
20 TABLET ORAL
Qty: 0 | Refills: 0 | Status: DISCONTINUED | OUTPATIENT
Start: 2018-09-13 | End: 2018-09-14

## 2018-09-13 RX ORDER — OXYCODONE HYDROCHLORIDE 5 MG/1
10 TABLET ORAL
Qty: 0 | Refills: 0 | Status: DISCONTINUED | OUTPATIENT
Start: 2018-09-13 | End: 2018-09-14

## 2018-09-13 RX ADMIN — CELECOXIB 100 MILLIGRAM(S): 200 CAPSULE ORAL at 06:36

## 2018-09-13 RX ADMIN — LIDOCAINE 2 PATCH: 4 CREAM TOPICAL at 13:00

## 2018-09-13 RX ADMIN — Medication 6 MILLIGRAM(S): at 22:21

## 2018-09-13 RX ADMIN — PANTOPRAZOLE SODIUM 40 MILLIGRAM(S): 20 TABLET, DELAYED RELEASE ORAL at 14:02

## 2018-09-13 RX ADMIN — CELECOXIB 200 MILLIGRAM(S): 200 CAPSULE ORAL at 18:48

## 2018-09-13 RX ADMIN — Medication 500 MILLIGRAM(S): at 17:22

## 2018-09-13 RX ADMIN — BUPROPION HYDROCHLORIDE 100 MILLIGRAM(S): 150 TABLET, EXTENDED RELEASE ORAL at 10:02

## 2018-09-13 RX ADMIN — LIDOCAINE 2 PATCH: 4 CREAM TOPICAL at 08:21

## 2018-09-13 RX ADMIN — CELECOXIB 100 MILLIGRAM(S): 200 CAPSULE ORAL at 00:45

## 2018-09-13 RX ADMIN — ZINC SULFATE TAB 220 MG (50 MG ZINC EQUIVALENT) 220 MILLIGRAM(S): 220 (50 ZN) TAB at 13:05

## 2018-09-13 RX ADMIN — Medication 1 TABLET(S): at 06:36

## 2018-09-13 RX ADMIN — Medication 100 MILLIGRAM(S): at 14:03

## 2018-09-13 RX ADMIN — Medication 1 TABLET(S): at 14:03

## 2018-09-13 RX ADMIN — LIDOCAINE 2 PATCH: 4 CREAM TOPICAL at 00:07

## 2018-09-13 RX ADMIN — GABAPENTIN 100 MILLIGRAM(S): 400 CAPSULE ORAL at 21:11

## 2018-09-13 RX ADMIN — Medication 100 MILLIGRAM(S): at 21:11

## 2018-09-13 RX ADMIN — Medication 50 MILLIGRAM(S): at 21:11

## 2018-09-13 RX ADMIN — HYDROMORPHONE HYDROCHLORIDE 4 MILLIGRAM(S): 2 INJECTION INTRAMUSCULAR; INTRAVENOUS; SUBCUTANEOUS at 20:37

## 2018-09-13 RX ADMIN — HYDROMORPHONE HYDROCHLORIDE 4 MILLIGRAM(S): 2 INJECTION INTRAMUSCULAR; INTRAVENOUS; SUBCUTANEOUS at 19:55

## 2018-09-13 RX ADMIN — OXYCODONE HYDROCHLORIDE 20 MILLIGRAM(S): 5 TABLET ORAL at 21:33

## 2018-09-13 RX ADMIN — HYDROMORPHONE HYDROCHLORIDE 4 MILLIGRAM(S): 2 INJECTION INTRAMUSCULAR; INTRAVENOUS; SUBCUTANEOUS at 09:07

## 2018-09-13 RX ADMIN — ENOXAPARIN SODIUM 40 MILLIGRAM(S): 100 INJECTION SUBCUTANEOUS at 13:04

## 2018-09-13 RX ADMIN — OXYCODONE HYDROCHLORIDE 10 MILLIGRAM(S): 5 TABLET ORAL at 08:21

## 2018-09-13 RX ADMIN — CELECOXIB 100 MILLIGRAM(S): 200 CAPSULE ORAL at 08:19

## 2018-09-13 RX ADMIN — CELECOXIB 200 MILLIGRAM(S): 200 CAPSULE ORAL at 17:21

## 2018-09-13 RX ADMIN — OXYCODONE HYDROCHLORIDE 10 MILLIGRAM(S): 5 TABLET ORAL at 08:20

## 2018-09-13 RX ADMIN — Medication 500 MILLIGRAM(S): at 06:36

## 2018-09-13 RX ADMIN — OXYCODONE HYDROCHLORIDE 10 MILLIGRAM(S): 5 TABLET ORAL at 06:36

## 2018-09-13 RX ADMIN — Medication 1 TABLET(S): at 21:11

## 2018-09-13 RX ADMIN — CELECOXIB 100 MILLIGRAM(S): 200 CAPSULE ORAL at 00:00

## 2018-09-13 RX ADMIN — Medication 100 MILLIGRAM(S): at 06:36

## 2018-09-13 RX ADMIN — BUPROPION HYDROCHLORIDE 200 MILLIGRAM(S): 150 TABLET, EXTENDED RELEASE ORAL at 14:03

## 2018-09-13 RX ADMIN — Medication 1 TABLET(S): at 13:04

## 2018-09-13 RX ADMIN — OXYCODONE HYDROCHLORIDE 20 MILLIGRAM(S): 5 TABLET ORAL at 21:12

## 2018-09-13 RX ADMIN — HYDROMORPHONE HYDROCHLORIDE 4 MILLIGRAM(S): 2 INJECTION INTRAMUSCULAR; INTRAVENOUS; SUBCUTANEOUS at 11:46

## 2018-09-13 RX ADMIN — SENNA PLUS 2 TABLET(S): 8.6 TABLET ORAL at 21:12

## 2018-09-13 NOTE — PROGRESS NOTE ADULT - ASSESSMENT
Pt is a 66yo F admitted to acute rehab for revision of right TKR    *right TKR revision  Continue PT/OT and acute rehab  Need better pain management   Fall precaution  DVT ppx     *insomnia  will start melatonin tonight   Cont Trazodone    *Depression and anxiety   Continue trazodone    Increase Wellbutrin to home dose     *elevated LFT  f/u acute hepatitis panel  if negative, consider liver ultrasound   continue monitor     *Prophylactic measure  DVT ppx   GI ppx with protonix

## 2018-09-13 NOTE — PROGRESS NOTE ADULT - ASSESSMENT
68 yo f patient s/p  right knee arthroplasty in 2014 with fibrosis of her knee replacement s/p revision on 9/6 w functional, gait and adl impairment    ASSESSMENT AND PLAN:    67 year old F s/p right knee arthroplasty in 2014, with fibrosis of her knee replacement, s/p revision on 9/6, with functional, gait, ADL impairments.    #S/p Right TKR revision  -Rehab: Continue comprehensive PT/OT for 3 hours daily. Rehab goals: Stair negotiation, ambulation at Mod I level within 7-10 days.   -Weight bearing: WBAT RLE  -Pain control: Tylenol prn, ice prn, oxycontin 10 mg BID-change to 10mg qam and 20mg qhs, changed oxycodone to dilaudid 2 or 4 mg prn, lidoderm patch, increase Celebrex from 100mg q12 to 200mg q12 due to moderately severe continual pain  -DVT ppx: Lovenox 40mg daily for 2 weeks (until 9/20) followed by Aspirin 325mg twice daily for 4 weeks.  Add Protonix for GI ppx  -Remove Staples Post-Op Day 21; Remove Dressing Post-Op Day 10  -Cont CPM    #Neuro  -Improved.  Patient with attention/concentration, working memory deficits, may be secondary to narcotic use, will monitor     #Psych, history of anxiety, depression  -On home trazodone  -Takes Wellbutrin at home (per outpatient Pharmacy takes Wellbutrin  mg BID), restarted at a lower dose-resume home dose today    #GI  -Bowel regimen:  +Constipation.  Change Senna prn to qhs.  Cont colace, bisacodyl suppository prn and add dulc po q12 hrs prn and once today  -Diet: Regular   -Nutrition: Vit C, MVI, zinc   -Transaminitis: mild elevation in Alk phos, ALT, monitor

## 2018-09-13 NOTE — PROGRESS NOTE ADULT - SUBJECTIVE AND OBJECTIVE BOX
HPI  Pt is a 66yo F admitted to acute rehab for revision of right TKR  Pt was seen and examined at bedside. Pt states she still have sever shooting pain at right knee and not able to flex knee much due to pain. Pain medication only help minimally. Pt also complaints that she has problem sleeping at night.     Vital Signs Last 24 Hrs  T(C): 36.8 (13 Sep 2018 09:16), Max: 36.8 (13 Sep 2018 09:16)  T(F): 98.3 (13 Sep 2018 09:16), Max: 98.3 (13 Sep 2018 09:16)  HR: 67 (13 Sep 2018 09:16) (67 - 71)  BP: 102/67 (13 Sep 2018 09:16) (102/67 - 119/70)  BP(mean): --  RR: 14 (13 Sep 2018 09:16) (14 - 14)  SpO2: 94% (13 Sep 2018 09:16) (94% - 96%)      PHYSICAL EXAM:    Constitutional: NAD, awake   HEENT: PERR, EOMI,   Neck: Soft and supple,   Respiratory: Breath sounds are clear bilaterally,   Cardiovascular: S1 and S2, regular rate and rhythm,   Gastrointestinal: Bowel Sounds present, soft  Extremities: +2 edema right lower ext.   Neurological: A/O x 3,   Skin: right knee dressed. mild warm on palpation with minimal swelling. decreased flexion     MEDICATIONS:  MEDICATIONS  (STANDING):  ascorbic acid 500 milliGRAM(s) Oral two times a day  buPROPion  milliGRAM(s) Oral two times a day  calcium carbonate 1250 mG  + Vitamin D (OsCal 500 + D) 1 Tablet(s) Oral three times a day  celecoxib 200 milliGRAM(s) Oral every 12 hours  docusate sodium 100 milliGRAM(s) Oral three times a day  enoxaparin Injectable 40 milliGRAM(s) SubCutaneous daily  gabapentin 100 milliGRAM(s) Oral at bedtime  lidocaine   Patch 2 Patch Transdermal daily  multivitamin 1 Tablet(s) Oral daily  oxyCODONE  ER Tablet 10 milliGRAM(s) Oral <User Schedule>  oxyCODONE  ER Tablet 20 milliGRAM(s) Oral <User Schedule>  pantoprazole    Tablet 40 milliGRAM(s) Oral before breakfast  senna 2 Tablet(s) Oral at bedtime  traZODone 50 milliGRAM(s) Oral at bedtime  zinc sulfate 220 milliGRAM(s) Oral daily      LABS: All Labs Reviewed:                Blood Culture:     RADIOLOGY/EKG:    DVT PPX:    ADVANCED DIRECTIVE:    DISPOSITION:

## 2018-09-13 NOTE — PROGRESS NOTE ADULT - SUBJECTIVE AND OBJECTIVE BOX
Patient is a 67y old  Female who presents with a chief complaint of Revision of right TKR with severe pain. (11 Sep 2018 12:31)      Patient seen and examined at bedside.  Pt c/o severe continuous pain right knee.   Pt c/o difficulty sleeping due to the pain.   In addition, pt c/o constipation with no BM in last 3 days.  Denies dysuria, frequency or urgency, SOB or dyspnea.      ALLERGIES:  dust (Eye Irritation)  No Known Drug Allergies    MEDICATIONS  (STANDING):  ascorbic acid 500 milliGRAM(s) Oral two times a day  buPROPion  milliGRAM(s) Oral two times a day  calcium carbonate 1250 mG  + Vitamin D (OsCal 500 + D) 1 Tablet(s) Oral three times a day  celecoxib 200 milliGRAM(s) Oral every 12 hours  docusate sodium 100 milliGRAM(s) Oral three times a day  enoxaparin Injectable 40 milliGRAM(s) SubCutaneous daily  gabapentin 100 milliGRAM(s) Oral at bedtime  lidocaine   Patch 2 Patch Transdermal daily  multivitamin 1 Tablet(s) Oral daily  oxyCODONE  ER Tablet 10 milliGRAM(s) Oral <User Schedule>  oxyCODONE  ER Tablet 20 milliGRAM(s) Oral <User Schedule>  senna 2 Tablet(s) Oral at bedtime  traZODone 50 milliGRAM(s) Oral at bedtime  zinc sulfate 220 milliGRAM(s) Oral daily    MEDICATIONS  (PRN):  acetaminophen   Tablet .. 650 milliGRAM(s) Oral every 6 hours PRN Temp greater or equal to 38C (100.4F), Mild Pain (1 - 3)  bisacodyl Suppository 10 milliGRAM(s) Rectal daily PRN Constipation  HYDROmorphone   Tablet 2 milliGRAM(s) Oral every 6 hours PRN Moderate Pain (4 - 6)  HYDROmorphone   Tablet 4 milliGRAM(s) Oral every 6 hours PRN Severe Pain (7 - 10)  polyethylene glycol 3350 17 Gram(s) Oral daily PRN Constipation    ICU Vital Signs Last 24 Hrs  T(C): 36.8 (13 Sep 2018 09:16), Max: 36.8 (13 Sep 2018 09:16)  T(F): 98.3 (13 Sep 2018 09:16), Max: 98.3 (13 Sep 2018 09:16)  HR: 67 (13 Sep 2018 09:16) (67 - 71)  BP: 102/67 (13 Sep 2018 09:16) (102/67 - 119/70)  BP(mean): --  ABP: --  ABP(mean): --  RR: 14 (13 Sep 2018 09:16) (14 - 14)  SpO2: 94% (13 Sep 2018 09:16) (94% - 96%)          PHYSICAL EXAM:  General: NAD, A/O x 3  ENT: MMM  Neck: Supple, No JVD  Lungs: Clear to auscultation bilaterally  Cardio: RRR, S1/S2, No murmurs  Abdomen: Soft, Nontender, Nondistended; Bowel sounds present  Extremities:  Right knee mild swelling with aquacel dressing in place.  Decreased ROM    Functional status  Transfers CS  Gait Amb ' CG  Stairs 12 steps 2 rails CG  ADLS CG/Min A    LABS:                        11.2   10.7  )-----------( 277      ( 11 Sep 2018 05:20 )             32.2     09-11    139  |  103  |  14  ----------------------------<  115  4.0   |  29  |  0.75    Ca    9.3      11 Sep 2018 05:20    TPro  6.2  /  Alb  2.7  /  TBili  0.9  /  DBili  x   /  AST  25  /  ALT  61  /  AlkPhos  124  09-11    eGFR if Non African American: 82 mL/min/1.73M2 (09-11-18 @ 05:20)  eGFR if : 96 mL/min/1.73M2 (09-11-18 @ 05:20)                    CAPILLARY BLOOD GLUCOSE        08-23 [id number] 5.3          RADIOLOGY & ADDITIONAL TESTS:    Care Discussed with Consultants/Other Providers:

## 2018-09-14 LAB
HAV IGM SER-ACNC: SIGNIFICANT CHANGE UP
HBV CORE IGM SER-ACNC: SIGNIFICANT CHANGE UP
HBV SURFACE AG SER-ACNC: SIGNIFICANT CHANGE UP
HCV AB S/CO SERPL IA: 0.03 S/CO — SIGNIFICANT CHANGE UP
HCV AB SERPL-IMP: SIGNIFICANT CHANGE UP

## 2018-09-14 PROCEDURE — 99232 SBSQ HOSP IP/OBS MODERATE 35: CPT

## 2018-09-14 RX ORDER — ASPIRIN/CALCIUM CARB/MAGNESIUM 324 MG
325 TABLET ORAL
Qty: 0 | Refills: 0 | Status: DISCONTINUED | OUTPATIENT
Start: 2018-09-21 | End: 2018-09-20

## 2018-09-14 RX ORDER — ENOXAPARIN SODIUM 100 MG/ML
40 INJECTION SUBCUTANEOUS DAILY
Qty: 0 | Refills: 0 | Status: COMPLETED | OUTPATIENT
Start: 2018-09-14 | End: 2018-09-20

## 2018-09-14 RX ORDER — HYDROMORPHONE HYDROCHLORIDE 2 MG/ML
4 INJECTION INTRAMUSCULAR; INTRAVENOUS; SUBCUTANEOUS
Qty: 0 | Refills: 0 | Status: DISCONTINUED | OUTPATIENT
Start: 2018-09-14 | End: 2018-09-20

## 2018-09-14 RX ORDER — OXYCODONE HYDROCHLORIDE 5 MG/1
10 TABLET ORAL ONCE
Qty: 0 | Refills: 0 | Status: DISCONTINUED | OUTPATIENT
Start: 2018-09-14 | End: 2018-09-14

## 2018-09-14 RX ORDER — OXYCODONE HYDROCHLORIDE 5 MG/1
20 TABLET ORAL EVERY 12 HOURS
Qty: 0 | Refills: 0 | Status: DISCONTINUED | OUTPATIENT
Start: 2018-09-14 | End: 2018-09-20

## 2018-09-14 RX ADMIN — OXYCODONE HYDROCHLORIDE 10 MILLIGRAM(S): 5 TABLET ORAL at 13:42

## 2018-09-14 RX ADMIN — GABAPENTIN 100 MILLIGRAM(S): 400 CAPSULE ORAL at 22:27

## 2018-09-14 RX ADMIN — Medication 1 TABLET(S): at 22:26

## 2018-09-14 RX ADMIN — Medication 500 MILLIGRAM(S): at 17:43

## 2018-09-14 RX ADMIN — OXYCODONE HYDROCHLORIDE 10 MILLIGRAM(S): 5 TABLET ORAL at 07:10

## 2018-09-14 RX ADMIN — Medication 100 MILLIGRAM(S): at 22:27

## 2018-09-14 RX ADMIN — Medication 500 MILLIGRAM(S): at 06:34

## 2018-09-14 RX ADMIN — Medication 100 MILLIGRAM(S): at 13:03

## 2018-09-14 RX ADMIN — HYDROMORPHONE HYDROCHLORIDE 4 MILLIGRAM(S): 2 INJECTION INTRAMUSCULAR; INTRAVENOUS; SUBCUTANEOUS at 23:58

## 2018-09-14 RX ADMIN — Medication 6 MILLIGRAM(S): at 22:27

## 2018-09-14 RX ADMIN — SENNA PLUS 2 TABLET(S): 8.6 TABLET ORAL at 22:27

## 2018-09-14 RX ADMIN — OXYCODONE HYDROCHLORIDE 10 MILLIGRAM(S): 5 TABLET ORAL at 12:54

## 2018-09-14 RX ADMIN — PANTOPRAZOLE SODIUM 40 MILLIGRAM(S): 20 TABLET, DELAYED RELEASE ORAL at 06:35

## 2018-09-14 RX ADMIN — OXYCODONE HYDROCHLORIDE 10 MILLIGRAM(S): 5 TABLET ORAL at 07:20

## 2018-09-14 RX ADMIN — Medication 1 TABLET(S): at 13:03

## 2018-09-14 RX ADMIN — CELECOXIB 200 MILLIGRAM(S): 200 CAPSULE ORAL at 06:34

## 2018-09-14 RX ADMIN — ENOXAPARIN SODIUM 40 MILLIGRAM(S): 100 INJECTION SUBCUTANEOUS at 12:43

## 2018-09-14 RX ADMIN — CELECOXIB 200 MILLIGRAM(S): 200 CAPSULE ORAL at 17:43

## 2018-09-14 RX ADMIN — Medication 100 MILLIGRAM(S): at 06:35

## 2018-09-14 RX ADMIN — LIDOCAINE 2 PATCH: 4 CREAM TOPICAL at 01:02

## 2018-09-14 RX ADMIN — CELECOXIB 200 MILLIGRAM(S): 200 CAPSULE ORAL at 18:39

## 2018-09-14 RX ADMIN — LIDOCAINE 2 PATCH: 4 CREAM TOPICAL at 12:43

## 2018-09-14 RX ADMIN — ZINC SULFATE TAB 220 MG (50 MG ZINC EQUIVALENT) 220 MILLIGRAM(S): 220 (50 ZN) TAB at 12:55

## 2018-09-14 RX ADMIN — BUPROPION HYDROCHLORIDE 200 MILLIGRAM(S): 150 TABLET, EXTENDED RELEASE ORAL at 08:55

## 2018-09-14 RX ADMIN — HYDROMORPHONE HYDROCHLORIDE 4 MILLIGRAM(S): 2 INJECTION INTRAMUSCULAR; INTRAVENOUS; SUBCUTANEOUS at 10:00

## 2018-09-14 RX ADMIN — BUPROPION HYDROCHLORIDE 200 MILLIGRAM(S): 150 TABLET, EXTENDED RELEASE ORAL at 13:03

## 2018-09-14 RX ADMIN — HYDROMORPHONE HYDROCHLORIDE 4 MILLIGRAM(S): 2 INJECTION INTRAMUSCULAR; INTRAVENOUS; SUBCUTANEOUS at 22:26

## 2018-09-14 RX ADMIN — Medication 1 TABLET(S): at 06:35

## 2018-09-14 RX ADMIN — Medication 50 MILLIGRAM(S): at 22:27

## 2018-09-14 RX ADMIN — Medication 1 TABLET(S): at 12:43

## 2018-09-14 RX ADMIN — HYDROMORPHONE HYDROCHLORIDE 4 MILLIGRAM(S): 2 INJECTION INTRAMUSCULAR; INTRAVENOUS; SUBCUTANEOUS at 11:03

## 2018-09-14 RX ADMIN — OXYCODONE HYDROCHLORIDE 20 MILLIGRAM(S): 5 TABLET ORAL at 17:43

## 2018-09-14 RX ADMIN — CELECOXIB 200 MILLIGRAM(S): 200 CAPSULE ORAL at 07:05

## 2018-09-14 NOTE — PROGRESS NOTE ADULT - ASSESSMENT
66 yo f patient s/p  right knee arthroplasty in 2014 with fibrosis of her knee replacement s/p revision on 9/6 w functional, gait and adl impairment    ASSESSMENT AND PLAN:    67 year old F s/p right knee arthroplasty in 2014, with fibrosis of her knee replacement, s/p revision on 9/6, with functional, gait, ADL impairments.    #S/p Right TKR revision  -Rehab: Continue comprehensive PT/OT for 3 hours daily. Rehab goals: Stair negotiation, ambulation at Mod I level within 7-10 days.   -Weight bearing: WBAT RLE  -Pain control: Tylenol prn, ice prn, increase oxycontin 10 mg qam and 20mg qhs to 20mg q12hrs, changed oxycodone to dilaudid 2 or 4 mg prn, lidoderm patch, increased Celebrex from 100mg q12 to 200mg q12 due to moderately severe continual pain  -DVT ppx: Lovenox 40mg daily for 2 weeks (until 9/20) followed by Aspirin 325mg twice daily for 4 weeks.  Added Protonix for GI ppx  -Remove Staples Post-Op Day 21; Remove Dressing Post-Op Day 10  -Cont CPM    #Neuro  -Patient with attention/concentration, working memory deficits, may be secondary to narcotic use, will monitor.  Resolved    #Psych, history of anxiety, depression  -On home trazodone  -Takes Wellbutrin at home (per outpatient Pharmacy takes Wellbutrin  mg BID), restarted at a lower dose-resume home dose 9/13    #GI  -Bowel regimen:  Constipation; improved with change of Senna from prn to qhs and dulcolax po given.  Cont colace, senna, bisacodyl suppository prn or dulc po q12 hrs prn   -Diet: Regular   -Nutrition: Vit C, MVI, zinc   -Transaminitis: mild elevation in Alk phos, ALT, monitor

## 2018-09-14 NOTE — PROGRESS NOTE ADULT - SUBJECTIVE AND OBJECTIVE BOX
Patient is a 67y old  Female who presents with a chief complaint of Revision of right TKR with severe pain. (11 Sep 2018 12:31)      Patient seen and examined at bedside with her  present.  Pt reports that she slept better last night which she attributes to the increased dose of oxycontin at hs.  At the present time, pt c/o moderately severe pain right knee since her rehab session this am.   +BM yesterday.   Denies dysuria, frequency or urgency, SOB or dyspnea.      ALLERGIES:  dust (Eye Irritation)  No Known Drug Allergies    MEDICATIONS  (STANDING):  ascorbic acid 500 milliGRAM(s) Oral two times a day  buPROPion  milliGRAM(s) Oral two times a day  calcium carbonate 1250 mG  + Vitamin D (OsCal 500 + D) 1 Tablet(s) Oral three times a day  celecoxib 200 milliGRAM(s) Oral every 12 hours  docusate sodium 100 milliGRAM(s) Oral three times a day  enoxaparin Injectable 40 milliGRAM(s) SubCutaneous daily  gabapentin 100 milliGRAM(s) Oral at bedtime  HYDROmorphone   Tablet 4 milliGRAM(s) Oral <User Schedule>  lidocaine   Patch 2 Patch Transdermal daily  melatonin 6 milliGRAM(s) Oral at bedtime  multivitamin 1 Tablet(s) Oral daily  oxyCODONE  ER Tablet 10 milliGRAM(s) Oral once  oxyCODONE  ER Tablet 20 milliGRAM(s) Oral every 12 hours  pantoprazole    Tablet 40 milliGRAM(s) Oral before breakfast  senna 2 Tablet(s) Oral at bedtime  traZODone 50 milliGRAM(s) Oral at bedtime  zinc sulfate 220 milliGRAM(s) Oral daily    MEDICATIONS  (PRN):  acetaminophen   Tablet .. 650 milliGRAM(s) Oral every 6 hours PRN Temp greater or equal to 38C (100.4F), Mild Pain (1 - 3)  bisacodyl 5 milliGRAM(s) Oral every 12 hours PRN Constipation  bisacodyl Suppository 10 milliGRAM(s) Rectal daily PRN Constipation  HYDROmorphone   Tablet 2 milliGRAM(s) Oral every 6 hours PRN Moderate Pain (4 - 6)  HYDROmorphone   Tablet 4 milliGRAM(s) Oral every 4 hours PRN Severe Pain (7 - 10)  polyethylene glycol 3350 17 Gram(s) Oral daily PRN Constipation      ICU Vital Signs Last 24 Hrs  T(C): 36.9 (14 Sep 2018 09:06), Max: 37.3 (13 Sep 2018 21:06)  T(F): 98.5 (14 Sep 2018 09:06), Max: 99.1 (13 Sep 2018 21:06)  HR: 67 (14 Sep 2018 09:06) (67 - 83)  BP: 105/60 (14 Sep 2018 09:06) (105/60 - 107/57)  BP(mean): --  ABP: --  ABP(mean): --  RR: 14 (14 Sep 2018 09:06) (14 - 14)  SpO2: 95% (14 Sep 2018 09:06) (95% - 99%)            PHYSICAL EXAM:  General: NAD, A/O x 3  ENT: MMM  Neck: Supple, No JVD  Lungs: Clear to auscultation bilaterally  Cardio: RRR, S1/S2, No murmurs  Abdomen: Soft, Nontender, Nondistended; Bowel sounds present  Extremities:  Right knee mild swelling with aquacel dressing in place; several dried areas of staining noted.  Decreased ROM    Functional status  Transfers CS  Gait Amb ' CG  Stairs 12 steps 2 rails CG  ADLS CG/Min A    LABS:                        11.2   10.7  )-----------( 277      ( 11 Sep 2018 05:20 )             32.2     09-11    139  |  103  |  14  ----------------------------<  115  4.0   |  29  |  0.75    Ca    9.3      11 Sep 2018 05:20    TPro  6.2  /  Alb  2.7  /  TBili  0.9  /  DBili  x   /  AST  25  /  ALT  61  /  AlkPhos  124  09-11    eGFR if Non African American: 82 mL/min/1.73M2 (09-11-18 @ 05:20)  eGFR if : 96 mL/min/1.73M2 (09-11-18 @ 05:20)                    CAPILLARY BLOOD GLUCOSE        08-23 [id number] 5.3          RADIOLOGY & ADDITIONAL TESTS:    Care Discussed with Consultants/Other Providers:

## 2018-09-15 RX ADMIN — ZINC SULFATE TAB 220 MG (50 MG ZINC EQUIVALENT) 220 MILLIGRAM(S): 220 (50 ZN) TAB at 12:55

## 2018-09-15 RX ADMIN — Medication 100 MILLIGRAM(S): at 15:06

## 2018-09-15 RX ADMIN — SENNA PLUS 2 TABLET(S): 8.6 TABLET ORAL at 21:10

## 2018-09-15 RX ADMIN — CELECOXIB 200 MILLIGRAM(S): 200 CAPSULE ORAL at 17:30

## 2018-09-15 RX ADMIN — Medication 500 MILLIGRAM(S): at 05:38

## 2018-09-15 RX ADMIN — BUPROPION HYDROCHLORIDE 200 MILLIGRAM(S): 150 TABLET, EXTENDED RELEASE ORAL at 08:35

## 2018-09-15 RX ADMIN — CELECOXIB 200 MILLIGRAM(S): 200 CAPSULE ORAL at 05:37

## 2018-09-15 RX ADMIN — PANTOPRAZOLE SODIUM 40 MILLIGRAM(S): 20 TABLET, DELAYED RELEASE ORAL at 06:44

## 2018-09-15 RX ADMIN — HYDROMORPHONE HYDROCHLORIDE 4 MILLIGRAM(S): 2 INJECTION INTRAMUSCULAR; INTRAVENOUS; SUBCUTANEOUS at 06:44

## 2018-09-15 RX ADMIN — HYDROMORPHONE HYDROCHLORIDE 4 MILLIGRAM(S): 2 INJECTION INTRAMUSCULAR; INTRAVENOUS; SUBCUTANEOUS at 20:04

## 2018-09-15 RX ADMIN — BUPROPION HYDROCHLORIDE 200 MILLIGRAM(S): 150 TABLET, EXTENDED RELEASE ORAL at 15:06

## 2018-09-15 RX ADMIN — CELECOXIB 200 MILLIGRAM(S): 200 CAPSULE ORAL at 08:36

## 2018-09-15 RX ADMIN — HYDROMORPHONE HYDROCHLORIDE 4 MILLIGRAM(S): 2 INJECTION INTRAMUSCULAR; INTRAVENOUS; SUBCUTANEOUS at 15:05

## 2018-09-15 RX ADMIN — Medication 1 TABLET(S): at 15:05

## 2018-09-15 RX ADMIN — Medication 50 MILLIGRAM(S): at 21:10

## 2018-09-15 RX ADMIN — LIDOCAINE 2 PATCH: 4 CREAM TOPICAL at 06:53

## 2018-09-15 RX ADMIN — OXYCODONE HYDROCHLORIDE 20 MILLIGRAM(S): 5 TABLET ORAL at 06:49

## 2018-09-15 RX ADMIN — Medication 1 TABLET(S): at 05:38

## 2018-09-15 RX ADMIN — HYDROMORPHONE HYDROCHLORIDE 4 MILLIGRAM(S): 2 INJECTION INTRAMUSCULAR; INTRAVENOUS; SUBCUTANEOUS at 07:34

## 2018-09-15 RX ADMIN — HYDROMORPHONE HYDROCHLORIDE 4 MILLIGRAM(S): 2 INJECTION INTRAMUSCULAR; INTRAVENOUS; SUBCUTANEOUS at 15:30

## 2018-09-15 RX ADMIN — HYDROMORPHONE HYDROCHLORIDE 4 MILLIGRAM(S): 2 INJECTION INTRAMUSCULAR; INTRAVENOUS; SUBCUTANEOUS at 20:34

## 2018-09-15 RX ADMIN — Medication 500 MILLIGRAM(S): at 17:30

## 2018-09-15 RX ADMIN — LIDOCAINE 2 PATCH: 4 CREAM TOPICAL at 12:55

## 2018-09-15 RX ADMIN — Medication 100 MILLIGRAM(S): at 05:38

## 2018-09-15 RX ADMIN — CELECOXIB 200 MILLIGRAM(S): 200 CAPSULE ORAL at 19:00

## 2018-09-15 RX ADMIN — OXYCODONE HYDROCHLORIDE 20 MILLIGRAM(S): 5 TABLET ORAL at 05:36

## 2018-09-15 RX ADMIN — OXYCODONE HYDROCHLORIDE 20 MILLIGRAM(S): 5 TABLET ORAL at 19:45

## 2018-09-15 RX ADMIN — Medication 6 MILLIGRAM(S): at 21:10

## 2018-09-15 RX ADMIN — POLYETHYLENE GLYCOL 3350 17 GRAM(S): 17 POWDER, FOR SOLUTION ORAL at 12:54

## 2018-09-15 RX ADMIN — Medication 100 MILLIGRAM(S): at 21:10

## 2018-09-15 RX ADMIN — OXYCODONE HYDROCHLORIDE 20 MILLIGRAM(S): 5 TABLET ORAL at 17:29

## 2018-09-15 RX ADMIN — Medication 1 TABLET(S): at 21:10

## 2018-09-15 RX ADMIN — Medication 1 TABLET(S): at 12:54

## 2018-09-15 RX ADMIN — GABAPENTIN 100 MILLIGRAM(S): 400 CAPSULE ORAL at 21:10

## 2018-09-15 RX ADMIN — ENOXAPARIN SODIUM 40 MILLIGRAM(S): 100 INJECTION SUBCUTANEOUS at 12:54

## 2018-09-15 NOTE — PROGRESS NOTE ADULT - SUBJECTIVE AND OBJECTIVE BOX
Patient seen and examined at bedside .  Pain controlled, slept well.     ROS: no chest pain, shortness of breath, nausea/voiting    MEDICATIONS  (STANDING):  ascorbic acid 500 milliGRAM(s) Oral two times a day  buPROPion  milliGRAM(s) Oral two times a day  calcium carbonate 1250 mG  + Vitamin D (OsCal 500 + D) 1 Tablet(s) Oral three times a day  celecoxib 200 milliGRAM(s) Oral every 12 hours  docusate sodium 100 milliGRAM(s) Oral three times a day  enoxaparin Injectable 40 milliGRAM(s) SubCutaneous daily  gabapentin 100 milliGRAM(s) Oral at bedtime  HYDROmorphone   Tablet 4 milliGRAM(s) Oral <User Schedule>  lidocaine   Patch 2 Patch Transdermal daily  melatonin 6 milliGRAM(s) Oral at bedtime  multivitamin 1 Tablet(s) Oral daily  oxyCODONE  ER Tablet 20 milliGRAM(s) Oral every 12 hours  pantoprazole    Tablet 40 milliGRAM(s) Oral before breakfast  senna 2 Tablet(s) Oral at bedtime  traZODone 50 milliGRAM(s) Oral at bedtime  zinc sulfate 220 milliGRAM(s) Oral daily    MEDICATIONS  (PRN):  acetaminophen   Tablet .. 650 milliGRAM(s) Oral every 6 hours PRN Temp greater or equal to 38C (100.4F), Mild Pain (1 - 3)  bisacodyl 5 milliGRAM(s) Oral every 12 hours PRN Constipation  bisacodyl Suppository 10 milliGRAM(s) Rectal daily PRN Constipation  HYDROmorphone   Tablet 2 milliGRAM(s) Oral every 6 hours PRN Moderate Pain (4 - 6)  HYDROmorphone   Tablet 4 milliGRAM(s) Oral every 4 hours PRN Severe Pain (7 - 10)  polyethylene glycol 3350 17 Gram(s) Oral daily PRN Constipation    Vital Signs Last 24 Hrs  T(C): 37.1 (15 Sep 2018 07:56), Max: 37.1 (15 Sep 2018 07:56)  T(F): 98.8 (15 Sep 2018 07:56), Max: 98.8 (15 Sep 2018 07:56)  HR: 70 (15 Sep 2018 07:56) (70 - 73)  BP: 94/57 (15 Sep 2018 07:56) (94/57 - 98/63)  BP(mean): --  RR: 14 (15 Sep 2018 07:56) (14 - 14)  SpO2: 96% (15 Sep 2018 07:56) (96% - 98%)    PHYSICAL EXAM:  General: NAD, A/O x 3  ENT: MMM  Neck: Supple, No JVD  Lungs: Clear to auscultation bilaterally  Cardio: RRR, S1/S2, No murmurs  Abdomen: Soft, Nontender, Nondistended; Bowel sounds present  Extremities:  Right knee mild swelling with aquacel dressing in place; several dried areas of staining noted.  Decreased ROM                  CAPILLARY BLOOD GLUCOSE        08-23 [id number] 5.3          RADIOLOGY & ADDITIONAL TESTS:    Care Discussed with Consultants/Other Providers:

## 2018-09-15 NOTE — DIETITIAN INITIAL EVALUATION ADULT. - PERTINENT MEDS FT
Dilaudid, Oxycontin, Dulcolax, WellbutrinSR, Celebrex, protonix, Melatonin, Desyrel, Zinc Sulfate, Neurontin, Tylenol, Colcae, Calcuim Carbonate/Vit D3.

## 2018-09-16 PROCEDURE — 99232 SBSQ HOSP IP/OBS MODERATE 35: CPT

## 2018-09-16 RX ADMIN — CELECOXIB 200 MILLIGRAM(S): 200 CAPSULE ORAL at 06:22

## 2018-09-16 RX ADMIN — Medication 500 MILLIGRAM(S): at 06:22

## 2018-09-16 RX ADMIN — LIDOCAINE 2 PATCH: 4 CREAM TOPICAL at 00:35

## 2018-09-16 RX ADMIN — Medication 100 MILLIGRAM(S): at 06:22

## 2018-09-16 RX ADMIN — LIDOCAINE 2 PATCH: 4 CREAM TOPICAL at 12:25

## 2018-09-16 RX ADMIN — CELECOXIB 200 MILLIGRAM(S): 200 CAPSULE ORAL at 17:52

## 2018-09-16 RX ADMIN — Medication 100 MILLIGRAM(S): at 14:50

## 2018-09-16 RX ADMIN — BUPROPION HYDROCHLORIDE 200 MILLIGRAM(S): 150 TABLET, EXTENDED RELEASE ORAL at 14:50

## 2018-09-16 RX ADMIN — BUPROPION HYDROCHLORIDE 200 MILLIGRAM(S): 150 TABLET, EXTENDED RELEASE ORAL at 08:14

## 2018-09-16 RX ADMIN — Medication 1 TABLET(S): at 06:23

## 2018-09-16 RX ADMIN — Medication 6 MILLIGRAM(S): at 21:26

## 2018-09-16 RX ADMIN — Medication 1 TABLET(S): at 14:50

## 2018-09-16 RX ADMIN — Medication 500 MILLIGRAM(S): at 18:04

## 2018-09-16 RX ADMIN — OXYCODONE HYDROCHLORIDE 20 MILLIGRAM(S): 5 TABLET ORAL at 06:22

## 2018-09-16 RX ADMIN — GABAPENTIN 100 MILLIGRAM(S): 400 CAPSULE ORAL at 21:25

## 2018-09-16 RX ADMIN — PANTOPRAZOLE SODIUM 40 MILLIGRAM(S): 20 TABLET, DELAYED RELEASE ORAL at 06:22

## 2018-09-16 RX ADMIN — CELECOXIB 200 MILLIGRAM(S): 200 CAPSULE ORAL at 06:52

## 2018-09-16 RX ADMIN — Medication 100 MILLIGRAM(S): at 21:26

## 2018-09-16 RX ADMIN — HYDROMORPHONE HYDROCHLORIDE 4 MILLIGRAM(S): 2 INJECTION INTRAMUSCULAR; INTRAVENOUS; SUBCUTANEOUS at 06:22

## 2018-09-16 RX ADMIN — ZINC SULFATE TAB 220 MG (50 MG ZINC EQUIVALENT) 220 MILLIGRAM(S): 220 (50 ZN) TAB at 12:24

## 2018-09-16 RX ADMIN — CELECOXIB 200 MILLIGRAM(S): 200 CAPSULE ORAL at 18:22

## 2018-09-16 RX ADMIN — OXYCODONE HYDROCHLORIDE 20 MILLIGRAM(S): 5 TABLET ORAL at 17:52

## 2018-09-16 RX ADMIN — HYDROMORPHONE HYDROCHLORIDE 4 MILLIGRAM(S): 2 INJECTION INTRAMUSCULAR; INTRAVENOUS; SUBCUTANEOUS at 06:52

## 2018-09-16 RX ADMIN — OXYCODONE HYDROCHLORIDE 20 MILLIGRAM(S): 5 TABLET ORAL at 18:22

## 2018-09-16 RX ADMIN — Medication 1 TABLET(S): at 21:26

## 2018-09-16 RX ADMIN — Medication 50 MILLIGRAM(S): at 21:25

## 2018-09-16 RX ADMIN — Medication 1 TABLET(S): at 12:24

## 2018-09-16 RX ADMIN — OXYCODONE HYDROCHLORIDE 20 MILLIGRAM(S): 5 TABLET ORAL at 06:52

## 2018-09-16 RX ADMIN — SENNA PLUS 2 TABLET(S): 8.6 TABLET ORAL at 21:25

## 2018-09-16 RX ADMIN — ENOXAPARIN SODIUM 40 MILLIGRAM(S): 100 INJECTION SUBCUTANEOUS at 12:25

## 2018-09-16 NOTE — PROGRESS NOTE ADULT - SUBJECTIVE AND OBJECTIVE BOX
Patient seen and examined at bedside .  Pain controlled, slept well. DOing CPM    ROS: no chest pain, shortness of breath, nausea/voiting  Vital Signs Last 24 Hrs  T(C): 37.1 (16 Sep 2018 10:41), Max: 37.1 (16 Sep 2018 10:41)  T(F): 98.7 (16 Sep 2018 10:41), Max: 98.7 (16 Sep 2018 10:41)  HR: 74 (16 Sep 2018 10:41) (74 - 77)  BP: 96/55 (16 Sep 2018 10:41) (96/55 - 97/54)  BP(mean): --  RR: 16 (16 Sep 2018 10:41) (14 - 16)  SpO2: 95% (16 Sep 2018 10:41) (94% - 95%)    PHYSICAL EXAM:  General: NAD, A/O x 3  ENT: MMM  Neck: Supple, No JVD  Lungs: Clear to auscultation bilaterally  Cardio: RRR, S1/S2, No murmurs  Abdomen: Soft, Nontender, Nondistended; Bowel sounds present  Extremities:  Right knee mild swelling with aquacel dressing in place; several dried areas of staining noted.  Decreased ROM                  CAPILLARY BLOOD GLUCOSE        08-23 [id number] 5.3          RADIOLOGY & ADDITIONAL TESTS:    Care Discussed with Consultants/Other Providers:

## 2018-09-16 NOTE — PROGRESS NOTE ADULT - SUBJECTIVE AND OBJECTIVE BOX
This is a 67 year old F with PMH depression/anxiety, s/p right knee arthroplasty in 2014 s/p right knee arthroplasty revision  on 9/6/18.     Subjective    No new complaints.         PAST MEDICAL & SURGICAL HISTORY:  Anxiety  Depression  S/P ACL repair: right  S/P knee replacement: Right  ( 2014 )  History of Tonsillectomy (ICD9 V45.89)  H/O: Knee Surgery (ICD9 V45.89): several times  H/O: Hysterectomy (ICD9 V88.01)      MedsMEDICATIONS  (STANDING):  ascorbic acid 500 milliGRAM(s) Oral two times a day  buPROPion  milliGRAM(s) Oral two times a day  calcium carbonate 1250 mG  + Vitamin D (OsCal 500 + D) 1 Tablet(s) Oral three times a day  celecoxib 200 milliGRAM(s) Oral every 12 hours  docusate sodium 100 milliGRAM(s) Oral three times a day  enoxaparin Injectable 40 milliGRAM(s) SubCutaneous daily  gabapentin 100 milliGRAM(s) Oral at bedtime  HYDROmorphone   Tablet 4 milliGRAM(s) Oral <User Schedule>  lidocaine   Patch 2 Patch Transdermal daily  melatonin 6 milliGRAM(s) Oral at bedtime  multivitamin 1 Tablet(s) Oral daily  oxyCODONE  ER Tablet 20 milliGRAM(s) Oral every 12 hours  pantoprazole    Tablet 40 milliGRAM(s) Oral before breakfast  senna 2 Tablet(s) Oral at bedtime  traZODone 50 milliGRAM(s) Oral at bedtime  zinc sulfate 220 milliGRAM(s) Oral daily    MEDICATIONS  (PRN):  acetaminophen   Tablet .. 650 milliGRAM(s) Oral every 6 hours PRN Temp greater or equal to 38C (100.4F), Mild Pain (1 - 3)  bisacodyl 5 milliGRAM(s) Oral every 12 hours PRN Constipation  bisacodyl Suppository 10 milliGRAM(s) Rectal daily PRN Constipation  HYDROmorphone   Tablet 2 milliGRAM(s) Oral every 6 hours PRN Moderate Pain (4 - 6)  HYDROmorphone   Tablet 4 milliGRAM(s) Oral every 4 hours PRN Severe Pain (7 - 10)  polyethylene glycol 3350 17 Gram(s) Oral daily PRN Constipation      Vital Signs Last 24 Hrs  T(C): 37.1 (16 Sep 2018 10:41), Max: 37.1 (16 Sep 2018 10:41)  T(F): 98.7 (16 Sep 2018 10:41), Max: 98.7 (16 Sep 2018 10:41)  HR: 74 (16 Sep 2018 10:41) (74 - 77)  BP: 96/55 (16 Sep 2018 10:41) (96/55 - 97/54)  BP(mean): --  RR: 16 (16 Sep 2018 10:41) (14 - 16)  SpO2: 95% (16 Sep 2018 10:41) (94% - 95%)  I&O's Summary      PHYSICAL EXAM:  GENERAL: NAD  NECK: Supple  NERVOUS SYSTEM:  awake and alert  HEART: S1s2 NL , RRR  CHEST/LUNG: Clear to percussion bilaterally  ABDOMEN: Soft, Nontender, Nondistended; Bowel sounds present  EXTREMITIES:  RLE-dressing intact.       LABS:      Imaging Personally Reviewed:  [ ] YES  [ ] NO      HEALTH ISSUES - PROBLEM Dx:  s/p Right TKR revision  PT/OT per haile  Pain control  IS    Mildly elev ALT  monitor for now  repeat CMP ordered for AM  hold off further w/u for now        Care Discussed with Consultants/Other Providers [ x] YES  [ ] NO

## 2018-09-17 LAB
ALBUMIN SERPL ELPH-MCNC: 2.6 G/DL — LOW (ref 3.3–5)
ALP SERPL-CCNC: 119 U/L — SIGNIFICANT CHANGE UP (ref 40–120)
ALT FLD-CCNC: 29 U/L DA — SIGNIFICANT CHANGE UP (ref 10–45)
ANION GAP SERPL CALC-SCNC: 8 MMOL/L — SIGNIFICANT CHANGE UP (ref 5–17)
AST SERPL-CCNC: 16 U/L — SIGNIFICANT CHANGE UP (ref 10–40)
BILIRUB SERPL-MCNC: 0.5 MG/DL — SIGNIFICANT CHANGE UP (ref 0.2–1.2)
BUN SERPL-MCNC: 14 MG/DL — SIGNIFICANT CHANGE UP (ref 7–23)
CALCIUM SERPL-MCNC: 9.3 MG/DL — SIGNIFICANT CHANGE UP (ref 8.4–10.5)
CHLORIDE SERPL-SCNC: 104 MMOL/L — SIGNIFICANT CHANGE UP (ref 96–108)
CO2 SERPL-SCNC: 29 MMOL/L — SIGNIFICANT CHANGE UP (ref 22–31)
CREAT SERPL-MCNC: 0.72 MG/DL — SIGNIFICANT CHANGE UP (ref 0.5–1.3)
GLUCOSE SERPL-MCNC: 102 MG/DL — HIGH (ref 70–99)
HCT VFR BLD CALC: 30.6 % — LOW (ref 34.5–45)
HGB BLD-MCNC: 10.1 G/DL — LOW (ref 11.5–15.5)
MCHC RBC-ENTMCNC: 31.2 PG — SIGNIFICANT CHANGE UP (ref 27–34)
MCHC RBC-ENTMCNC: 33.1 GM/DL — SIGNIFICANT CHANGE UP (ref 32–36)
MCV RBC AUTO: 94.2 FL — SIGNIFICANT CHANGE UP (ref 80–100)
PLATELET # BLD AUTO: 449 K/UL — HIGH (ref 150–400)
POTASSIUM SERPL-MCNC: 3.9 MMOL/L — SIGNIFICANT CHANGE UP (ref 3.5–5.3)
POTASSIUM SERPL-SCNC: 3.9 MMOL/L — SIGNIFICANT CHANGE UP (ref 3.5–5.3)
PROT SERPL-MCNC: 6 G/DL — SIGNIFICANT CHANGE UP (ref 6–8.3)
RBC # BLD: 3.25 M/UL — LOW (ref 3.8–5.2)
RBC # FLD: 11.7 % — SIGNIFICANT CHANGE UP (ref 10.3–14.5)
SODIUM SERPL-SCNC: 141 MMOL/L — SIGNIFICANT CHANGE UP (ref 135–145)
WBC # BLD: 12.4 K/UL — HIGH (ref 3.8–10.5)
WBC # FLD AUTO: 12.4 K/UL — HIGH (ref 3.8–10.5)

## 2018-09-17 PROCEDURE — 99232 SBSQ HOSP IP/OBS MODERATE 35: CPT

## 2018-09-17 RX ADMIN — CELECOXIB 200 MILLIGRAM(S): 200 CAPSULE ORAL at 07:14

## 2018-09-17 RX ADMIN — ENOXAPARIN SODIUM 40 MILLIGRAM(S): 100 INJECTION SUBCUTANEOUS at 12:34

## 2018-09-17 RX ADMIN — CELECOXIB 200 MILLIGRAM(S): 200 CAPSULE ORAL at 17:02

## 2018-09-17 RX ADMIN — Medication 6 MILLIGRAM(S): at 21:08

## 2018-09-17 RX ADMIN — HYDROMORPHONE HYDROCHLORIDE 4 MILLIGRAM(S): 2 INJECTION INTRAMUSCULAR; INTRAVENOUS; SUBCUTANEOUS at 15:01

## 2018-09-17 RX ADMIN — Medication 1 TABLET(S): at 15:02

## 2018-09-17 RX ADMIN — Medication 1 TABLET(S): at 12:33

## 2018-09-17 RX ADMIN — HYDROMORPHONE HYDROCHLORIDE 4 MILLIGRAM(S): 2 INJECTION INTRAMUSCULAR; INTRAVENOUS; SUBCUTANEOUS at 22:30

## 2018-09-17 RX ADMIN — GABAPENTIN 100 MILLIGRAM(S): 400 CAPSULE ORAL at 21:44

## 2018-09-17 RX ADMIN — SENNA PLUS 2 TABLET(S): 8.6 TABLET ORAL at 21:09

## 2018-09-17 RX ADMIN — BUPROPION HYDROCHLORIDE 200 MILLIGRAM(S): 150 TABLET, EXTENDED RELEASE ORAL at 15:02

## 2018-09-17 RX ADMIN — Medication 100 MILLIGRAM(S): at 21:08

## 2018-09-17 RX ADMIN — HYDROMORPHONE HYDROCHLORIDE 4 MILLIGRAM(S): 2 INJECTION INTRAMUSCULAR; INTRAVENOUS; SUBCUTANEOUS at 21:44

## 2018-09-17 RX ADMIN — HYDROMORPHONE HYDROCHLORIDE 4 MILLIGRAM(S): 2 INJECTION INTRAMUSCULAR; INTRAVENOUS; SUBCUTANEOUS at 15:04

## 2018-09-17 RX ADMIN — Medication 100 MILLIGRAM(S): at 07:14

## 2018-09-17 RX ADMIN — Medication 1 TABLET(S): at 07:14

## 2018-09-17 RX ADMIN — BUPROPION HYDROCHLORIDE 200 MILLIGRAM(S): 150 TABLET, EXTENDED RELEASE ORAL at 08:02

## 2018-09-17 RX ADMIN — HYDROMORPHONE HYDROCHLORIDE 4 MILLIGRAM(S): 2 INJECTION INTRAMUSCULAR; INTRAVENOUS; SUBCUTANEOUS at 11:04

## 2018-09-17 RX ADMIN — LIDOCAINE 2 PATCH: 4 CREAM TOPICAL at 23:30

## 2018-09-17 RX ADMIN — OXYCODONE HYDROCHLORIDE 20 MILLIGRAM(S): 5 TABLET ORAL at 17:02

## 2018-09-17 RX ADMIN — PANTOPRAZOLE SODIUM 40 MILLIGRAM(S): 20 TABLET, DELAYED RELEASE ORAL at 07:14

## 2018-09-17 RX ADMIN — ZINC SULFATE TAB 220 MG (50 MG ZINC EQUIVALENT) 220 MILLIGRAM(S): 220 (50 ZN) TAB at 12:33

## 2018-09-17 RX ADMIN — Medication 100 MILLIGRAM(S): at 12:38

## 2018-09-17 RX ADMIN — Medication 50 MILLIGRAM(S): at 21:09

## 2018-09-17 RX ADMIN — LIDOCAINE 2 PATCH: 4 CREAM TOPICAL at 01:41

## 2018-09-17 RX ADMIN — HYDROMORPHONE HYDROCHLORIDE 4 MILLIGRAM(S): 2 INJECTION INTRAMUSCULAR; INTRAVENOUS; SUBCUTANEOUS at 08:03

## 2018-09-17 RX ADMIN — Medication 500 MILLIGRAM(S): at 17:03

## 2018-09-17 RX ADMIN — HYDROMORPHONE HYDROCHLORIDE 4 MILLIGRAM(S): 2 INJECTION INTRAMUSCULAR; INTRAVENOUS; SUBCUTANEOUS at 11:13

## 2018-09-17 RX ADMIN — OXYCODONE HYDROCHLORIDE 20 MILLIGRAM(S): 5 TABLET ORAL at 07:14

## 2018-09-17 RX ADMIN — Medication 1 TABLET(S): at 21:07

## 2018-09-17 RX ADMIN — LIDOCAINE 2 PATCH: 4 CREAM TOPICAL at 12:32

## 2018-09-17 RX ADMIN — Medication 500 MILLIGRAM(S): at 07:14

## 2018-09-17 NOTE — PROGRESS NOTE ADULT - SUBJECTIVE AND OBJECTIVE BOX
Patient seen and examined at bedside .  Pain controlled; much improved.  +BM yesterday.  Slept well.  Tolerating CPM; requesting a Rx for home.      ROS:  Negative;  no chest pain, dysuria, shortness of breath, nausea/vomiting    Vital Signs Last 24 Hrs  T(C): 36.7 (17 Sep 2018 07:58), Max: 37.2 (16 Sep 2018 21:30)  T(F): 98 (17 Sep 2018 07:58), Max: 98.9 (16 Sep 2018 21:30)  HR: 65 (17 Sep 2018 07:58) (65 - 94)  BP: 113/63 (17 Sep 2018 07:58) (111/61 - 113/63)  BP(mean): --  RR: 14 (17 Sep 2018 07:58) (14 - 14)  SpO2: 96% (17 Sep 2018 07:58) (94% - 96%)    PHYSICAL EXAM:  General: NAD, A/O x 3  ENT: MMM  Neck: Supple, No JVD  Lungs: Clear to auscultation bilaterally  Cardio: RRR, S1/S2, No murmurs  Abdomen: Soft, Nontender, Nondistended; Bowel sounds present  Extremities:  Right knee mild swelling with aquacel dressing in place; several dried areas of staining noted-aquacel replaced.  Incision with staples CDI.  No erythema noted.  Decreased ROM    Functional status  Transfers supervision  Gait Amb 60' SAC CG  Stairs Negotiated 16steps 2 rails sup  ADLs Supervision                          10.1   12.4  )-----------( 449      ( 17 Sep 2018 05:35 )             30.6   09-17    141  |  104  |  14  ----------------------------<  102<H>  3.9   |  29  |  0.72    Ca    9.3      17 Sep 2018 05:35    TPro  6.0  /  Alb  2.6<L>  /  TBili  0.5  /  DBili  x   /  AST  16  /  ALT  29  /  AlkPhos  119  09-17      08-23 [id number] 5.3

## 2018-09-17 NOTE — PROGRESS NOTE ADULT - ASSESSMENT
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    #S/p Right TKR revision  -Rehab: Continue comprehensive PT/OT for 3 hours daily. Rehab goals: Stair negotiation, ambulation at Mod I level within 7-10 days.   -Weight bearing: WBAT RLE  -Pain control: Tylenol prn, ice prn, increase oxycontin 10 mg qam and 20mg qhs to 20mg q12hrs, changed oxycodone to dilaudid 2 or 4 mg prn, lidoderm patch, increased Celebrex from 100mg q12 to 200mg q12 due to moderately severe continual pain with much improvement  -DVT ppx: Lovenox 40mg daily for 2 weeks (until 9/20) followed by Aspirin 325mg twice daily for 4 weeks.  Added Protonix for GI ppx  -Remove Staples Post-Op Day 21; Remove Dressing Post-Op Day 10; pt requested a new Aquacel  -CPM initiated 9/11/18 due to severe decrease in ROM -18degrees ext to 37degrees flex.  Cont CPM; recommend it for home use     #Neuro  -Patient with attention/concentration, working memory deficits, may be secondary to narcotic use, will monitor.  Resolved    #Psych, history of anxiety, depression  -On home trazodone  -Takes Wellbutrin at home (per outpatient Pharmacy takes Wellbutrin  mg BID), restarted at a lower dose-resume home dose 9/13    #GI  -Bowel regimen:  Constipation; improved with change of Senna from prn to qhs and dulcolax po given.  Cont colace, senna, bisacodyl suppository prn or dulc po q12 hrs prn   -Diet: Regular   -Nutrition: Vit C, MVI, zinc   -Transaminitis: mild elevation in Alk phos, ALT now resolved.

## 2018-09-18 PROCEDURE — 99232 SBSQ HOSP IP/OBS MODERATE 35: CPT

## 2018-09-18 RX ORDER — HYDROMORPHONE HYDROCHLORIDE 2 MG/ML
2 INJECTION INTRAMUSCULAR; INTRAVENOUS; SUBCUTANEOUS ONCE
Qty: 0 | Refills: 0 | Status: DISCONTINUED | OUTPATIENT
Start: 2018-09-18 | End: 2018-09-18

## 2018-09-18 RX ORDER — HYDROMORPHONE HYDROCHLORIDE 2 MG/ML
2 INJECTION INTRAMUSCULAR; INTRAVENOUS; SUBCUTANEOUS EVERY 6 HOURS
Qty: 0 | Refills: 0 | Status: DISCONTINUED | OUTPATIENT
Start: 2018-09-18 | End: 2018-09-20

## 2018-09-18 RX ORDER — BENZOCAINE AND MENTHOL 5; 1 G/100ML; G/100ML
1 LIQUID ORAL
Qty: 0 | Refills: 0 | Status: DISCONTINUED | OUTPATIENT
Start: 2018-09-18 | End: 2018-09-19

## 2018-09-18 RX ADMIN — Medication 50 MILLIGRAM(S): at 19:31

## 2018-09-18 RX ADMIN — BUPROPION HYDROCHLORIDE 200 MILLIGRAM(S): 150 TABLET, EXTENDED RELEASE ORAL at 08:38

## 2018-09-18 RX ADMIN — CELECOXIB 200 MILLIGRAM(S): 200 CAPSULE ORAL at 07:30

## 2018-09-18 RX ADMIN — OXYCODONE HYDROCHLORIDE 20 MILLIGRAM(S): 5 TABLET ORAL at 06:49

## 2018-09-18 RX ADMIN — Medication 100 MILLIGRAM(S): at 06:49

## 2018-09-18 RX ADMIN — HYDROMORPHONE HYDROCHLORIDE 4 MILLIGRAM(S): 2 INJECTION INTRAMUSCULAR; INTRAVENOUS; SUBCUTANEOUS at 08:35

## 2018-09-18 RX ADMIN — HYDROMORPHONE HYDROCHLORIDE 4 MILLIGRAM(S): 2 INJECTION INTRAMUSCULAR; INTRAVENOUS; SUBCUTANEOUS at 02:41

## 2018-09-18 RX ADMIN — CELECOXIB 200 MILLIGRAM(S): 200 CAPSULE ORAL at 06:48

## 2018-09-18 RX ADMIN — LIDOCAINE 2 PATCH: 4 CREAM TOPICAL at 12:08

## 2018-09-18 RX ADMIN — PANTOPRAZOLE SODIUM 40 MILLIGRAM(S): 20 TABLET, DELAYED RELEASE ORAL at 06:49

## 2018-09-18 RX ADMIN — Medication 100 MILLIGRAM(S): at 12:07

## 2018-09-18 RX ADMIN — GABAPENTIN 100 MILLIGRAM(S): 400 CAPSULE ORAL at 19:31

## 2018-09-18 RX ADMIN — Medication 1 TABLET(S): at 12:07

## 2018-09-18 RX ADMIN — Medication 1 TABLET(S): at 12:05

## 2018-09-18 RX ADMIN — Medication 500 MILLIGRAM(S): at 17:17

## 2018-09-18 RX ADMIN — Medication 6 MILLIGRAM(S): at 19:30

## 2018-09-18 RX ADMIN — HYDROMORPHONE HYDROCHLORIDE 4 MILLIGRAM(S): 2 INJECTION INTRAMUSCULAR; INTRAVENOUS; SUBCUTANEOUS at 19:31

## 2018-09-18 RX ADMIN — OXYCODONE HYDROCHLORIDE 20 MILLIGRAM(S): 5 TABLET ORAL at 17:16

## 2018-09-18 RX ADMIN — HYDROMORPHONE HYDROCHLORIDE 4 MILLIGRAM(S): 2 INJECTION INTRAMUSCULAR; INTRAVENOUS; SUBCUTANEOUS at 03:30

## 2018-09-18 RX ADMIN — HYDROMORPHONE HYDROCHLORIDE 4 MILLIGRAM(S): 2 INJECTION INTRAMUSCULAR; INTRAVENOUS; SUBCUTANEOUS at 12:30

## 2018-09-18 RX ADMIN — HYDROMORPHONE HYDROCHLORIDE 2 MILLIGRAM(S): 2 INJECTION INTRAMUSCULAR; INTRAVENOUS; SUBCUTANEOUS at 10:28

## 2018-09-18 RX ADMIN — ENOXAPARIN SODIUM 40 MILLIGRAM(S): 100 INJECTION SUBCUTANEOUS at 12:03

## 2018-09-18 RX ADMIN — OXYCODONE HYDROCHLORIDE 20 MILLIGRAM(S): 5 TABLET ORAL at 17:50

## 2018-09-18 RX ADMIN — HYDROMORPHONE HYDROCHLORIDE 4 MILLIGRAM(S): 2 INJECTION INTRAMUSCULAR; INTRAVENOUS; SUBCUTANEOUS at 08:55

## 2018-09-18 RX ADMIN — Medication 1 TABLET(S): at 19:31

## 2018-09-18 RX ADMIN — CELECOXIB 200 MILLIGRAM(S): 200 CAPSULE ORAL at 17:18

## 2018-09-18 RX ADMIN — Medication 650 MILLIGRAM(S): at 19:32

## 2018-09-18 RX ADMIN — OXYCODONE HYDROCHLORIDE 20 MILLIGRAM(S): 5 TABLET ORAL at 07:30

## 2018-09-18 RX ADMIN — ZINC SULFATE TAB 220 MG (50 MG ZINC EQUIVALENT) 220 MILLIGRAM(S): 220 (50 ZN) TAB at 12:05

## 2018-09-18 RX ADMIN — CELECOXIB 200 MILLIGRAM(S): 200 CAPSULE ORAL at 17:50

## 2018-09-18 RX ADMIN — Medication 1 TABLET(S): at 06:48

## 2018-09-18 RX ADMIN — HYDROMORPHONE HYDROCHLORIDE 2 MILLIGRAM(S): 2 INJECTION INTRAMUSCULAR; INTRAVENOUS; SUBCUTANEOUS at 10:40

## 2018-09-18 RX ADMIN — Medication 500 MILLIGRAM(S): at 06:48

## 2018-09-18 RX ADMIN — BUPROPION HYDROCHLORIDE 200 MILLIGRAM(S): 150 TABLET, EXTENDED RELEASE ORAL at 12:06

## 2018-09-18 RX ADMIN — HYDROMORPHONE HYDROCHLORIDE 4 MILLIGRAM(S): 2 INJECTION INTRAMUSCULAR; INTRAVENOUS; SUBCUTANEOUS at 12:14

## 2018-09-18 NOTE — PROGRESS NOTE ADULT - SUBJECTIVE AND OBJECTIVE BOX
HPI  Ms. Jones is a 67 year old F with PMH depression/anxiety, s/p right knee arthroplasty in 2014 (initially had a fall in a school parking lot in 2005 resulting in a "broken ankle" and multiple right knee surgeries including ACL repair, meniscal repair). Per documentation she had poor post operative therapy after her TKA in 2014. She then required arthroscopic surgery in 2015. She has had several subsequent falls, the last being in May/June 2018. Workup showed right knee fibrosis, thus she was admitted to [facility] for elective revision of her right knee arthroplasty on 9/6/18 by Dr. Chavez. No immediate postoperative complications. Underwent nerve block by anesthesia on 9/7 for severe pain. On 9/7/18 noted to have leukocytosis (WBC 18). Cultures from right knee/synovial fluid as well as urine culture all negative. WBC improved to 11 by day of discharge on 9/10/18. Patient also with low grade temperature (99F -100F) last night and this AM. Otherwise, medically optimized for discharge to acute rehab.     Weight bearing: WBAT RLE  DVT ppx: Lovenox 40mg daily for 2 weeks followed by Aspirin 325mg twice daily for 4 weeks  Remove Staples Post-Op Day 21; and Remove Dressing Post-Op Day 10    SUBJECTIVE:  Seen in PT gym. Pain better controlled overall. Improved right knee flexion to ~70 degrees, almost full extension. + BMs. Would like to stay until the end of the week for increased therapy time if possible.     ROS:  [X] Constitutional WNL         [X] Cardio            [X] Resp WNL           [X  ] GI WNL                          [x  ]  WNL                   [X] Heme WNL              [X] Endo WNL                     [X] Skin WNL                 [  ] MSK WNL            [X] Neuro WNL                   [ X ] Cognitive WNL        [X] Psych WNL    Vital Signs Last 24 Hrs  T(C): 36.6 (18 Sep 2018 08:15), Max: 36.7 (17 Sep 2018 21:22)  T(F): 97.8 (18 Sep 2018 08:15), Max: 98.1 (17 Sep 2018 21:22)  HR: 75 (18 Sep 2018 08:15) (70 - 75)  BP: 116/66 (18 Sep 2018 08:15) (114/66 - 116/66)  RR: 15 (18 Sep 2018 08:15) (14 - 15)  SpO2: 97% (18 Sep 2018 08:15) (95% - 97%)    PHYSICAL EXAM:  General: NAD, A/O x 3  ENT: MMM  Neck: Supple, No JVD  Lungs: Clear to auscultation bilaterally  Cardio: RRR, S1/S2, No murmurs  Abdomen: Soft, Nontender, Nondistended; Bowel sounds present  Extremities:  Right knee mild swelling with aquacel dressing; Incision with staples CDI.  No erythema noted. Improving ROM    CURRENT FUNCTIONAL STATUS:  Transfers: supervision  Gait Amb 60' SAC CG  Stairs Negotiated 16steps 2 rails: Supervision  Toileting/grooming: Independent     LABS:                        10.1   12.4  )-----------( 449      ( 17 Sep 2018 05:35 )             30.6   09-17    141  |  104  |  14  ----------------------------<  102<H>  3.9   |  29  |  0.72    Ca    9.3      17 Sep 2018 05:35    TPro  6.0  /  Alb  2.6<L>  /  TBili  0.5  /  DBili  x   /  AST  16  /  ALT  29  /  AlkPhos  119  09-17 8/23 Hgb A1C 5.3    MEDICATIONS  (STANDING):  ascorbic acid 500 milliGRAM(s) Oral two times a day  buPROPion  milliGRAM(s) Oral two times a day  calcium carbonate 1250 mG  + Vitamin D (OsCal 500 + D) 1 Tablet(s) Oral three times a day  celecoxib 200 milliGRAM(s) Oral every 12 hours  docusate sodium 100 milliGRAM(s) Oral three times a day  enoxaparin Injectable 40 milliGRAM(s) SubCutaneous daily  gabapentin 100 milliGRAM(s) Oral at bedtime  HYDROmorphone   Tablet 4 milliGRAM(s) Oral <User Schedule>  lidocaine   Patch 2 Patch Transdermal daily  melatonin 6 milliGRAM(s) Oral at bedtime  multivitamin 1 Tablet(s) Oral daily  oxyCODONE  ER Tablet 20 milliGRAM(s) Oral every 12 hours  pantoprazole    Tablet 40 milliGRAM(s) Oral before breakfast  senna 2 Tablet(s) Oral at bedtime  traZODone 50 milliGRAM(s) Oral at bedtime  zinc sulfate 220 milliGRAM(s) Oral daily    MEDICATIONS  (PRN):  acetaminophen   Tablet .. 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    #S/p Right TKR revision  -Rehab: Continue comprehensive PT/OT for 3 hours daily.   -Weight bearing: WBAT RLE  -Pain control: Tylenol prn, ice prn, increase oxycontin 20 mg q12, dilaudid 2 or 4 mg prn, lidoderm patch, increased Celebrex from 100mg q12 to 200mg q12 due to moderately severe continual pain with much improvement. Discontinue gabapentin.   -DVT ppx: Lovenox 40mg daily for 2 weeks (until 9/20) followed by Aspirin 325mg twice daily for 4 weeks.    -GI ppx: Added Protonix  -Remove Staples Post-Op Day 21; Remove Dressing Post-Op Day 10; pt requested a new Aquacel  -CPM initiated 9/11/18 due to severe decrease in ROM - Cont CPM; recommend it for home use     #Psych, history of anxiety, depression  -On home trazodone and Wellbutrin.     #GI  -Bowel regimen: Cont colace, senna, bisacodyl suppository prn or dulc po q12 hrs prn   -Diet: Regular   -Nutrition: Vit C, MVI, zinc   -Transaminitis: mild elevation in Alk phos, ALT now resolved.     #Neuro  -Resolved: Patient with attention/concentration, working memory deficits, may be secondary to narcotic use, will monitor.

## 2018-09-18 NOTE — PROGRESS NOTE ADULT - ATTENDING COMMENTS
Agree with above.  Pt reports pain is much improved with current medication regimen.  ROM into flexion still severely limited.  Instructed pt in stretches to be performed independently and add heat packs prior to and during stretching.  Pt also with c/o dysphonia; add Cepacol lozenges prn.  Cont comprehensive rehab, d/c planning, dvt ppx

## 2018-09-19 PROCEDURE — 99232 SBSQ HOSP IP/OBS MODERATE 35: CPT

## 2018-09-19 RX ORDER — CELECOXIB 200 MG/1
200 CAPSULE ORAL EVERY 12 HOURS
Qty: 0 | Refills: 0 | Status: DISCONTINUED | OUTPATIENT
Start: 2018-09-19 | End: 2018-09-20

## 2018-09-19 RX ORDER — GABAPENTIN 400 MG/1
100 CAPSULE ORAL ONCE
Qty: 0 | Refills: 0 | Status: COMPLETED | OUTPATIENT
Start: 2018-09-19 | End: 2018-09-19

## 2018-09-19 RX ADMIN — Medication 1 TABLET(S): at 20:39

## 2018-09-19 RX ADMIN — Medication 50 MILLIGRAM(S): at 20:40

## 2018-09-19 RX ADMIN — HYDROMORPHONE HYDROCHLORIDE 4 MILLIGRAM(S): 2 INJECTION INTRAMUSCULAR; INTRAVENOUS; SUBCUTANEOUS at 20:41

## 2018-09-19 RX ADMIN — HYDROMORPHONE HYDROCHLORIDE 4 MILLIGRAM(S): 2 INJECTION INTRAMUSCULAR; INTRAVENOUS; SUBCUTANEOUS at 21:30

## 2018-09-19 RX ADMIN — HYDROMORPHONE HYDROCHLORIDE 4 MILLIGRAM(S): 2 INJECTION INTRAMUSCULAR; INTRAVENOUS; SUBCUTANEOUS at 12:40

## 2018-09-19 RX ADMIN — HYDROMORPHONE HYDROCHLORIDE 4 MILLIGRAM(S): 2 INJECTION INTRAMUSCULAR; INTRAVENOUS; SUBCUTANEOUS at 16:00

## 2018-09-19 RX ADMIN — Medication 100 MILLIGRAM(S): at 06:18

## 2018-09-19 RX ADMIN — LIDOCAINE 2 PATCH: 4 CREAM TOPICAL at 11:21

## 2018-09-19 RX ADMIN — HYDROMORPHONE HYDROCHLORIDE 2 MILLIGRAM(S): 2 INJECTION INTRAMUSCULAR; INTRAVENOUS; SUBCUTANEOUS at 08:09

## 2018-09-19 RX ADMIN — ZINC SULFATE TAB 220 MG (50 MG ZINC EQUIVALENT) 220 MILLIGRAM(S): 220 (50 ZN) TAB at 11:20

## 2018-09-19 RX ADMIN — Medication 500 MILLIGRAM(S): at 06:18

## 2018-09-19 RX ADMIN — Medication 650 MILLIGRAM(S): at 18:34

## 2018-09-19 RX ADMIN — CELECOXIB 200 MILLIGRAM(S): 200 CAPSULE ORAL at 06:18

## 2018-09-19 RX ADMIN — BUPROPION HYDROCHLORIDE 200 MILLIGRAM(S): 150 TABLET, EXTENDED RELEASE ORAL at 08:09

## 2018-09-19 RX ADMIN — Medication 650 MILLIGRAM(S): at 16:00

## 2018-09-19 RX ADMIN — HYDROMORPHONE HYDROCHLORIDE 4 MILLIGRAM(S): 2 INJECTION INTRAMUSCULAR; INTRAVENOUS; SUBCUTANEOUS at 11:34

## 2018-09-19 RX ADMIN — Medication 1 TABLET(S): at 11:20

## 2018-09-19 RX ADMIN — OXYCODONE HYDROCHLORIDE 20 MILLIGRAM(S): 5 TABLET ORAL at 18:33

## 2018-09-19 RX ADMIN — Medication 650 MILLIGRAM(S): at 08:10

## 2018-09-19 RX ADMIN — Medication 1 TABLET(S): at 14:52

## 2018-09-19 RX ADMIN — GABAPENTIN 100 MILLIGRAM(S): 400 CAPSULE ORAL at 23:12

## 2018-09-19 RX ADMIN — Medication 100 MILLIGRAM(S): at 14:52

## 2018-09-19 RX ADMIN — ENOXAPARIN SODIUM 40 MILLIGRAM(S): 100 INJECTION SUBCUTANEOUS at 11:21

## 2018-09-19 RX ADMIN — BUPROPION HYDROCHLORIDE 200 MILLIGRAM(S): 150 TABLET, EXTENDED RELEASE ORAL at 14:53

## 2018-09-19 RX ADMIN — Medication 6 MILLIGRAM(S): at 20:39

## 2018-09-19 RX ADMIN — Medication 100 MILLIGRAM(S): at 20:39

## 2018-09-19 RX ADMIN — LIDOCAINE 2 PATCH: 4 CREAM TOPICAL at 23:12

## 2018-09-19 RX ADMIN — PANTOPRAZOLE SODIUM 40 MILLIGRAM(S): 20 TABLET, DELAYED RELEASE ORAL at 06:18

## 2018-09-19 RX ADMIN — Medication 1 TABLET(S): at 06:18

## 2018-09-19 RX ADMIN — SENNA PLUS 2 TABLET(S): 8.6 TABLET ORAL at 20:40

## 2018-09-19 RX ADMIN — Medication 500 MILLIGRAM(S): at 17:13

## 2018-09-19 RX ADMIN — HYDROMORPHONE HYDROCHLORIDE 2 MILLIGRAM(S): 2 INJECTION INTRAMUSCULAR; INTRAVENOUS; SUBCUTANEOUS at 11:31

## 2018-09-19 RX ADMIN — HYDROMORPHONE HYDROCHLORIDE 4 MILLIGRAM(S): 2 INJECTION INTRAMUSCULAR; INTRAVENOUS; SUBCUTANEOUS at 06:59

## 2018-09-19 RX ADMIN — OXYCODONE HYDROCHLORIDE 20 MILLIGRAM(S): 5 TABLET ORAL at 06:18

## 2018-09-19 RX ADMIN — HYDROMORPHONE HYDROCHLORIDE 4 MILLIGRAM(S): 2 INJECTION INTRAMUSCULAR; INTRAVENOUS; SUBCUTANEOUS at 12:39

## 2018-09-19 RX ADMIN — OXYCODONE HYDROCHLORIDE 20 MILLIGRAM(S): 5 TABLET ORAL at 06:53

## 2018-09-19 RX ADMIN — CELECOXIB 200 MILLIGRAM(S): 200 CAPSULE ORAL at 06:53

## 2018-09-19 RX ADMIN — CELECOXIB 200 MILLIGRAM(S): 200 CAPSULE ORAL at 21:30

## 2018-09-19 RX ADMIN — Medication 650 MILLIGRAM(S): at 11:31

## 2018-09-19 RX ADMIN — CELECOXIB 200 MILLIGRAM(S): 200 CAPSULE ORAL at 20:51

## 2018-09-19 RX ADMIN — OXYCODONE HYDROCHLORIDE 20 MILLIGRAM(S): 5 TABLET ORAL at 17:13

## 2018-09-19 NOTE — PROGRESS NOTE ADULT - SUBJECTIVE AND OBJECTIVE BOX
HPI  Ms. Jones is a 67 year old F with PMH depression/anxiety, s/p right knee arthroplasty in 2014 (initially had a fall in a school parking lot in 2005 resulting in a "broken ankle" and multiple right knee surgeries including ACL repair, meniscal repair). Per documentation she had poor post operative therapy after her TKA in 2014. She then required arthroscopic surgery in 2015. She has had several subsequent falls, the last being in May/June 2018. Workup showed right knee fibrosis, thus she was admitted to [facility] for elective revision of her right knee arthroplasty on 9/6/18 by Dr. Chavez. No immediate postoperative complications. Underwent nerve block by anesthesia on 9/7 for severe pain. On 9/7/18 noted to have leukocytosis (WBC 18). Cultures from right knee/synovial fluid as well as urine culture all negative. WBC improved to 11 by day of discharge on 9/10/18. Patient also with low grade temperature (99F -100F) last night and this AM. Otherwise, medically optimized for discharge to acute rehab.     Weight bearing: WBAT RLE  DVT ppx: Lovenox 40mg daily for 2 weeks followed by Aspirin 325mg twice daily for 4 weeks  Remove Staples Post-Op Day 21; and Remove Dressing Post-Op Day 10    SUBJECTIVE:  Pt seen and examined.   Pain is much better controlled overall with more ROM.  Tolerating CPM well.  +BM yesterday.  Denies SOB, dyspnea, cough, dysuria, frequency or urgency.     ROS:  [X] Constitutional WNL         [X] Cardio            [X] Resp WNL           [X  ] GI WNL                          [x  ]  WNL                   [X] Heme WNL              [X] Endo WNL                     [X] Skin WNL                 [  ] MSK WNL            [X] Neuro WNL                   [ X ] Cognitive WNL        [X] Psych WNL    Vital Signs Last 24 Hrs  T(C): 36.4 (19 Sep 2018 08:37), Max: 37 (18 Sep 2018 19:35)  T(F): 97.5 (19 Sep 2018 08:37), Max: 98.6 (18 Sep 2018 19:35)  HR: 75 (19 Sep 2018 08:37) (70 - 75)  BP: 101/63 (19 Sep 2018 08:37) (101/63 - 118/65)  BP(mean): --  RR: 14 (19 Sep 2018 08:37) (14 - 15)  SpO2: 95% (19 Sep 2018 08:37) (95% - 99%)    PHYSICAL EXAM:  General: NAD, A/O x 3  ENT: MMM  Neck: Supple, No JVD  Lungs: Clear to auscultation bilaterally  Cardio: RRR, S1/S2, No murmurs  Abdomen: Soft, Nontender, Nondistended; Bowel sounds present  Extremities:  Right knee trace edema with aquacel dressing; Incision with staples CDI.  No erythema noted. Improving ROM    CURRENT FUNCTIONAL STATUS:  Transfers: supervision  Gait Amb 60' SAC CG  Stairs Negotiated 16steps 2 rails: Supervision  Toileting/grooming: Independent     LABS:                        10.1   12.4  )-----------( 449      ( 17 Sep 2018 05:35 )             30.6   09-17    141  |  104  |  14  ----------------------------<  102<H>  3.9   |  29  |  0.72    Ca    9.3      17 Sep 2018 05:35    TPro  6.0  /  Alb  2.6<L>  /  TBili  0.5  /  DBili  x   /  AST  16  /  ALT  29  /  AlkPhos  119  09-17 8/23 Hgb A1C 5.3    MEDICATIONS  (STANDING):  ascorbic acid 500 milliGRAM(s) Oral two times a day  buPROPion  milliGRAM(s) Oral two times a day  calcium carbonate 1250 mG  + Vitamin D (OsCal 500 + D) 1 Tablet(s) Oral three times a day  celecoxib 200 milliGRAM(s) Oral every 12 hours  docusate sodium 100 milliGRAM(s) Oral three times a day  enoxaparin Injectable 40 milliGRAM(s) SubCutaneous daily  HYDROmorphone   Tablet 4 milliGRAM(s) Oral <User Schedule>  lidocaine   Patch 2 Patch Transdermal daily  melatonin 6 milliGRAM(s) Oral at bedtime  multivitamin 1 Tablet(s) Oral daily  oxyCODONE  ER Tablet 20 milliGRAM(s) Oral every 12 hours  pantoprazole    Tablet 40 milliGRAM(s) Oral before breakfast  senna 2 Tablet(s) Oral at bedtime  traZODone 50 milliGRAM(s) Oral at bedtime  zinc sulfate 220 milliGRAM(s) Oral daily    MEDICATIONS  (PRN):  acetaminophen   Tablet .. 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    #S/p Right TKR revision  -Rehab: Continue comprehensive PT/OT for 3 hours daily.   -Weight bearing: WBAT RLE  -Pain control:  Tylenol prn, ice prn, increased oxycontin 20 mg q12, dilaudid 2 or 4 mg prn, lidoderm patch, increased Celebrex from 100mg q12 to 200mg q12 due to moderately severe continual pain with much improvement. Much improved; change Celebrex to prn and discontinue gabapentin.   -DVT ppx: Lovenox 40mg daily for 2 weeks (until 9/20) followed by Aspirin 325mg twice daily for 4 weeks.    -GI ppx: Added Protonix  -Remove Staples Post-Op Day 21; Remove Dressing Post-Op Day 10; pt requested a new Aquacel  -CPM initiated 9/11/18 due to severe decrease in ROM - Cont CPM; recommend it for home use     #Psych, history of anxiety, depression  -On home trazodone and Wellbutrin.     #GI  -Bowel regimen: Cont colace, senna, dulc po q12 hrs prn   -Diet: Regular   -Nutrition: Vit C, MVI, zinc   -Transaminitis: mild elevation in Alk phos, ALT now resolved.     #Neuro  -Resolved: Patient with attention/concentration, working memory deficits, may be secondary to narcotic use, will monitor.      #Leukocytosis  -Improved.  Cultures negative.  F/U CBC in am

## 2018-09-20 ENCOUNTER — TRANSCRIPTION ENCOUNTER (OUTPATIENT)
Age: 68
End: 2018-09-20

## 2018-09-20 VITALS
DIASTOLIC BLOOD PRESSURE: 65 MMHG | RESPIRATION RATE: 14 BRPM | SYSTOLIC BLOOD PRESSURE: 113 MMHG | HEART RATE: 73 BPM | TEMPERATURE: 98 F | OXYGEN SATURATION: 96 %

## 2018-09-20 LAB
HCT VFR BLD CALC: 28.7 % — LOW (ref 34.5–45)
HGB BLD-MCNC: 9.7 G/DL — LOW (ref 11.5–15.5)
MCHC RBC-ENTMCNC: 31.5 PG — SIGNIFICANT CHANGE UP (ref 27–34)
MCHC RBC-ENTMCNC: 33.6 GM/DL — SIGNIFICANT CHANGE UP (ref 32–36)
MCV RBC AUTO: 93.8 FL — SIGNIFICANT CHANGE UP (ref 80–100)
PLATELET # BLD AUTO: 404 K/UL — HIGH (ref 150–400)
RBC # BLD: 3.06 M/UL — LOW (ref 3.8–5.2)
RBC # FLD: 11.5 % — SIGNIFICANT CHANGE UP (ref 10.3–14.5)
WBC # BLD: 8.3 K/UL — SIGNIFICANT CHANGE UP (ref 3.8–10.5)
WBC # FLD AUTO: 8.3 K/UL — SIGNIFICANT CHANGE UP (ref 3.8–10.5)

## 2018-09-20 PROCEDURE — 99238 HOSP IP/OBS DSCHRG MGMT 30/<: CPT

## 2018-09-20 RX ORDER — HYDROMORPHONE HYDROCHLORIDE 2 MG/ML
1 INJECTION INTRAMUSCULAR; INTRAVENOUS; SUBCUTANEOUS
Qty: 0 | Refills: 0 | COMMUNITY

## 2018-09-20 RX ORDER — HYDROMORPHONE HYDROCHLORIDE 2 MG/ML
2 INJECTION INTRAMUSCULAR; INTRAVENOUS; SUBCUTANEOUS
Qty: 84 | Refills: 0
Start: 2018-09-20 | End: 2018-09-26

## 2018-09-20 RX ORDER — HYDROMORPHONE HYDROCHLORIDE 2 MG/ML
1 INJECTION INTRAMUSCULAR; INTRAVENOUS; SUBCUTANEOUS
Qty: 0 | Refills: 0 | DISCHARGE
Start: 2018-09-20

## 2018-09-20 RX ORDER — PANTOPRAZOLE SODIUM 20 MG/1
1 TABLET, DELAYED RELEASE ORAL
Qty: 30 | Refills: 0
Start: 2018-09-20 | End: 2018-10-19

## 2018-09-20 RX ORDER — TRAZODONE HCL 50 MG
1 TABLET ORAL
Qty: 0 | Refills: 0 | DISCHARGE
Start: 2018-09-20

## 2018-09-20 RX ORDER — OXYCODONE HYDROCHLORIDE 5 MG/1
1 TABLET ORAL
Qty: 0 | Refills: 0 | COMMUNITY

## 2018-09-20 RX ORDER — CELECOXIB 200 MG/1
1 CAPSULE ORAL
Qty: 60 | Refills: 0
Start: 2018-09-20 | End: 2018-10-19

## 2018-09-20 RX ORDER — ASPIRIN/CALCIUM CARB/MAGNESIUM 324 MG
1 TABLET ORAL
Qty: 56 | Refills: 0
Start: 2018-09-20 | End: 2018-10-17

## 2018-09-20 RX ORDER — BUPROPION HYDROCHLORIDE 150 MG/1
1 TABLET, EXTENDED RELEASE ORAL
Qty: 0 | Refills: 0 | COMMUNITY

## 2018-09-20 RX ORDER — ACETAMINOPHEN 500 MG
2 TABLET ORAL
Qty: 0 | Refills: 0 | DISCHARGE
Start: 2018-09-20

## 2018-09-20 RX ORDER — BUPROPION HYDROCHLORIDE 150 MG/1
1 TABLET, EXTENDED RELEASE ORAL
Qty: 0 | Refills: 0 | DISCHARGE
Start: 2018-09-20

## 2018-09-20 RX ORDER — OXYCODONE HYDROCHLORIDE 5 MG/1
2 TABLET ORAL
Qty: 28 | Refills: 0 | OUTPATIENT
Start: 2018-09-20 | End: 2018-09-26

## 2018-09-20 RX ORDER — TRAZODONE HCL 50 MG
1 TABLET ORAL
Qty: 0 | Refills: 0 | COMMUNITY

## 2018-09-20 RX ORDER — OXYCODONE HYDROCHLORIDE 5 MG/1
2 TABLET ORAL
Qty: 28 | Refills: 0
Start: 2018-09-20 | End: 2018-09-26

## 2018-09-20 RX ADMIN — ZINC SULFATE TAB 220 MG (50 MG ZINC EQUIVALENT) 220 MILLIGRAM(S): 220 (50 ZN) TAB at 11:44

## 2018-09-20 RX ADMIN — Medication 1 TABLET(S): at 11:44

## 2018-09-20 RX ADMIN — HYDROMORPHONE HYDROCHLORIDE 4 MILLIGRAM(S): 2 INJECTION INTRAMUSCULAR; INTRAVENOUS; SUBCUTANEOUS at 14:48

## 2018-09-20 RX ADMIN — Medication 100 MILLIGRAM(S): at 06:28

## 2018-09-20 RX ADMIN — OXYCODONE HYDROCHLORIDE 20 MILLIGRAM(S): 5 TABLET ORAL at 06:28

## 2018-09-20 RX ADMIN — Medication 100 MILLIGRAM(S): at 11:43

## 2018-09-20 RX ADMIN — PANTOPRAZOLE SODIUM 40 MILLIGRAM(S): 20 TABLET, DELAYED RELEASE ORAL at 06:29

## 2018-09-20 RX ADMIN — LIDOCAINE 2 PATCH: 4 CREAM TOPICAL at 11:44

## 2018-09-20 RX ADMIN — HYDROMORPHONE HYDROCHLORIDE 4 MILLIGRAM(S): 2 INJECTION INTRAMUSCULAR; INTRAVENOUS; SUBCUTANEOUS at 15:31

## 2018-09-20 RX ADMIN — BUPROPION HYDROCHLORIDE 200 MILLIGRAM(S): 150 TABLET, EXTENDED RELEASE ORAL at 08:20

## 2018-09-20 RX ADMIN — Medication 1 TABLET(S): at 13:22

## 2018-09-20 RX ADMIN — CELECOXIB 200 MILLIGRAM(S): 200 CAPSULE ORAL at 08:20

## 2018-09-20 RX ADMIN — Medication 1 TABLET(S): at 06:27

## 2018-09-20 RX ADMIN — HYDROMORPHONE HYDROCHLORIDE 4 MILLIGRAM(S): 2 INJECTION INTRAMUSCULAR; INTRAVENOUS; SUBCUTANEOUS at 06:28

## 2018-09-20 RX ADMIN — OXYCODONE HYDROCHLORIDE 20 MILLIGRAM(S): 5 TABLET ORAL at 06:59

## 2018-09-20 RX ADMIN — Medication 650 MILLIGRAM(S): at 14:48

## 2018-09-20 RX ADMIN — CELECOXIB 200 MILLIGRAM(S): 200 CAPSULE ORAL at 09:00

## 2018-09-20 RX ADMIN — HYDROMORPHONE HYDROCHLORIDE 4 MILLIGRAM(S): 2 INJECTION INTRAMUSCULAR; INTRAVENOUS; SUBCUTANEOUS at 06:58

## 2018-09-20 RX ADMIN — BUPROPION HYDROCHLORIDE 200 MILLIGRAM(S): 150 TABLET, EXTENDED RELEASE ORAL at 13:23

## 2018-09-20 RX ADMIN — Medication 500 MILLIGRAM(S): at 06:27

## 2018-09-20 RX ADMIN — ENOXAPARIN SODIUM 40 MILLIGRAM(S): 100 INJECTION SUBCUTANEOUS at 11:43

## 2018-09-20 RX ADMIN — Medication 650 MILLIGRAM(S): at 13:23

## 2018-09-20 NOTE — DISCHARGE NOTE ADULT - PATIENT PORTAL LINK FT
You can access the [facility], offered by [facility], by registering with the following website: [email or web address]

## 2018-09-20 NOTE — DISCHARGE NOTE ADULT - CARE PLAN
Principal Discharge DX:	Status post knee replacement, unspecified laterality  Goal:	Continue PT/OT  Assessment and plan of treatment:	-Take oxycontin twice a day (long acting pain relief), taper dose as tolerated  -Take dilaudid AS NEEDED for pain  -Take celebrex AS NEEDED for pain, take with FOOD  -Follow up with your surgeon.  Secondary Diagnosis:	Depression, unspecified depression type  Assessment and plan of treatment:	-Continue your home Wellbutrin and trazodone Principal Discharge DX:	Status post knee replacement, unspecified laterality  Goal:	Continue PT/OT  Assessment and plan of treatment:	-Take oxycontin twice a day (long acting pain relief), taper dose as tolerated  -Take dilaudid AS NEEDED for pain  -Take celebrex AS NEEDED for pain, take with FOOD  -Take aspirin 325 mg two times a day for 30 days for blood clot prevention  -Follow up with Dr. Chavez; [phone]  -Take protonix daily for one month while on aspirin  Secondary Diagnosis:	Depression, unspecified depression type  Assessment and plan of treatment:	-Continue your home Wellbutrin and trazodone

## 2018-09-20 NOTE — DISCHARGE NOTE ADULT - PLAN OF CARE
Continue PT/OT -Take oxycontin twice a day (long acting pain relief), taper dose as tolerated  -Take dilaudid AS NEEDED for pain  -Take celebrex AS NEEDED for pain, take with FOOD  -Follow up with your surgeon. -Continue your home Wellbutrin and trazodone -Take oxycontin twice a day (long acting pain relief), taper dose as tolerated  -Take dilaudid AS NEEDED for pain  -Take celebrex AS NEEDED for pain, take with FOOD  -Take aspirin 325 mg two times a day for 30 days for blood clot prevention  -Follow up with Dr. Chavez; [phone]  -Take protonix daily for one month while on aspirin -Take oxycontin twice a day (long acting pain relief), taper dose as tolerated  -Take dilaudid AS NEEDED for pain  -Take celebrex AS NEEDED for pain, take with FOOD  -Take aspirin 325 mg two times a day for 30 days for blood clot prevention  -Follow up with Dr. Chavez; [phone]  -Take Protonix daily for one month while on aspirin

## 2018-09-20 NOTE — DISCHARGE NOTE ADULT - MEDICATION SUMMARY - MEDICATIONS TO TAKE
I will START or STAY ON the medications listed below when I get home from the hospital:    acetaminophen 325 mg oral tablet  -- 2 tab(s) by mouth every 6 hours, As needed, Temp greater or equal to 38C (100.4F), Mild Pain (1 - 3)  -- Indication: For Pain    aspirin 325 mg oral tablet  -- 1 tab(s) by mouth 2 times a day  -- Indication: For Clot prevention     celecoxib 200 mg oral capsule  -- 1 cap(s) by mouth every 12 hours, AS NEEDED for pain.  Take with food.  -- Indication: For As needed for pain     HYDROmorphone 4 mg oral tablet  -- 1 tab(s) by mouth   -- Indication: For Pain     HYDROmorphone 4 mg oral tablet  -- 1 tab(s) by mouth every 4 hours, As needed, Severe Pain (7 - 10)  -- Indication: For Pain     HYDROmorphone 2 mg oral tablet  -- 1 tab(s) by mouth every 6 hours, As needed, Moderate Pain (4 - 6)  -- Indication: For Pain    traZODone 50 mg oral tablet  -- 1 tab(s) by mouth once a day (at bedtime)  -- Indication: For Mood     pantoprazole 40 mg oral delayed release tablet  -- 1 tab(s) by mouth once a day (before a meal)  -- Indication: For Acid reflux     buPROPion 200 mg/12 hours (SR) oral tablet, extended release  -- 1 tab(s) by mouth 2 times a day  -- Indication: For Mood     Multiple Vitamins oral tablet  -- 1 tab(s) by mouth once a day  -- Indication: For Nutrition I will START or STAY ON the medications listed below when I get home from the hospital:    acetaminophen 325 mg oral tablet  -- 2 tab(s) by mouth every 6 hours, As needed, Temp greater or equal to 38C (100.4F), Mild Pain (1 - 3)  -- Indication: For Pain    aspirin 325 mg oral tablet  -- 1 tab(s) by mouth 2 times a day  -- Indication: For Clot prevention     celecoxib 200 mg oral capsule  -- 1 cap(s) by mouth every 12 hours, AS NEEDED for pain.  Take with food.  -- Indication: For As needed for pain     HYDROmorphone 4 mg oral tablet  -- 1 tab(s) by mouth   -- Indication: For Pain     HYDROmorphone 4 mg oral tablet  -- 1 tab(s) by mouth every 4 hours, As needed, Severe Pain (7 - 10)  -- Indication: For Pain     HYDROmorphone 2 mg oral tablet  -- 1 tab(s) by mouth every 6 hours, As needed, Moderate Pain (4 - 6)  -- Indication: For Pain    HYDROmorphone 2 mg oral tablet  -- 2 tab(s) by mouth every 4 hours, As Needed -Moderate Pain (4 - 6) MDD:8 tabs  -- Indication: For Pain    OxyCONTIN 10 mg oral tablet, extended release  -- 2 tab(s) by mouth every 12 hours then wean to 1 tab every 12 hrs MDD:8 tabs  -- Caution federal law prohibits the transfer of this drug to any person other  than the person for whom it was prescribed.  Check with your doctor before becoming pregnant.  Do not chew, break, or crush.  Do not drink alcoholic beverages when taking this medication.  May cause drowsiness.  Alcohol may intensify this effect.  Use care when operating dangerous machinery.  Obtain medical advice before taking any non-prescription drugs as some may affect the action of this medication.  Swallow whole.  Do not crush.  This drug may impair the ability to drive or operate machinery.  Use care until you become familiar with its effects.  This prescription cannot be refilled.  Using more of this medication than prescribed may cause serious breathing problems.    -- Indication: For Pain    traZODone 50 mg oral tablet  -- 1 tab(s) by mouth once a day (at bedtime)  -- Indication: For Mood     pantoprazole 40 mg oral delayed release tablet  -- 1 tab(s) by mouth once a day (before a meal)  -- Indication: For Acid reflux     buPROPion 200 mg/12 hours (SR) oral tablet, extended release  -- 1 tab(s) by mouth 2 times a day  -- Indication: For Mood     Multiple Vitamins oral tablet  -- 1 tab(s) by mouth once a day  -- Indication: For Nutrition I will START or STAY ON the medications listed below when I get home from the hospital:    acetaminophen 325 mg oral tablet  -- 2 tab(s) by mouth every 6 hours, As needed, Temp greater or equal to 38C (100.4F), Mild Pain (1 - 3)  -- Indication: For Pain    aspirin 325 mg oral tablet  -- 1 tab(s) by mouth 2 times a day  -- Indication: For Clot prevention     celecoxib 200 mg oral capsule  -- 1 cap(s) by mouth every 12 hours, AS NEEDED for pain.  Take with food.  -- Indication: For As needed for pain     HYDROmorphone 4 mg oral tablet  -- 1 tab(s) by mouth   -- Indication: For Pain     HYDROmorphone 4 mg oral tablet  -- 1 tab(s) by mouth every 4 hours, As needed, Severe Pain (7 - 10)  -- Indication: For Pain     HYDROmorphone 2 mg oral tablet  -- 1 tab(s) by mouth every 6 hours, As needed, Moderate Pain (4 - 6)  -- Indication: For Pain    HYDROmorphone 2 mg oral tablet  -- 2 tab(s) by mouth every 4 hours, As Needed -Moderate Pain (4 - 6) MDD:8 tabs  -- Indication: For Pain    OxyCONTIN 10 mg oral tablet, extended release  -- 2 tab(s) by mouth every 12 hours then wean to 1 tab every 12 hrs MDD:8 tabs  -- Caution federal law prohibits the transfer of this drug to any person other  than the person for whom it was prescribed.  Check with your doctor before becoming pregnant.  Do not chew, break, or crush.  Do not drink alcoholic beverages when taking this medication.  May cause drowsiness.  Alcohol may intensify this effect.  Use care when operating dangerous machinery.  Obtain medical advice before taking any non-prescription drugs as some may affect the action of this medication.  Swallow whole.  Do not crush.  This drug may impair the ability to drive or operate machinery.  Use care until you become familiar with its effects.  This prescription cannot be refilled.  Using more of this medication than prescribed may cause serious breathing problems.    -- Indication: For Pain    traZODone 50 mg oral tablet  -- 1 tab(s) by mouth once a day (at bedtime)  -- Indication: For Mood     Lidoderm  -- 4% patch  Use up to 2 patches daily, 12 hours on, 12 hours off.  Purchase over the counter   -- Indication: For Pain     pantoprazole 40 mg oral delayed release tablet  -- 1 tab(s) by mouth once a day (before a meal)  -- Indication: For Acid reflux     buPROPion 200 mg/12 hours (SR) oral tablet, extended release  -- 1 tab(s) by mouth 2 times a day  -- Indication: For Mood     Multiple Vitamins oral tablet  -- 1 tab(s) by mouth once a day  -- Indication: For Nutrition

## 2018-09-20 NOTE — DISCHARGE NOTE ADULT - CARE PROVIDERS DIRECT ADDRESSES
[email or web address]

## 2018-09-20 NOTE — DISCHARGE NOTE ADULT - MEDICATION SUMMARY - MEDICATIONS TO STOP TAKING
I will STOP taking the medications listed below when I get home from the hospital:    enoxaparin  -- 40 milligram(s) subcutaneous once a day for 14 days. Please do not skip doses.

## 2018-09-20 NOTE — DISCHARGE NOTE ADULT - CARE PROVIDER_API CALL
Allison Ferreira), PhysicalRehab Medicine  [address] [place] [address]  Phone: [phone]  Fax: [phone]    Kanu Chavez), Orthopaedic Surgery  [address] [place] [address]  Phone: [phone]  Fax: [phone]

## 2018-09-20 NOTE — PROGRESS NOTE ADULT - ATTENDING COMMENTS
Agree with above.  Medically stable.  Pt met all inpatient rehab goals.  Instructed pt in stretches/ROM to be performed on independent HEP.  D/C home with outpatient PT

## 2018-09-20 NOTE — DISCHARGE NOTE ADULT - HOSPITAL COURSE
Ms. Jones is a 67 year old F with PMH depression/anxiety, s/p right knee arthroplasty in 2014 (initially had a fall in a school parking lot in 2005 resulting in a "broken ankle" and multiple right knee surgeries including ACL repair, meniscal repair). Per documentation she had poor post operative therapy after her TKA in 2014. She then required arthroscopic surgery in 2015. She has had several subsequent falls, the last being in May/June 2018. 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 Chavez. No immediate postoperative complications. Underwent nerve block by anesthesia on 9/7 for severe pain. On 9/7/18 noted to have leukocytosis (WBC 18). Cultures from right knee/synovial fluid as well as urine culture all negative. WBC improved to 11 by day of discharge on 9/10/18. Patient also with low grade temperature (99F -100F) last night and this AM. Otherwise, medically optimized for discharge to acute rehab.     Weight bearing: WBAT RLE  DVT ppx: Lovenox 40mg daily for 2 weeks followed by Aspirin 325mg twice daily for 4 weeks  Remove Staples Post-Op Day 21; and Remove Dressing Post-Op Day 10    Admitted to acute inpatient rehab at [facility] on 9/10/18. Tolerated daily therapies with improvement in function as well as ROM of right knee (on day of discharge, 70 degrees of flexion, extension to ~7 degrees). CPM machine initiated early during rehab stay for improved ROM. Rehab course significant for severe pain which gradually improved on regimen of oxycontin BID, dilaudid prn, celebrex 200 mg BID, and lidoderm patches. Patient to be discharged on aspirin 325 mg BID x 4 weeks for DVT prophylaxis per Orthopedics instructions. Protonix prescribed x 30 days for GI prophylaxis. Patient stable for discharge home with outpatient physical therapy.

## 2018-09-20 NOTE — PROGRESS NOTE ADULT - SUBJECTIVE AND OBJECTIVE BOX
HPI  Ms. Jones is a 67 year old F with PMH depression/anxiety, s/p right knee arthroplasty in 2014 (initially had a fall in a school parking lot in 2005 resulting in a "broken ankle" and multiple right knee surgeries including ACL repair, meniscal repair). Per documentation she had poor post operative therapy after her TKA in 2014. She then required arthroscopic surgery in 2015. She has had several subsequent falls, the last being in May/June 2018. 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 Chavez. No immediate postoperative complications. Underwent nerve block by anesthesia on 9/7 for severe pain. On 9/7/18 noted to have leukocytosis (WBC 18). Cultures from right knee/synovial fluid as well as urine culture all negative. WBC improved to 11 by day of discharge on 9/10/18. Patient also with low grade temperature (99F -100F) last night and this AM. Otherwise, medically optimized for discharge to acute rehab.     Weight bearing: WBAT RLE  DVT ppx: Lovenox 40mg daily for 2 weeks followed by Aspirin 325mg twice daily for 4 weeks  Remove Staples Post-Op Day 21; and Remove Dressing Post-Op Day 10    SUBJECTIVE:  Pt seen and examined. Pain is much better controlled overall with more ROM. Denies SOB, dyspnea, cough, dysuria, frequency or urgency. Ready for discharge home today.     ROS:  [X] Constitutional WNL         [X] Cardio            [X] Resp WNL           [X  ] GI WNL                          [x  ]  WNL                   [X] Heme WNL              [X] Endo WNL                     [X] Skin WNL                 [  ] MSK WNL            [X] Neuro WNL                   [ X ] Cognitive WNL        [X] Psych WNL    Vital Signs Last 24 Hrs  T(C): 36.6 (20 Sep 2018 08:16), Max: 37.1 (19 Sep 2018 20:54)  T(F): 97.9 (20 Sep 2018 08:16), Max: 98.8 (19 Sep 2018 20:54)  HR: 73 (20 Sep 2018 08:16) (73 - 75)  BP: 113/65 (20 Sep 2018 08:16) (106/60 - 113/65)  RR: 14 (20 Sep 2018 08:16) (14 - 14)  SpO2: 96% (20 Sep 2018 08:16) (95% - 96%)    PHYSICAL EXAM:  General: NAD, A/O x 3  ENT: MMM  Neck: Supple, No JVD  Lungs: Clear to auscultation bilaterally  Cardio: RRR, S1/S2, No murmurs  Abdomen: Soft, Nontender, Nondistended; Bowel sounds present  Extremities:  Right knee trace edema with aquacel dressing; Incision with staples CDI.  No erythema noted. Improving ROM    CURRENT FUNCTIONAL STATUS:  Transfers: Mod I  Gait Amb 200' SAC Independent  Stairs Negotiated 16steps independent  Toileting/grooming: Independent     LABS:                               9.7    8.3   )-----------( 404      ( 20 Sep 2018 05:10 )             28.7     8/23 Hgb A1C 5.3    MEDICATIONS  (STANDING):  ascorbic acid 500 milliGRAM(s) Oral two times a day  buPROPion  milliGRAM(s) Oral two times a day  calcium carbonate 1250 mG  + Vitamin D (OsCal 500 + D) 1 Tablet(s) Oral three times a day  docusate sodium 100 milliGRAM(s) Oral three times a day  enoxaparin Injectable 40 milliGRAM(s) SubCutaneous daily  HYDROmorphone   Tablet 4 milliGRAM(s) Oral <User Schedule>  lidocaine   Patch 2 Patch Transdermal daily  melatonin 6 milliGRAM(s) Oral at bedtime  multivitamin 1 Tablet(s) Oral daily  oxyCODONE  ER Tablet 20 milliGRAM(s) Oral every 12 hours  pantoprazole    Tablet 40 milliGRAM(s) Oral before breakfast  senna 2 Tablet(s) Oral at bedtime  traZODone 50 milliGRAM(s) Oral at bedtime  zinc sulfate 220 milliGRAM(s) Oral daily    MEDICATIONS  (PRN):  acetaminophen   Tablet .. 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    #S/p Right TKR revision  -Weight bearing: WBAT RLE  -Pain control:  Tylenol prn, ice prn, oxycontin 20 mg q12, dilaudid 2 or 4 mg prn, lidoderm patch, increased Celebrex from 100mg q12 to 200mg q12 due to moderately severe continual pain with much improvement. Much improved; change Celebrex to prn and discontinue gabapentin (9/19).   -DVT ppx: Lovenox 40mg daily for 2 weeks (until 9/20) followed by Aspirin 325mg twice daily for 4 weeks.    -GI ppx: Protonix  -Remove Staples Post-Op Day 21; Remove Dressing Post-Op Day 10; pt requested a new Aquacel  -CPM initiated 9/11/18 due to severe decrease in ROM - Cont CPM; recommend it for home use     #Psych, history of anxiety, depression  -On home trazodone and Wellbutrin.     #GI  -Bowel regimen: Cont colace, senna, dulc po q12 hrs prn   -Diet: Regular   -Nutrition: Vit C, MVI, zinc   -Transaminitis: mild elevation in Alk phos, ALT now resolved.     #Neuro  -Resolved: Patient with attention/concentration, working memory deficits, may be secondary to narcotic use, will monitor.      #Leukocytosis  -Resolved on labs this morning    Stable for discharge home with outpatient therapy.

## 2018-09-20 NOTE — DISCHARGE NOTE ADULT - ADDITIONAL INSTRUCTIONS
Follow up with your family doctor within one week of discharge  Follow up with Dr. Chavez  Follow up with Dr. Ferreira (Physical Medicine and Rehabilitation) in 4 weeks

## 2018-09-21 RX ORDER — ASPIRIN/CALCIUM CARB/MAGNESIUM 324 MG
1 TABLET ORAL
Qty: 0 | Refills: 0 | COMMUNITY
Start: 2018-09-21 | End: 2018-10-20

## 2018-09-24 ENCOUNTER — OUTPATIENT (OUTPATIENT)
Dept: OUTPATIENT SERVICES | Facility: HOSPITAL | Age: 68
LOS: 1 days | End: 2018-09-24
Payer: MEDICARE

## 2018-09-24 DIAGNOSIS — Z96.659 PRESENCE OF UNSPECIFIED ARTIFICIAL KNEE JOINT: Chronic | ICD-10-CM

## 2018-09-24 DIAGNOSIS — Z96.659 PRESENCE OF UNSPECIFIED ARTIFICIAL KNEE JOINT: ICD-10-CM

## 2018-09-24 DIAGNOSIS — Z98.890 OTHER SPECIFIED POSTPROCEDURAL STATES: Chronic | ICD-10-CM

## 2018-09-25 ENCOUNTER — INBOUND DOCUMENT (OUTPATIENT)
Age: 68
End: 2018-09-25

## 2018-09-27 PROCEDURE — 80074 ACUTE HEPATITIS PANEL: CPT

## 2018-09-27 PROCEDURE — 80053 COMPREHEN METABOLIC PANEL: CPT

## 2018-09-27 PROCEDURE — 97530 THERAPEUTIC ACTIVITIES: CPT

## 2018-09-27 PROCEDURE — 97535 SELF CARE MNGMENT TRAINING: CPT

## 2018-09-27 PROCEDURE — 97116 GAIT TRAINING THERAPY: CPT

## 2018-09-27 PROCEDURE — 97167 OT EVAL HIGH COMPLEX 60 MIN: CPT

## 2018-09-27 PROCEDURE — 97110 THERAPEUTIC EXERCISES: CPT

## 2018-09-27 PROCEDURE — 85027 COMPLETE CBC AUTOMATED: CPT

## 2018-09-27 PROCEDURE — 97163 PT EVAL HIGH COMPLEX 45 MIN: CPT

## 2018-09-28 ENCOUNTER — APPOINTMENT (OUTPATIENT)
Dept: ORTHOPEDIC SURGERY | Facility: CLINIC | Age: 68
End: 2018-09-28
Payer: MEDICARE

## 2018-09-28 PROCEDURE — 99024 POSTOP FOLLOW-UP VISIT: CPT

## 2018-10-31 ENCOUNTER — APPOINTMENT (OUTPATIENT)
Dept: ORTHOPEDIC SURGERY | Facility: CLINIC | Age: 68
End: 2018-10-31
Payer: MEDICARE

## 2018-10-31 DIAGNOSIS — Z96.651 AFTERCARE FOLLOWING JOINT REPLACEMENT SURGERY: ICD-10-CM

## 2018-10-31 DIAGNOSIS — Z47.1 AFTERCARE FOLLOWING JOINT REPLACEMENT SURGERY: ICD-10-CM

## 2018-10-31 PROCEDURE — 73560 X-RAY EXAM OF KNEE 1 OR 2: CPT | Mod: LT

## 2018-10-31 PROCEDURE — 99024 POSTOP FOLLOW-UP VISIT: CPT

## 2018-10-31 PROCEDURE — 73562 X-RAY EXAM OF KNEE 3: CPT | Mod: RT

## 2018-10-31 NOTE — OCCUPATIONAL THERAPY INITIAL EVALUATION ADULT - RUE MMT, REHAB EVAL
So patient wanted percocet sent to [facility] but it was sent to [facility].  [facility] is listed as primary pharmacy but I've removed it now. Can it be resent to [facility] pharmacy without him having a drug screen? Or what do I need to tell him?   no strength deficits were identified

## 2018-12-05 NOTE — PATIENT PROFILE ADULT. - FUNCTIONAL SCREEN CURRENT LEVEL: COMMUNICATION, MLM
Patient Instructions by Chase De Santiago PA at 04/17/18 08:28 AM     Author:  Chase De Santiago PA Service:  (none) Author Type:  Physician Assistant     Filed:  04/17/18 08:29 AM Encounter Date:  4/17/2018 Status:  Addendum     :  Chase De Santiago PA (Physician Assistant)              PATIENT INFORMATION   monistat OTc, 3 or 5 day treatment options available    increase fluoxetine to 15mg daily (1.5 abs)    my chart message or phone call in 1 month    recheck in office in 3 months, earlier if needed    Follow-Up  -- Make an appointment with MARIELA Powell as per above    Additional Educational Resources:  For additional resources regarding your symptoms, diagnosis, or further health information, please visit the Health Resources section on [email or web address] or the Online Health Resources section in [employer].        Vaginal yeast infection  Definition   Vaginal yeast infection is an infection of the vagina. It is most commonly due to the fungus Candida albicans.   Alternative Names   Yeast infection - vagina; Vaginal candidiasis; Monilial vaginitis   Causes   Most women have a vaginal yeast infection at some time. Candida albicans is a common type of fungus. It is often found in small amounts in the vagina, mouth, digestive tract, and on the skin. Most of the time, it does not cause infection or symptoms.   Candida and the many other germs that normally live in the vagina keep each other in balance. However, sometimes the number of Candida increases, leading to a yeast infection.   This can happen if:   · You are taking antibiotics used to treat another infection. Antibiotics change the normal balance between germs in the vagina.  · You are pregnant  · You are obese  · You have diabetes  A yeast infection is not spread through sexual contact. However, some men will develop symptoms such as itching and a rash on the penis after having sexual contact with an infected partner.   Having many  vaginal yeast infections may be a sign of other health problems. Other vaginal infections and discharges can be mistaken for a vaginal yeast infection.   Symptoms   Symptoms include:   · Abnormal vaginal discharge. Discharge can range from slightly watery, white discharge to thick, white, and chunky (like cottage cheese).  · Itching and burning of the vagina and labia  · Pain with intercourse  · Painful urination  · Redness and swelling of the skin just outside of the vagina (vulva)  Exams and Tests   You will have a pelvic exam. It may show:   · Swelling and redness of the skin of the vulva, in the vagina, and on the cervix  · Dry, white spots on the vaginal wall  · Cracks in the skin of the vulva.  A small amount of the vaginal discharge is examined using a microscope. This is called a wet mount and KOH test.   Sometimes, a culture is taken when the infection does not get better with treatment or comes back many times.   Your health care provider may order other tests to rule out other causes of your symptoms.   Treatment   Medicines to treat vaginal yeast infections are available as creams, ointments, vaginal tablets or suppositories and oral tablets. Most can be bought without needing to see your provider.   Treating yourself at home is probably OK if:   · Your symptoms are mild and you do not have pelvic pain or a fever  · This is not your first yeast infection and you have not had many yeast infections in the past  · You are not pregnant  · You are not worried about other sexually transmitted infections from recent sexual contact  Medicines you can buy yourself to treat a vaginal yeast infection are:   · Miconazole  · Clotrimazole  · Tioconazole  · Butoconazole  When using these medicines:   · Read the packages carefully and use them as directed.  · You will need to take the medicine for 1 to 7 days, depending on which medicine you buy. (If you do not get repeated infections, a 1-day medicine might work for  you.)  · Do not stop using these medicines early because your symptoms are better.  You doctor can also prescribe a pill that you only take by mouth once.   If your symptoms are worse or you get vaginal yeast infections often, you may need:   · Medicine for up to 14 days  · Clotrimazole vaginal suppository or fluconazole pill every week to prevent new infections  To help prevent and treat vaginal discharge:   · Keep your genital area clean and dry. Avoid soap and rinse with water only. Sitting in a warm, but not hot, bath may help your symptoms.  · Avoid douching. Although many women feel  if they douche after their period or intercourse, it may worsen vaginal discharge. Douching removes healthy bacteria lining the vagina that protect against infection.  · Eat yogurt with live cultures or take Lactobacillus acidophilus tablets when you are on antibiotics. This may help to prevent a yeast infection.  · Use condoms to avoid catching or spreading other infections.  · Avoid using feminine hygiene sprays, fragrances, or powders in the genital area.  · Avoid wearing tight-fitting pants or shorts, which may cause irritation.  · Wear cotton underwear or cotton-crotch pantyhose. Avoid underwear made of silk or nylon, because they can increase sweating in the genital area, which leads to growth of more yeast.  · Keep your blood sugar level under good control if you have diabetes.  · Avoid wearing wet bathing suits or exercise clothing for long periods of time. Wash sweaty or wet clothes after each use.  [place] (Prognosis)   Most of the time, symptoms go away completely with proper treatment.   Possible Complications   A lot of scratching may cause the skin to crack, making you more likely to get a skin infection.   Repeat infections that occur right after treatment, or a yeast infection that does not respond well to treatment, may be an early sign of diabetes or rarely, HIV.   When to Contact a Medical Professional    Call your provider if:   · This is the first time that you have had symptoms of a vaginal yeast infection.  · You are not sure if you have a yeast infection.  · Your symptoms don't go away after using over-the-counter medicines.  · Your symptoms get worse.  · You develop other symptoms.  · You may have been exposed to an STD.  References   Ashley STEPHENS, Irene DELCID. Infections of the lower genital tract. In: Irene DELCID, Armando RA, Johnnie DM, Nicholas VL, eds. Comprehensive Gynecology. 6th ed. [place], [place]: Connie Bettencourt; 2012:chap 23.   Habif TP. Superficial fungal infections. In: Habif TP, ed. Clinical Dermatology. 6th ed. [place], [place]: [employer]ilv[employer]; 2016:chap 13.   Dada HR, Kaiden DF. Vulvovaginitis. In: Jeremi RM, Jai BF, [facility] JW, Alvarez NF, eds. [place] Textbook of Pediatrics. 20th ed. [place], [place]: Elsevier; 2016:chap 549.   Any CA. Candidiasis. In: Ricky ROMERO, Johnnie BROOKE, eds. Garc[employer]'s [employer]ed[employer] Medicine. 25th ed. [place], [place]: Connie Valderrama; 2016:chap 338.      Review Date: 11/5/2015  Reviewed By: Clara Lucas MD, Fellow [employer]etric[employer] Gy[employer], [facility], [place], [place]. Also reviewed by David Zieve, MD, A, Sabrina Linton, PhD, and the A.D.A.M. Editorial team.            Revision History        User Key Date/Time User Provider Type Action    > [N/A] 04/17/18 08:29 AM Chase De Santiago PA Physician Assistant Addend     [N/A] 04/17/18 08:28 AM Chase De Santiago PA Physician Assistant Addend     [N/A] 04/17/18 08:28 AM Chase De Santiago PA Physician Assistant Addend     [N/A] 04/17/18 08:27 AM Chase De Santiago PA Physician Assistant Sign             (0) understands/communicates without difficulty

## 2018-12-07 PROCEDURE — 97140 MANUAL THERAPY 1/> REGIONS: CPT

## 2018-12-07 PROCEDURE — G8979: CPT | Mod: CI

## 2018-12-07 PROCEDURE — G8980: CPT | Mod: KX,CI

## 2018-12-07 PROCEDURE — 97116 GAIT TRAINING THERAPY: CPT | Mod: KX

## 2018-12-07 PROCEDURE — 97110 THERAPEUTIC EXERCISES: CPT

## 2018-12-07 PROCEDURE — 97112 NEUROMUSCULAR REEDUCATION: CPT

## 2018-12-07 PROCEDURE — G8978: CPT | Mod: CM

## 2018-12-07 PROCEDURE — 97161 PT EVAL LOW COMPLEX 20 MIN: CPT

## 2018-12-21 ENCOUNTER — APPOINTMENT (OUTPATIENT)
Dept: ORTHOPEDIC SURGERY | Facility: CLINIC | Age: 68
End: 2018-12-21
Payer: MEDICARE

## 2018-12-21 PROCEDURE — 73562 X-RAY EXAM OF KNEE 3: CPT | Mod: RT

## 2018-12-21 PROCEDURE — 99213 OFFICE O/P EST LOW 20 MIN: CPT

## 2018-12-21 PROCEDURE — 73560 X-RAY EXAM OF KNEE 1 OR 2: CPT | Mod: LT

## 2019-03-05 NOTE — H&P ADULT - NSHPREVIEWOFSYSTEMS_GEN_ALL_CORE
Patient presents to clinic today for a follow up on illness over the last few months. He states he feels his immune system is low. He has been losing weight because of this. He is also needing a note for work.    Patient declines PHQ and GIGI.     No LMP for male patient.  Medication Reconciliation: complete    Isela Fontaine LPN  3/5/2019 1:56 PM     REVIEW OF SYSTEMS  Constitutional: No fever, No Chills, No fatigue  HEENT: No eye pain, No visual disturbances, No difficulty hearing, No Dysphagia   Pulm: No cough,  No shortness of breath  Cardio: No chest pain, No palpitations  GI:  No abdominal pain, No nausea, No vomiting, No diarrhea, No constipation, No incontinence, Last Bowel Movemement on   : No dysuria, No frequency, No hematuria, No incontinence  Neuro: No headaches, No memory loss, No loss of strength, No numbness, No tremors  Skin: No itching, No rashes, No lesions   Endo: No temperature intolerance  MSK: No joint pain, No joint swelling, No muscle pain, No Neck or back pain  Psych:  No depression, No anxiety    Any Major Surgery within past 100 days? No / Yes     Two or more Falls within past one year?  No / Yes                   One Fall with injury past one year?           No / Yes REVIEW OF SYSTEMS  Constitutional: No fever, No Chills, No fatigue  HEENT: No eye pain, No visual disturbances, No difficulty hearing, No Dysphagia   Pulm: No cough,  No shortness of breath  Cardio: No chest pain, No palpitations  GI:  No abdominal pain, No nausea, No vomiting, No diarrhea, No constipation, No incontinence, Last Bowel 9/9  : No dysuria, No frequency, No hematuria, No incontinence  Neuro: Episode of dizziness and hypotension with SBPs 100s at OSH  Skin: No itching, No rashes, No lesions   Endo: No temperature intolerance  MSK: 10+/10 pain in right knee, difficulty extending and flexing knee   Psych:  No depression, No anxiety    Any Major Surgery within past 100 days? Yes/ Yes     Two or more Falls within past one year?  No / Yes                   One Fall with injury past one year?         Yes / Yes

## 2019-04-05 ENCOUNTER — APPOINTMENT (OUTPATIENT)
Dept: ORTHOPEDIC SURGERY | Facility: CLINIC | Age: 69
End: 2019-04-05
Payer: MEDICARE

## 2019-04-05 PROCEDURE — 73562 X-RAY EXAM OF KNEE 3: CPT | Mod: RT

## 2019-04-05 PROCEDURE — 73560 X-RAY EXAM OF KNEE 1 OR 2: CPT | Mod: LT

## 2019-04-05 PROCEDURE — 99213 OFFICE O/P EST LOW 20 MIN: CPT

## 2019-04-05 RX ORDER — BUPROPION HYDROCHLORIDE 200 MG/1
200 TABLET, FILM COATED, EXTENDED RELEASE ORAL
Qty: 60 | Refills: 0 | Status: ACTIVE | COMMUNITY
Start: 2019-03-21

## 2019-04-05 NOTE — HISTORY OF PRESENT ILLNESS
[de-identified] : 67 y/o female presents for follow up evaluation s/p right revision TKR at 6 months. Patient is doing well.  She states she can walk normally but has some discomfort and difficulty with transfers into a car, and with stairs. She is otherwise doing well and has minimal pain. Patient is very happy with her outcome.  no vomiting

## 2019-04-05 NOTE — PHYSICAL EXAM
[de-identified] : Right Knee\par Inspection: no effusion\par Wounds: healed midline incision\par Alignment: normal.\par Palpation: no specific tenderness on palpation.\par ROM: Active (in degrees) 0-90\par Ligamentous laxity (neg): negative ant. drawer test, negative post. drawer test, stable to varus stress test, stable to valgus stress test,\par Patellofemoral Alignment Test: Q angle-, normal.\par Muscle Test: good quad strength.\par Leg examination: calf is soft and non-tender.  [de-identified] : Left knee xray merchant view taken at the office today demonstrates joint space narrowing and small osteophyte formation consistent with patellofemoral arthritis \par  \par Right knee xrays, AP, lateral, merchant view, taken at the office today demonstrates a revision total knee replacement with long stem prosthesis in satisfactory position and alignment. No evidence of loosening. The patella sits in a central position.

## 2019-04-05 NOTE — DISCUSSION/SUMMARY
[de-identified] : Patient is doing well following their right revision TKR at 6 months and is very happy and feeling improved. I encouraged her to continue with an exercise program, espeically for stairs. They will continue with activities as tolerated. Patient will follow up with x-rays in 6 months.

## 2019-04-05 NOTE — ADDENDUM
[FreeTextEntry1] : This note was written by Melina Black on 04/05/2019 acting as scribe for Dr. Kanu Chavez M.D.\par \par I, Dr. Kanu Chavez M.D., have read and attest that all the information, medical decision making and discharge instructions within are true and accurate.\par

## 2019-09-09 ENCOUNTER — APPOINTMENT (OUTPATIENT)
Dept: UROLOGY | Facility: CLINIC | Age: 69
End: 2019-09-09

## 2019-10-11 ENCOUNTER — APPOINTMENT (OUTPATIENT)
Dept: ORTHOPEDIC SURGERY | Facility: CLINIC | Age: 69
End: 2019-10-11
Payer: MEDICARE

## 2019-10-11 PROCEDURE — 73560 X-RAY EXAM OF KNEE 1 OR 2: CPT | Mod: LT

## 2019-10-11 PROCEDURE — 73564 X-RAY EXAM KNEE 4 OR MORE: CPT | Mod: RT

## 2019-10-11 PROCEDURE — 99213 OFFICE O/P EST LOW 20 MIN: CPT

## 2019-10-11 NOTE — HISTORY OF PRESENT ILLNESS
[de-identified] : 69 y/o female presents for follow up evaluation s/p right revision TKR at 1 year. Patient is doing well. She paige report having some daily pain when walking and which varies. She currently takes Tylenol for her right knee pain because anti-inflammatory medications usually result in a bloody nose. She tried CBD oil with not much improvement. She would like to make sure she is healing well.

## 2019-10-11 NOTE — ADDENDUM
[FreeTextEntry1] : This note was written by Carmelina Abdi on 10/11/2019 acting as scribe for Dr. Kanu Chavez M.D.\par \par I, Dr. Kanu Chavez M.D., have read and attest that all the information, medical decision making and discharge instructions within are true and accurate.\par

## 2019-10-11 NOTE — PHYSICAL EXAM
[de-identified] : Right Knee\par Inspection: no effusion\par Wounds: healed midline incision\par Alignment: normal.\par Palpation: no specific tenderness on palpation.\par ROM: Active (in degrees) 0-90\par Ligamentous laxity (neg): negative ant. drawer test, negative post. drawer test, stable to varus stress test, stable to valgus stress test,\par Patellofemoral Alignment Test: Q angle-, normal.\par Muscle Test: good quad strength.\par Leg examination: calf is soft and non-tender.  [de-identified] : Left knee xray merchant view taken at the office today demonstrates joint space narrowing and small osteophyte formation consistent with patellofemoral arthritis \par  \par Right knee xrays, AP, lateral, merchant view, taken at the office today demonstrates a revision total knee replacement with long stem prosthesis in satisfactory position and alignment. No evidence of loosening. The patella sits in a central position.

## 2020-01-20 NOTE — PATIENT PROFILE ADULT. - TEACHING/LEARNING LEARNING PREFERENCES
PT IRP Treatment    Primary Rehabilitation Diagnosis: Non-traumatic Brain dysfunction, glioblastoma  Expected Discharge Date: 01/21/20(No reconference needed.)  Planned Discharge Destination: Home    SUBJECTIVE: Subjective: Pt agreeable to treatment session, \"I'm feeling pretty good about things. Except for days I'm really tired. Then I am worried about the stairs.\" (01/20/20 1230)  Subjective/Objective Comments: Chart reviewed. Time spent organizing d/c paperwork into binder and reviewing with patient. Additionally, manual w/c delivered and time spent reviewing w/c set up/accessory management with patient. Pt ends session in gym awaiting SLP session.   (01/20/20 1230)    OBJECTIVE:  Precautions  Seizure Precautions: Yes (01/18/20 1400)  Other Precautions: left contraversive pushing with fatigue, shoes and LLE NDT splint on with activity  (01/03/20 0900)  Precautions Comments: left inattention  (01/05/20 1502)    See below for current functional status overview.  See PT flowsheet for full details regarding the PT therapy provided.    ASSESSMENT:   PT treatment session completed with emphasis on transfers, gait training with SBQC, stairs, w/c mobility and d/c education in preparation for d/c home tomorrow. Pt demonstrating good progress as indicated by improved midline with transfers thus allowing progressing to close supervision/steadying assist. Pt still remains limited by left inattention, motor fatigue and still requires up to moderate assist, especially with stairs and ambulation over uneven ground. Continue plan of care with emphasis on family education and review of HEP to assist meet goal of safe d/c home.     PT Identified Barriers to Discharge: fall risk, L side deficits     This patient participated in all scheduled physical therapy time with this therapist today.    EDUCATION:   On this date, education was provided to patient regarding  safe use of equipment, written information on transfers, transfers,  ambulation, stairs, community reentry, pressure relief and fall prevention  The response to education was/were: Verbalizes understanding and Needs reinforcement    PLAN:   Continue skilled PT, including the following Treatment/Interventions: Functional transfer training;Strengthening;Endurance training;Bed mobility;Gait training;Stairs retraining;Safety Education;Neuromuscular re-education (12/26/19 0700)   PT Frequency: 7 days/week (01/20/20 1230), Frequency Comments: 90 min/day, 5 days/week (01/20/20 1230)    Treatment Plan for Next Session: complete CARE, review w/c with family, review d/c binder and exercises, car transfer TUE afternoon   Additional Plan Considerations: include family in all transfers as able, emphasize w/c set up with transfers, left attention with all tasks  Plan Comments: SACHI HEBERT    RECOMMENDATIONS FOR DISCHARGE:  Recommendation for Discharge: PT WI: Home, Home therapy    PT/OT Mobility Equipment for Discharge: w/c delivered 1/20; family obtained transport chair, SBQC  obtained from donation closet  (01/20/20 1230)  PT/OT ADL Equipment for Discharge: Pt wife purchasing ETB. Pt has commode and son installing grab bars by shower and toliet  (01/20/20 0705)      FUNCTIONAL DATA OVERVIEW LAST 24 HOURS  Bed Mobility        Transfers  Transfers  Sit to Stand: Touching/Steadying Assistance (01/20/20 1230)  Stand to Sit: Touching/Steadying Assistance (01/20/20 1230)  Stand Pivot Transfers: Touching/Steadying Assistance (01/20/20 1230)  Car Transfers: Touching/Steadying Assistance (01/20/20 1230)  Assistive Device/: SBQC (01/20/20 1230)  Transfer Comments 1: pt with improved midline thus requiring steadying assist with sit<>stand transfers with moderate cues for LLE alignment, LUE awareness and w/c set up with all transfers requiring increased time due to unfamilarity of new manual w/c. Car transfer with steadying assist for balance requiring use of bilateral UEs for LLE advancement  into car due to weakness and left inattention, cues for repositioning in car. Pt provided written handouts for various transfer types in effort to assist caregivers.  (01/20/20 1230)    Gait  Gait  Gait Assistance: Minimal Assist (Min) (01/20/20 1230)  Assistive Device/: SBQC (01/20/20 1230)  Ambulation Distance (Feet): 150 Feet(50, 25 ft x 2) (01/20/20 1230)  Ambulation Surface: Tile (01/20/20 1230)  Gait Comments 1: light min assist for balance and safety initally, requiring increased assist for timing of right anteriorlateral weight shift with fatigue due to increased left foot drag secondary to left inattention. Cues for SBQC sequencing and reduced distractions to improve attention to task. Reinforced recommendation to avoid ambulation at home unless necessary and keep to 10-25 ft distance.  (01/20/20 1230),      Stairs  Stairs Mobility  Number of Stairs: 12 (01/20/20 1230)  Stair Management Assistance: Moderate Assist (Mod) (01/20/20 1230)  Stair Management Technique: One rail R;Step to pattern;Forwards (01/20/20 1230)  Curbs: Moderate Assist (Mod) (01/20/20 1230)  Ramp: Moderate Assist (Mod) (01/20/20 1230)  Stairs Mobility Comments: right railing, min assist initially for body weight support, balance and safety requiring moderate assist for facilitation of LLE placement and advancement with motor fatigue, cues for hip extension and sequencing with increased time to complete, heavy RUE reliance on railing.  (01/20/20 1230)    Wheelchair Mobility  PT Wheelchair Mobility  Type of Wheelchair: Manual (01/20/20 1230)  Wheelchair Mobility: Modified Independent;Supervision (Supv) (01/20/20 1230)  Distance (ft): 150 Feet(50 ) (01/20/20 1230)  Propelling Method: Right upper;Right lower (01/20/20 1230)  Wheelchair Mobility Comments: pt educated on manual w/c components and removal for transfer set up, time spent applying electrical tape (yellow) to facilitate increased accessiblity secondary to left  inattention; pt propels with right hemisequencing at independent level with straight distances x 150 ft, supervision for weaving around cones x 50 ft secondary to left inattention, worsened with fatigue, cues for LUE maintainence on tray, use of dycem to assist with left hand placement.  (01/20/20 1230)    Balance       Neuromuscular Re-education       Other Therapeutic Interventions  Other Therapeutic Intervention: time spent initiating written HEP and d/c recommendation binder  (01/20/20 1230)     verbal instruction/skill demonstration/individual instruction/written material

## 2020-08-15 NOTE — PATIENT PROFILE ADULT. - PAIN SCALE PREFERRED, PROFILE
[facility] Hospitalist Progress Note     PCP: Pedrito Russo MD    SUBJECTIVE:  No complaints  orthostasis resolved  PT recc JAVON as patient requires 2 assist to stand and pivot to wheelchair  Mostly wheelchair bound    Awaiting insurance approval rehab    Dc 106 107   CO2 30.0 30.0   BUN 9 8   CREATSERUM 0.60 0.51*   CA 8.5 8.7   GLU 96 94       Recent Labs   Lab 08/11/20  1737   ALT 15   AST 17   ALB 3.0*       Recent Labs   Lab 08/11/20  1735   PGLU 93       Recent Labs   Lab 08/11/20  1737   TROP <0.045 were discussed with patient and/or family by bedside.       Thank Kai Herrera MD  [facility] Hospitalist  [phone] numerical [phone]

## 2020-11-30 DIAGNOSIS — M25.551 PAIN IN RIGHT HIP: ICD-10-CM

## 2020-12-01 ENCOUNTER — APPOINTMENT (OUTPATIENT)
Dept: ORTHOPEDIC SURGERY | Facility: CLINIC | Age: 70
End: 2020-12-01
Payer: MEDICARE

## 2020-12-01 VITALS
WEIGHT: 163 LBS | HEART RATE: 76 BPM | HEIGHT: 66 IN | BODY MASS INDEX: 26.2 KG/M2 | SYSTOLIC BLOOD PRESSURE: 183 MMHG | DIASTOLIC BLOOD PRESSURE: 103 MMHG

## 2020-12-01 VITALS — TEMPERATURE: 97.8 F

## 2020-12-01 DIAGNOSIS — M70.61 TROCHANTERIC BURSITIS, RIGHT HIP: ICD-10-CM

## 2020-12-01 PROCEDURE — 99214 OFFICE O/P EST MOD 30 MIN: CPT | Mod: 25

## 2020-12-01 PROCEDURE — 73502 X-RAY EXAM HIP UNI 2-3 VIEWS: CPT | Mod: RT

## 2020-12-01 PROCEDURE — 20610 DRAIN/INJ JOINT/BURSA W/O US: CPT | Mod: RT

## 2020-12-01 NOTE — DISCUSSION/SUMMARY
[de-identified] : This patient is right hip trochanteric bursitis.  The patient is not an appropriate candidate for surgical intervention at this time. An extensive discussion was conducted on the natural history of the disease and the variety of surgical and non-surgical options available to the patient including, but not limited to non-steroidal anti-inflammatory medications, steroid injections, physical therapy, maintenance of ideal body weight, and reduction of activity.  Recommended physical therapy for range of motion strengthening and use of a foam roller.  Been performed a right hip trochanteric bursa injection.  The patient will schedule an appointment as needed.\par \par Informed consent for the right hip trochanteric bursa injection was obtained. All questions were answered. A time out was performed. The right lateral hip was prepped and draped in sterile fashion. Using sterile technique, the right greater trochanter was injection with 2cc of Kenalog and 4cc of 0.25% marcaine using a 21-gauge needle. A sterile dressing was applied. Post injection instructions were reviewed. The patient reported relief of symptoms after the injection. The patient tolerated the procedure well.\par

## 2020-12-01 NOTE — HISTORY OF PRESENT ILLNESS
[de-identified] : This is very nice 69-year-old female experiencing right lateral hip pain, which is severe in intensity.  No groin pain.  The pain substantially limits activities of daily living. Walking tolerance is reduced.  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  She does not take medications for this.  She is not tried physical therapy for the hip although she has tried extensive physical therapy for the right knee.  Does not use a cane or walker.  The patient denies any radiation of the pain to the feet and it is not associated with numbness, tingling, or weakness.

## 2020-12-01 NOTE — PHYSICAL EXAM
[de-identified] : Patient is well nourished, well-developed, in no acute distress, with appropriate mood and affect. The patient is oriented to time, place, and person. Respirations are even and unlabored. Gait evaluation does not reveal a limp. There is no inguinal adenopathy. Examination of the contralateral hip shows normal range of motion, strength, no tenderness, and intact skin. The affected limb is well-perfused and showed 2+ dp/pt pulses, without skin lesions, shows a grossly normal motor and sensory examination. Examination of the hip shows no skin lesions. Hip motion is full and painless from 0-90 degrees extension to flexion, 20 degrees adduction and 20 degrees abduction, and 15 degrees internal and 30 degrees external rotation. Leg lengths are approximately equal. FADIR is negative and CRAIG is negative. Stinchfield test is negative. Both hips are stable and muscle strength is normal with good strength with resisted abduction and adduction. Pedal pulses are palpable.  Tender to palpation of the lateral aspect of the hip.  Have to be [de-identified] : AP and lateral x-rays of the right hip, pelvis, and femur were ordered and taken in the office and demonstrate no evidence of degenerative joint disease of the hip with maintained joint space and no evidence of fractures or other intraarticular pathology.

## 2020-12-17 NOTE — PHYSICAL THERAPY INITIAL EVALUATION ADULT - REFERRING PHYSICIAN, REHAB EVAL
----- Message from Whitley Frederick sent at 12/16/2020  6:42 PM PST -----  Regarding: Prescription Question  Hi ... please send a new script to [facility] for Ozempic 0.5mg once a week ... I have a $200 Benefit from [employer] for diabetic medications...  a Calendar Year ...     I have a 72 hour window on this process ... Christelle from [employer] sent it over yesterday to [facility]...     thank you o< ):o)>  
Priscial Brandon

## 2021-02-22 DIAGNOSIS — M25.562 PAIN IN LEFT KNEE: ICD-10-CM

## 2021-02-24 ENCOUNTER — APPOINTMENT (OUTPATIENT)
Dept: ORTHOPEDIC SURGERY | Facility: CLINIC | Age: 71
End: 2021-02-24
Payer: MEDICARE

## 2021-02-24 VITALS — BODY MASS INDEX: 26.2 KG/M2 | HEIGHT: 66 IN | WEIGHT: 163 LBS

## 2021-02-24 PROCEDURE — 73564 X-RAY EXAM KNEE 4 OR MORE: CPT | Mod: LT

## 2021-02-24 PROCEDURE — 20610 DRAIN/INJ JOINT/BURSA W/O US: CPT | Mod: LT

## 2021-02-24 PROCEDURE — 99213 OFFICE O/P EST LOW 20 MIN: CPT | Mod: 25

## 2021-02-24 PROCEDURE — 73562 X-RAY EXAM OF KNEE 3: CPT | Mod: RT

## 2021-02-24 NOTE — DISCUSSION/SUMMARY
[de-identified] : Patient is doing well following their right revision TKR at 2.5 years.  She is stiff but has been functioning well as this has been a chronic issue. \par \par Discussed at length the nature of the patient’s condition. Their left knee symptoms appear secondary to degenerative arthritis, acute flare up. We reviewed operative and nonoperative treatment. Patient was given a left knee cortisone injection today as detailed above for symptomatic relief. I also suggested home exercises. Will provide a script for Rx Meloxicam 7.5 QD. I stated not to take Advil or Aleve with the Meloxicam.\par \par Patient can continue activities as tolerated. All questions answered, understanding verbalized. Patient in agreement with plan of care. Patient may follow up with X-rays in 6-8 weeks. If she is still symptomatic we will consider an MRI.

## 2021-02-24 NOTE — PHYSICAL EXAM
[de-identified] : Left Knee\par Inspection: no effusion\par Wounds: none\par Alignment: normal.\par Palpation: no specific tenderness on palpation.\par ROM: Active (in degrees): 0- 90 ° with discomfort and crepitus.\par Ligamentous laxity (neg): negative ant. drawer test, negative post. drawer test, stable to varus stress test, stable to valgus stress test,\par Patellofemoral Alignment Test: Q angle-, normal.\par Muscle Test: good quad strength.\par Leg examination: calf is soft and non-tender. \par \par Right Knee\par Inspection: no effusion\par Wounds: healed midline incision\par Alignment: normal.\par Palpation: no specific tenderness on palpation.\par ROM: Active (in degrees) 0-90\par Ligamentous laxity (neg): negative ant. drawer test, negative post. drawer test, stable to varus stress test, stable to valgus stress test,\par Patellofemoral Alignment Test: Q angle-, normal.\par Muscle Test: good quad strength.\par Leg examination: calf is soft and non-tender.  [de-identified] : Left knee xrays, standing AP/Lateral and Merchant films, and 45 degree PA standing view, taken at the office today shows diffuse tricompartmental degenerative arthritis, medial joint space narrowing, marginal osteophytes, sclerosis, patellofemoral joint space narrowing, peripheral osteophytes, superior and inferior pull of the patella, Kellgren and Germán grade 3. 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 No evidence of loosening. The patella sits in a central position.

## 2021-02-24 NOTE — HISTORY OF PRESENT ILLNESS
[de-identified] : RUBI OCONNELL is a 70 year female presents for follow-up evaluation of s/p right revision TKR at 2.5 years. Patient denies any pain at the ride side. She is happy with surgical outcome, no wound complications\par \par She states her left knee is currently symptomatic. She denies any injuries. She states she takes Tylenol prn for pain control with minimal relief. She states she is walking with a limp. She has had previous PT but has not had any injection in left knee. She is interested in this today. She reports abrasion over left shin bumping into .

## 2021-02-24 NOTE — PROCEDURE
[de-identified] : LEFT KNEE CORTISONE INJECTION\par Discussed at length with the patient the planned steroid and lidocaine injection. The risks, benefits, convalescence and alternatives were reviewed. The possible side effects discussed included but were not limited to: pain, swelling, heat and redness. These symptoms are generally mild but if they are extensive then contact the office. Giving pain relievers by mouth such as NSAID’s or Tylenol can generally treat the reactions to steroid and lidocaine. Rare cases of infection have been noted. Rash, hives and itching may occur post injection. If you have muscle pain or cramps, flushing and or swelling of the face, rapid heart beat, nausea, dizziness, fever, chills, headache, difficulty breathing, swelling in the arms or legs, or have a prickly feeling of your skin, contact a health care provider immediately.\par \par Following this discussion, the knee was prepped with betadine and under sterile conditions 5 cc of 2% lidocaine and 1 cc depo-medrol were injected with a 21 gauge needle. The needle was introduced into the joint, aspiration was performed to ensure intra-articular placement and the medication was injected. Upon withdrawal of the needle the site was cleaned with alcohol and a bandaid applied. The patient tolerated the injection well and there were no adverse effects. Post injection instructions included no strenuous activity for 24 hours, cryotherapy and if there are any adverse effects to contact the office."

## 2021-04-07 ENCOUNTER — APPOINTMENT (OUTPATIENT)
Dept: ORTHOPEDIC SURGERY | Facility: CLINIC | Age: 71
End: 2021-04-07
Payer: MEDICARE

## 2021-04-07 VITALS — WEIGHT: 163 LBS | HEIGHT: 66 IN | BODY MASS INDEX: 26.2 KG/M2

## 2021-04-07 PROCEDURE — 73564 X-RAY EXAM KNEE 4 OR MORE: CPT | Mod: LT

## 2021-04-07 PROCEDURE — 99212 OFFICE O/P EST SF 10 MIN: CPT

## 2021-04-07 PROCEDURE — 73560 X-RAY EXAM OF KNEE 1 OR 2: CPT | Mod: RT

## 2021-04-07 NOTE — DISCUSSION/SUMMARY
[de-identified] : Discussed at length the nature of the patient’s condition. Their left knee symptoms appear secondary to degenerative arthritis, acute flare up. We reviewed operative and nonoperative treatment. While I believe he would eventually benefit from a TKR, he is hesitant to have surgery at this time. Therefore, we will continue nonoperatively. \par \par We will seek authorization for left knee Euflexxa injections. Once we receive authorization, we will proceed accordingly. I also suggested home exercises. Will provide a script for Rx Diclofenac.\par \par Refer her to Dr. Conklin and Dr. Payton for evaluation of hip.\par \par Patient can continue activities as tolerated. All questions answered, understanding verbalized. Patient in agreement with plan of care.

## 2021-04-07 NOTE — HISTORY OF PRESENT ILLNESS
[de-identified] : RUBI OCONNELL is a 70 year female presents for follow-up evaluation of s/p right revision TKR at 2.5 years. Patient denies any pain at the ride side. She is happy with surgical outcome, no wound complications She states her left knee arthritis is currently symptomatic. Recieved cortisone and has taken Mobic with minimal relief. Interested in Euflexxa. She also c/o left buttock pain that does not radiate down her leg. Her pain is consistently in the same spot ischial tuberosity.

## 2021-04-07 NOTE — ADDENDUM
[FreeTextEntry1] : This note was written by Jae Branham on 04/07/2021  acting as scribe for Dr. Kanu Chavez M.D.\par \par I, Dr. Kanu Chavez, have read and attest that all the information, medical decision making and discharge instructions within are true and accurate.

## 2021-04-07 NOTE — PHYSICAL EXAM
[de-identified] : Ischial Tenderness on left side and sciatic notch.\par \par Left Knee\par Inspection: trace effusion\par Wounds: none\par Alignment: normal.\par Palpation: no specific tenderness on palpation.\par ROM: Active (in degrees): 0- 90 ° with discomfort and crepitus.\par Ligamentous laxity (neg): negative ant. drawer test, negative post. drawer test, stable to varus stress test, stable to valgus stress test,\par Patellofemoral Alignment Test: Q angle-, normal.\par Muscle Test: good quad strength.\par Leg examination: calf is soft and non-tender. \par \par Right Knee\par Inspection: no effusion\par Wounds: healed midline incision\par Alignment: normal.\par Palpation: no specific tenderness on palpation.\par ROM: Active (in degrees) 0-90\par Ligamentous laxity (neg): negative ant. drawer test, negative post. drawer test, stable to varus stress test, stable to valgus stress test,\par Patellofemoral Alignment Test: Q angle-, normal.\par Muscle Test: good quad strength.\par Leg examination: calf is soft and non-tender.  [de-identified] : Left knee xrays, standing AP/Lateral and Merchant films, and 45 degree PA standing view, taken at the office today shows diffuse tricompartmental degenerative arthritis, medial joint space narrowing, marginal osteophytes, sclerosis, patellofemoral joint space narrowing, peripheral osteophytes, superior and inferior pull of the patella, Kellgren and Germán grade 3. 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 No evidence of loosening. The patella sits in a central position.

## 2021-04-13 ENCOUNTER — APPOINTMENT (OUTPATIENT)
Dept: ORTHOPEDIC SURGERY | Facility: CLINIC | Age: 71
End: 2021-04-13
Payer: MEDICARE

## 2021-04-14 ENCOUNTER — APPOINTMENT (OUTPATIENT)
Dept: ORTHOPEDIC SURGERY | Facility: CLINIC | Age: 71
End: 2021-04-14

## 2021-04-19 ENCOUNTER — APPOINTMENT (OUTPATIENT)
Dept: ORTHOPEDIC SURGERY | Facility: CLINIC | Age: 71
End: 2021-04-19
Payer: MEDICARE

## 2021-04-19 VITALS
HEART RATE: 73 BPM | OXYGEN SATURATION: 98 % | DIASTOLIC BLOOD PRESSURE: 78 MMHG | HEIGHT: 65 IN | SYSTOLIC BLOOD PRESSURE: 157 MMHG | WEIGHT: 165 LBS | BODY MASS INDEX: 27.49 KG/M2

## 2021-04-19 DIAGNOSIS — M25.552 PAIN IN LEFT HIP: ICD-10-CM

## 2021-04-19 PROCEDURE — 72100 X-RAY EXAM L-S SPINE 2/3 VWS: CPT

## 2021-04-19 PROCEDURE — 73502 X-RAY EXAM HIP UNI 2-3 VIEWS: CPT | Mod: LT

## 2021-04-19 PROCEDURE — 99214 OFFICE O/P EST MOD 30 MIN: CPT | Mod: 25

## 2021-04-19 PROCEDURE — 20611 DRAIN/INJ JOINT/BURSA W/US: CPT | Mod: LT

## 2021-04-20 VITALS — HEIGHT: 65 IN | WEIGHT: 165 LBS | BODY MASS INDEX: 27.49 KG/M2

## 2021-04-21 ENCOUNTER — APPOINTMENT (OUTPATIENT)
Dept: ORTHOPEDIC SURGERY | Facility: CLINIC | Age: 71
End: 2021-04-21
Payer: MEDICARE

## 2021-04-21 PROCEDURE — 20610 DRAIN/INJ JOINT/BURSA W/O US: CPT | Mod: LT

## 2021-04-21 NOTE — PROCEDURE
[de-identified] : Euflexxa # 1 Left knee\par Discussed at length with the patient the planned Euflexxa injection. The risks, benefits, convalescence and alternatives were reviewed. The possible side effects discussed included but were not limited to: pain, swelling, heat and redness. There symptoms are generally mild but if they are extensive then contact the office. Giving pain relievers by mouth such as NSAID’s or Tylenol can generally treat the reactions to Euflexxa. Rare cases of infection have been noted. Rash, hives and itching may occur post injection. If you have muscle pain or cramps, flushing and or swelling of the face, rapid heart beat, nausea, dizziness, fever, chills, headache, difficulty breathing, swelling in the arms or legs, or have a prickly feeling of your skin, contact a health care provider immediately.\par \par Following this discussion, the knee was prepped with betadine and under sterile condition the Euflexxa injection was performed with a 22 gauge needle. The needle was introduced into the joint, aspiration was performed to ensure intra-articular placement and the medication was injected. Upon withdrawal of the needle the site was cleaned with alcohol and a bandaid applied. The patient tolerated the injection well and there were no adverse effects. Post injection instructions included no strenuous activity for 24 hours, cryotherapy and if there are any adverse effects to contact the office.\par

## 2021-04-25 VITALS — BODY MASS INDEX: 27.49 KG/M2 | HEIGHT: 65 IN | WEIGHT: 165 LBS

## 2021-04-28 ENCOUNTER — APPOINTMENT (OUTPATIENT)
Dept: ORTHOPEDIC SURGERY | Facility: CLINIC | Age: 71
End: 2021-04-28
Payer: MEDICARE

## 2021-04-28 VITALS — WEIGHT: 160 LBS | HEIGHT: 66 IN | BODY MASS INDEX: 25.71 KG/M2

## 2021-04-28 PROCEDURE — 20610 DRAIN/INJ JOINT/BURSA W/O US: CPT | Mod: LT

## 2021-04-28 NOTE — PROCEDURE
[de-identified] : Euflexxa #2 left knee\par Discussed at length with the patient the planned Euflexxa injection. The risks, benefits, convalescence and alternatives were reviewed. The possible side effects discussed included but were not limited to: pain, swelling, heat and redness. There symptoms are generally mild but if they are extensive then contact the office. Giving pain relievers by mouth such as NSAID’s or Tylenol can generally treat the reactions to Euflexxa. Rare cases of infection have been noted. Rash, hives and itching may occur post injection. If you have muscle pain or cramps, flushing and or swelling of the face, rapid heart beat, nausea, dizziness, fever, chills, headache, difficulty breathing, swelling in the arms or legs, or have a prickly feeling of your skin, contact a health care provider immediately.\par \par Following this discussion, the knee was prepped with betadine and under sterile condition the Euflexxa injection was performed with a 22 gauge needle. The needle was introduced into the joint, aspiration was performed to ensure intra-articular placement and the medication was injected. Upon withdrawal of the needle the site was cleaned with alcohol and a bandaid applied. The patient tolerated the injection well and there were no adverse effects. Post injection instructions included no strenuous activity for 24 hours, cryotherapy and if there are any adverse effects to contact the office.\par

## 2021-05-02 VITALS — BODY MASS INDEX: 25.71 KG/M2 | WEIGHT: 160 LBS | HEIGHT: 66 IN

## 2021-05-05 ENCOUNTER — APPOINTMENT (OUTPATIENT)
Dept: ORTHOPEDIC SURGERY | Facility: CLINIC | Age: 71
End: 2021-05-05
Payer: MEDICARE

## 2021-05-05 PROCEDURE — 20610 DRAIN/INJ JOINT/BURSA W/O US: CPT | Mod: LT

## 2021-05-05 NOTE — PROCEDURE
[de-identified] : Euflexxa # 3 Left knee\par Discussed at length with the patient the planned Euflexxa injection. The risks, benefits, convalescence and alternatives were reviewed. The possible side effects discussed included but were not limited to: pain, swelling, heat and redness. There symptoms are generally mild but if they are extensive then contact the office. Giving pain relievers by mouth such as NSAID’s or Tylenol can generally treat the reactions to Euflexxa. Rare cases of infection have been noted. Rash, hives and itching may occur post injection. If you have muscle pain or cramps, flushing and or swelling of the face, rapid heart beat, nausea, dizziness, fever, chills, headache, difficulty breathing, swelling in the arms or legs, or have a prickly feeling of your skin, contact a health care provider immediately.\par \par Following this discussion, the knee was prepped with betadine and under sterile condition the Euflexxa injection was performed with a 22 gauge needle. The needle was introduced into the joint, aspiration was performed to ensure intra-articular placement and the medication was injected. Upon withdrawal of the needle the site was cleaned with alcohol and a bandaid applied. The patient tolerated the injection well and there were no adverse effects. Post injection instructions included no strenuous activity for 24 hours, cryotherapy and if there are any adverse effects to contact the office.\par

## 2021-06-10 NOTE — H&P PST ADULT - URINARY CATHETER
Anticoagulation Management    Unable to reach Nancy today.    Today's INR result of 3.04 is Therapeutic (goal INR of 2.0-3.0).     Follow up required to discuss recent hospitlzation/clarify dosing. .    Left message to continue current dose of warfarin 2.5 mg tonight.       ACN to follow up    Priscilla Frost RN          Levofloxacin    no

## 2022-01-17 NOTE — H&P ADULT - I WAS PHYSICALLY PRESENT FOR THE KEY PORTIONS OF THE EVALUATION AND MANAGEMENT (E/M) SERVICE PROVIDED.  I AGREE WITH THE ABOVE HISTORY, PHYSICAL, AND PLAN WHICH I HAVE REVIEWED AND EDITED WHERE APPROPRIATE
Abdominal binder has been sent to station # [id number] for staff to apply and fit to pt when ready.   Statement Selected

## 2022-06-13 ENCOUNTER — APPOINTMENT (OUTPATIENT)
Dept: OBGYN | Facility: CLINIC | Age: 72
End: 2022-06-13

## 2022-06-13 VITALS
BODY MASS INDEX: 25.71 KG/M2 | WEIGHT: 160 LBS | HEART RATE: 76 BPM | DIASTOLIC BLOOD PRESSURE: 71 MMHG | HEIGHT: 66 IN | SYSTOLIC BLOOD PRESSURE: 129 MMHG

## 2022-06-13 PROCEDURE — 51798 US URINE CAPACITY MEASURE: CPT

## 2022-06-13 PROCEDURE — 99204 OFFICE O/P NEW MOD 45 MIN: CPT | Mod: 25

## 2022-06-13 PROCEDURE — 64566 NEUROELTRD STIM POST TIBIAL: CPT

## 2022-06-13 RX ORDER — DICLOFENAC SODIUM 75 MG/1
75 TABLET, DELAYED RELEASE ORAL
Qty: 30 | Refills: 2 | Status: DISCONTINUED | COMMUNITY
Start: 2021-04-07 | End: 2022-06-13

## 2022-06-13 RX ORDER — HYDROMORPHONE HYDROCHLORIDE 2 MG/1
2 TABLET ORAL EVERY 4 HOURS
Qty: 60 | Refills: 0 | Status: DISCONTINUED | COMMUNITY
Start: 2018-09-28 | End: 2022-06-13

## 2022-06-13 RX ORDER — OXYCODONE AND ACETAMINOPHEN 7.5; 325 MG/1; MG/1
7.5-325 TABLET ORAL
Qty: 10 | Refills: 0 | Status: DISCONTINUED | COMMUNITY
Start: 2019-03-29 | End: 2022-06-13

## 2022-06-13 RX ORDER — MELOXICAM 7.5 MG/1
7.5 TABLET ORAL DAILY
Qty: 30 | Refills: 1 | Status: DISCONTINUED | COMMUNITY
Start: 2021-02-24 | End: 2022-06-13

## 2022-06-13 RX ORDER — CELECOXIB 200 MG/1
200 CAPSULE ORAL TWICE DAILY
Qty: 60 | Refills: 0 | Status: DISCONTINUED | COMMUNITY
Start: 2018-09-28 | End: 2022-06-13

## 2022-06-13 RX ORDER — CELECOXIB 200 MG/1
200 CAPSULE ORAL
Qty: 60 | Refills: 1 | Status: DISCONTINUED | COMMUNITY
Start: 2018-10-31 | End: 2022-06-13

## 2022-06-13 RX ORDER — HYDROMORPHONE HYDROCHLORIDE 2 MG/1
2 TABLET ORAL
Qty: 60 | Refills: 0 | Status: DISCONTINUED | COMMUNITY
Start: 2018-10-12 | End: 2022-06-13

## 2022-06-13 RX ORDER — CELECOXIB 200 MG/1
200 CAPSULE ORAL
Qty: 60 | Refills: 1 | Status: DISCONTINUED | COMMUNITY
Start: 2019-01-08 | End: 2022-06-13

## 2022-06-13 RX ORDER — AMOXICILLIN 500 MG/1
500 CAPSULE ORAL
Qty: 30 | Refills: 0 | Status: DISCONTINUED | COMMUNITY
Start: 2019-03-29 | End: 2022-06-13

## 2022-06-13 RX ORDER — HYDROMORPHONE HYDROCHLORIDE 2 MG/1
2 TABLET ORAL
Qty: 60 | Refills: 0 | Status: DISCONTINUED | COMMUNITY
Start: 2018-10-31 | End: 2022-06-13

## 2022-06-13 RX ORDER — CELECOXIB 200 MG/1
200 CAPSULE ORAL
Qty: 2 | Refills: 0 | Status: DISCONTINUED | COMMUNITY
Start: 2018-08-10 | End: 2022-06-13

## 2022-06-13 RX ORDER — OXYCODONE HYDROCHLORIDE 10 MG/1
10 TABLET, FILM COATED, EXTENDED RELEASE ORAL
Qty: 20 | Refills: 0 | Status: DISCONTINUED | COMMUNITY
Start: 2018-09-28 | End: 2022-06-13

## 2022-06-13 NOTE — REVIEW OF SYSTEMS
[Eyesight Problems] : eyesight problems [Joint Pain] : joint pain [Joint Swelling] : joint swelling [Depression] : depression [All Other ROS] : all other reviewed systems are negative [FreeTextEntry1] : Patient has had 9 surgeries on her right knee and it is basically not able to bend at this point

## 2022-06-13 NOTE — HISTORY OF PRESENT ILLNESS
[FreeTextEntry1] : Patient presents with several years of urgency frequency every 2 hours occasionally is able to hold up to 3 hours and occasionally has trouble making the 2-hour rex.  She is status post GALO/BSO in the past for menorrhagia according to the patient this was benign and was done at [facility].  She followed up gynecologically with Dr. 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

## 2022-06-13 NOTE — PROCEDURE
[Failed Kegel Exercises] : due to kegel exercises which have failed [Urgency] : urinary urgency [Frequency] : urinary frequency [#1] : treatment #1 [Left Ankle] : left ankle [Adverse Reactions] : no adverse reactions, [Patient Tolerated Treatment] : patient tolerated treatment well [Appropriate Reflexes Elicited] : appropriate reflexes were not elicited [Treatment cycle completed] : the treatment cycle was not completed [FreeTextEntry1] : LEVEL #8

## 2022-06-13 NOTE — PHYSICAL EXAM
[Chaperone Present] : A chaperone was present in the examining room during all aspects of the physical examination [No Acute Distress] : in no acute distress [Well developed] : well developed [Well Nourished] : ~L well nourished [Good Hygeine] : demonstrates good hygeine [Oriented x3] : oriented to person, place, and time [Normal Memory] : ~T memory was ~L unimpaired [Normal Mood/Affect] : mood and affect are normal [Normal Lung Sounds] : the lungs were clear to auscultation [Respirations regular] : ~T respiratory rate was regular [Rate & Rhythm Regular] : ~T heart rate and rhythm were normal [No Edema] : ~T edema was not present [Supple] : ~T the neck demonstrated no ~M decrease in suppleness [Thyroid Normal] : the thyroid ~T showed no abnormalities [Symmetrical] : the neck was ~L symmetrical [Warm and Dry] : was warm and dry to touch [Turgor Normal] : skin turgor ~T was normal [Normal Gait] : gait was normal [No Joint Swelling] : there was no joint swelling [No Clubbing, Cyanosis] : no clubbing or cyanosis of the fingernails [Normal Strength/Tone] : muscle strength and tone were normal [Normal Appearance] : general appearance was normal [Uterine Adnexae] : were not tender and not enlarged [Normal rectal exam] : was normal [Normal] : was normal [None] : no [Vulvar Atrophy] : vulvar atrophy [Labia Majora] : were normal [Labia Minora] : were normal [Atrophy] : atrophy [Aa ____] : Aa [unfilled] [Ba ____] : Ba [unfilled] [C ____] : C [unfilled] [GH ____] : GH [unfilled] [PB ____] : PB [unfilled] [TVL ____] : TVL  [unfilled] [Ap ____] : Ap [unfilled] [Bp ____] : Bp [unfilled] [D ____] : D [unfilled] [FreeTextEntry1] : LEONEL [Mass] : no breast mass [Nipple Discharge] : no nipple discharge [Tender] : no tenderness [Mass (___ Cm)] : no ~M [unfilled] abdominal mass was palpated [Tenderness] : ~T no ~M abdominal tenderness observed [H/Smegaly] : no hepatosplenomegaly [Supraclavicular LAD] : no adenopathy noted in supraclavicular lymph nodes [Axillary LAD] : no adenopathy was noted in axillary nodes [Inguinal LAD] : no adenopathy was noted in the inguinal lymph nodes [Rash/Lesion] : no rash or lesion was noted [Estrogen Effect] : no estrogen effect was observed

## 2022-06-13 NOTE — REASON FOR VISIT
[Initial Visit ___] : an initial visit for [unfilled] [Questionnaire Received] : Patient questionnaire received [Intake Form Reviewed] : Patient intake form with past medical history, surgical history, family history and social history reviewed today [Urine Frequency] : urine frequency [Urinary Urgency] : urinary urgency [Nocturia] : nocturia

## 2022-06-13 NOTE — COUNSELING
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, prior surgery, multiparity, and very decreased or absent pelvic floor contraction ability-\par Treatment options include doing nothing, diet and lifestyle changes, physical therapy, pessary, medications, posterior tibial nerve stimulation or surgery to correct the cystocele.\par \par She chooses diet and lifestyle changes including an elimination diet of artificial sweetener diet Coke diet sodas, coffee, spicy food, artificial sweeteners, chocolate, alcohol and she will add back 1 of these after a 2-week period of abstinence 1 at a time to try to identify her food trigger or liquid trigger\par \par She also chooses physical therapy referral was made to [facility] to help her identify her pelvic floor musculature and hopefully be able to contract her pelvic floor effectively thereby reducing urethrovesical FUNNEL\par \par Additionally she will try a bit of weight loss, void every 2 hours while she is awake, space out her fluid intake to 4-hour maximum an hour with a total of no more than 70 to 80 ounces of total fluid per day\par \par And for now she chooses posterior tibial nerve stimulation in addition to the above.(STARTED 6/13/22 TODAY [facility])\par \par Appropriate referrals were made, literature was provided\par The patient had the opportunity to ask questions which were answered to her apparent satisfaction and 3D and computer modeling were utilized to explain the patient's situation to her\par \par Follow-up appointments were made\par

## 2022-06-13 NOTE — OB HISTORY
[Total Preg ___] : : [unfilled] [Full Term ___] : [unfilled] (full-term) [Abortions ___] : [unfilled] (abortions) [unknown] : the patient is unsure of the date of her LMP

## 2022-06-14 LAB
APPEARANCE: CLEAR
BACTERIA: NEGATIVE
BILIRUBIN URINE: NEGATIVE
BLOOD URINE: NEGATIVE
COLOR: NORMAL
GLUCOSE QUALITATIVE U: NEGATIVE
HYALINE CASTS: 1 /LPF
KETONES URINE: NEGATIVE
LEUKOCYTE ESTERASE URINE: NEGATIVE
MICROSCOPIC-UA: NORMAL
NITRITE URINE: NEGATIVE
PH URINE: 6.5
PROTEIN URINE: NEGATIVE
RED BLOOD CELLS URINE: 1 /HPF
SPECIFIC GRAVITY URINE: 1.01
SQUAMOUS EPITHELIAL CELLS: 0 /HPF
URINE CYTOLOGY: NORMAL
UROBILINOGEN URINE: NORMAL
WHITE BLOOD CELLS URINE: 0 /HPF

## 2022-06-15 ENCOUNTER — NON-APPOINTMENT (OUTPATIENT)
Age: 72
End: 2022-06-15

## 2022-06-15 LAB — BACTERIA UR CULT: NORMAL

## 2022-07-26 DIAGNOSIS — R92.8 OTHER ABNORMAL AND INCONCLUSIVE FINDINGS ON DIAGNOSTIC IMAGING OF BREAST: ICD-10-CM

## 2022-07-27 ENCOUNTER — NON-APPOINTMENT (OUTPATIENT)
Age: 72
End: 2022-07-27

## 2022-07-29 ENCOUNTER — NON-APPOINTMENT (OUTPATIENT)
Age: 72
End: 2022-07-29

## 2022-08-12 ENCOUNTER — APPOINTMENT (OUTPATIENT)
Dept: OBGYN | Facility: CLINIC | Age: 72
End: 2022-08-12

## 2022-08-12 PROCEDURE — 64566 NEUROELTRD STIM POST TIBIAL: CPT

## 2022-08-12 PROCEDURE — 99214 OFFICE O/P EST MOD 30 MIN: CPT | Mod: 25

## 2022-08-12 NOTE — COUNSELING
[FreeTextEntry1] : Lali 71-year-old para 2-0-1-2 presents for routine gynecologic exam status post GALO/BSO for benign disease (menorrhagia)\par Urinalysis culture and cytology sent, postvoid residual is normal-negative\par Mammogram ordered and she will get her old records from [facility]-normal rpt 2023\par Bone density ordered\par Renal ultrasound ordered because of the history of the prior hysterectomy to rule out upper tract disease-normal 2022\par Pap N/A due to prior complete hysterectomy\par \par Regarding her urinary frequency and urgency without actual leakage in all likelihood she has dry OAB 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, prior surgery, multiparity, and very decreased or absent pelvic floor contraction ability-\par Treatment options include doing nothing, diet and lifestyle changes, physical therapy, pessary, medications, posterior tibial nerve stimulation or surgery to correct the cystocele.\par \par She chooses diet and lifestyle changes including an elimination diet of artificial sweetener diet Coke diet sodas, coffee, spicy food, artificial sweeteners, chocolate, alcohol and she will add back 1 of these after a 2-week period of abstinence 1 at a time to try to identify her food trigger or liquid trigger\par \par She also chooses physical therapy referral was made to [facility] to help her identify her pelvic floor musculature and hopefully be able to contract her pelvic floor effectively thereby reducing urethrovesical FUNNEL < udfr-declined this after 2 treatments 'I'd rather leak' and 'too humiliating' etc. \par \par Additionally she will try a bit of weight loss, void every 2 hours while she is awake, space out her fluid intake to 4-hour maximum an hour with a total of no more than 70 to 80 ounces of total fluid per day\par \par And for now she chooses posterior tibial nerve stimulation in addition to the above.(STARTED 6/13/22 [facility] WE[facility])-8/12/22; LEFT ANKLE LEVEL #7 [facility]\par \par Appropriate referrals were made, literature was provided\par The patient had the opportunity to ask questions which were answered to her apparent satisfaction and 3D and computer modeling were utilized to explain the patient's situation to her\par \par Follow-up appointments were made\par

## 2022-08-12 NOTE — HISTORY OF PRESENT ILLNESS
[FreeTextEntry1] : Patient presents with several years of urgency frequency every 2 hours occasionally is able to hold up to 3 hours and occasionally has trouble making the 2-hour rex.  She is status post GALO/BSO in the past for menorrhagia according to the patient this was benign and was done at [facility].  She followed up gynecologically with Dr. 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

## 2022-08-12 NOTE — PHYSICAL EXAM
[Chaperone Present] : A chaperone was present in the examining room during all aspects of the physical examination [No Acute Distress] : in no acute distress [Well developed] : well developed [Well Nourished] : ~L well nourished [Good Hygeine] : demonstrates good hygeine [Oriented x3] : oriented to person, place, and time [Normal Memory] : ~T memory was ~L unimpaired [Normal Mood/Affect] : mood and affect are normal [Normal Lung Sounds] : the lungs were clear to auscultation [Respirations regular] : ~T respiratory rate was regular [Rate & Rhythm Regular] : ~T heart rate and rhythm were normal [No Edema] : ~T edema was not present [Supple] : ~T the neck demonstrated no ~M decrease in suppleness [Thyroid Normal] : the thyroid ~T showed no abnormalities [Symmetrical] : the neck was ~L symmetrical [Warm and Dry] : was warm and dry to touch [Turgor Normal] : skin turgor ~T was normal [Normal Gait] : gait was normal [No Joint Swelling] : there was no joint swelling [No Clubbing, Cyanosis] : no clubbing or cyanosis of the fingernails [Normal Strength/Tone] : muscle strength and tone were normal [Vulvar Atrophy] : vulvar atrophy [Labia Majora] : were normal [Labia Minora] : were normal [Normal Appearance] : general appearance was normal [Atrophy] : atrophy [Aa ____] : Aa [unfilled] [Ba ____] : Ba [unfilled] [C ____] : C [unfilled] [GH ____] : GH [unfilled] [PB ____] : PB [unfilled] [TVL ____] : TVL  [unfilled] [Ap ____] : Ap [unfilled] [Bp ____] : Bp [unfilled] [D ____] : D [unfilled] [Uterine Adnexae] : were not tender and not enlarged [Normal rectal exam] : was normal [Normal] : was normal [None] : no [FreeTextEntry1] : LEONEL [Mass] : no breast mass [Tender] : no tenderness [Nipple Discharge] : no nipple discharge [Mass (___ Cm)] : no ~M [unfilled] abdominal mass was palpated [Tenderness] : ~T no ~M abdominal tenderness observed [H/Smegaly] : no hepatosplenomegaly [Supraclavicular LAD] : no adenopathy noted in supraclavicular lymph nodes [Axillary LAD] : no adenopathy was noted in axillary nodes [Inguinal LAD] : no adenopathy was noted in the inguinal lymph nodes [Rash/Lesion] : no rash or lesion was noted [Estrogen Effect] : no estrogen effect was observed

## 2022-08-12 NOTE — PROCEDURE
[Failed Kegel Exercises] : due to kegel exercises which have failed [Frequency] : urinary frequency [Urgency] : urinary urgency [Left Ankle] : left ankle [Patient Tolerated Treatment] : patient tolerated treatment well [#2] : treatment #2 [Adverse Reactions] : no adverse reactions, [Appropriate Reflexes Elicited] : appropriate reflexes were not elicited [Treatment cycle completed] : the treatment cycle was not completed [FreeTextEntry1] : LEVEL #7

## 2022-08-19 ENCOUNTER — APPOINTMENT (OUTPATIENT)
Dept: OBGYN | Facility: CLINIC | Age: 72
End: 2022-08-19

## 2022-08-19 VITALS — SYSTOLIC BLOOD PRESSURE: 166 MMHG | DIASTOLIC BLOOD PRESSURE: 76 MMHG | HEIGHT: 66 IN | HEART RATE: 73 BPM

## 2022-08-19 PROCEDURE — 99214 OFFICE O/P EST MOD 30 MIN: CPT | Mod: 25

## 2022-08-19 PROCEDURE — 64566 NEUROELTRD STIM POST TIBIAL: CPT

## 2022-08-26 ENCOUNTER — APPOINTMENT (OUTPATIENT)
Dept: OBGYN | Facility: CLINIC | Age: 72
End: 2022-08-26

## 2022-08-26 VITALS
DIASTOLIC BLOOD PRESSURE: 83 MMHG | SYSTOLIC BLOOD PRESSURE: 150 MMHG | HEART RATE: 91 BPM | WEIGHT: 163 LBS | BODY MASS INDEX: 26.2 KG/M2 | HEIGHT: 66 IN

## 2022-08-26 PROCEDURE — 64566 NEUROELTRD STIM POST TIBIAL: CPT

## 2022-08-26 PROCEDURE — 99214 OFFICE O/P EST MOD 30 MIN: CPT | Mod: 25

## 2022-09-09 ENCOUNTER — APPOINTMENT (OUTPATIENT)
Dept: OBGYN | Facility: CLINIC | Age: 72
End: 2022-09-09

## 2022-09-23 ENCOUNTER — NON-APPOINTMENT (OUTPATIENT)
Age: 72
End: 2022-09-23

## 2022-09-23 ENCOUNTER — APPOINTMENT (OUTPATIENT)
Dept: OBGYN | Facility: CLINIC | Age: 72
End: 2022-09-23

## 2022-09-23 VITALS
BODY MASS INDEX: 27 KG/M2 | HEART RATE: 79 BPM | SYSTOLIC BLOOD PRESSURE: 146 MMHG | WEIGHT: 168 LBS | HEIGHT: 66 IN | DIASTOLIC BLOOD PRESSURE: 77 MMHG

## 2022-09-23 PROCEDURE — 64566 NEUROELTRD STIM POST TIBIAL: CPT

## 2022-09-23 PROCEDURE — 99214 OFFICE O/P EST MOD 30 MIN: CPT | Mod: 25

## 2022-10-07 ENCOUNTER — APPOINTMENT (OUTPATIENT)
Dept: OBGYN | Facility: CLINIC | Age: 72
End: 2022-10-07

## 2022-10-07 VITALS
HEIGHT: 66 IN | WEIGHT: 167 LBS | SYSTOLIC BLOOD PRESSURE: 147 MMHG | DIASTOLIC BLOOD PRESSURE: 82 MMHG | HEART RATE: 71 BPM | BODY MASS INDEX: 26.84 KG/M2

## 2022-10-07 PROCEDURE — 99214 OFFICE O/P EST MOD 30 MIN: CPT | Mod: 25

## 2022-10-07 PROCEDURE — 64566 NEUROELTRD STIM POST TIBIAL: CPT

## 2022-10-14 ENCOUNTER — APPOINTMENT (OUTPATIENT)
Dept: OBGYN | Facility: CLINIC | Age: 72
End: 2022-10-14

## 2022-10-14 VITALS
SYSTOLIC BLOOD PRESSURE: 174 MMHG | WEIGHT: 167 LBS | DIASTOLIC BLOOD PRESSURE: 79 MMHG | BODY MASS INDEX: 26.84 KG/M2 | HEIGHT: 66 IN | HEART RATE: 76 BPM

## 2022-10-14 PROCEDURE — 99214 OFFICE O/P EST MOD 30 MIN: CPT | Mod: 25

## 2022-10-14 PROCEDURE — 64566 NEUROELTRD STIM POST TIBIAL: CPT

## 2022-11-04 ENCOUNTER — APPOINTMENT (OUTPATIENT)
Dept: OBGYN | Facility: CLINIC | Age: 72
End: 2022-11-04

## 2022-11-04 VITALS — HEIGHT: 66 IN | HEART RATE: 84 BPM | SYSTOLIC BLOOD PRESSURE: 149 MMHG | DIASTOLIC BLOOD PRESSURE: 81 MMHG

## 2022-11-04 PROCEDURE — 99214 OFFICE O/P EST MOD 30 MIN: CPT | Mod: 25

## 2022-11-04 PROCEDURE — 64566 NEUROELTRD STIM POST TIBIAL: CPT

## 2022-11-11 ENCOUNTER — APPOINTMENT (OUTPATIENT)
Dept: OBGYN | Facility: CLINIC | Age: 72
End: 2022-11-11

## 2022-11-14 ENCOUNTER — APPOINTMENT (OUTPATIENT)
Dept: OBGYN | Facility: CLINIC | Age: 72
End: 2022-11-14

## 2022-11-18 ENCOUNTER — APPOINTMENT (OUTPATIENT)
Dept: OBGYN | Facility: CLINIC | Age: 72
End: 2022-11-18

## 2022-11-21 ENCOUNTER — APPOINTMENT (OUTPATIENT)
Dept: OBGYN | Facility: CLINIC | Age: 72
End: 2022-11-21

## 2022-11-21 PROCEDURE — 64566 NEUROELTRD STIM POST TIBIAL: CPT

## 2022-11-21 PROCEDURE — 99214 OFFICE O/P EST MOD 30 MIN: CPT | Mod: 25

## 2022-11-28 ENCOUNTER — APPOINTMENT (OUTPATIENT)
Dept: OBGYN | Facility: CLINIC | Age: 72
End: 2022-11-28

## 2022-11-28 ENCOUNTER — NON-APPOINTMENT (OUTPATIENT)
Age: 72
End: 2022-11-28

## 2022-11-28 VITALS
SYSTOLIC BLOOD PRESSURE: 154 MMHG | HEIGHT: 66 IN | HEART RATE: 93 BPM | WEIGHT: 168 LBS | BODY MASS INDEX: 27 KG/M2 | DIASTOLIC BLOOD PRESSURE: 74 MMHG

## 2022-11-28 PROCEDURE — 99214 OFFICE O/P EST MOD 30 MIN: CPT | Mod: 25

## 2022-11-28 PROCEDURE — 64566 NEUROELTRD STIM POST TIBIAL: CPT

## 2022-12-02 ENCOUNTER — APPOINTMENT (OUTPATIENT)
Dept: OBGYN | Facility: CLINIC | Age: 72
End: 2022-12-02

## 2022-12-02 ENCOUNTER — NON-APPOINTMENT (OUTPATIENT)
Age: 72
End: 2022-12-02

## 2022-12-05 ENCOUNTER — APPOINTMENT (OUTPATIENT)
Dept: OBGYN | Facility: CLINIC | Age: 72
End: 2022-12-05

## 2022-12-05 ENCOUNTER — NON-APPOINTMENT (OUTPATIENT)
Age: 72
End: 2022-12-05

## 2022-12-07 NOTE — PATIENT PROFILE ADULT. - AS SC BRADEN ACTIVITY
Several attempts made to insert NG tube,  Pt thrashes on cart when tube touches nose   MD informed. (3) walks occasionally

## 2022-12-09 ENCOUNTER — APPOINTMENT (OUTPATIENT)
Dept: OBGYN | Facility: CLINIC | Age: 72
End: 2022-12-09

## 2022-12-09 ENCOUNTER — NON-APPOINTMENT (OUTPATIENT)
Age: 72
End: 2022-12-09

## 2022-12-09 VITALS
SYSTOLIC BLOOD PRESSURE: 187 MMHG | HEIGHT: 66 IN | DIASTOLIC BLOOD PRESSURE: 90 MMHG | BODY MASS INDEX: 26.03 KG/M2 | WEIGHT: 162 LBS

## 2022-12-09 PROCEDURE — 99214 OFFICE O/P EST MOD 30 MIN: CPT | Mod: 25

## 2022-12-09 PROCEDURE — 64566 NEUROELTRD STIM POST TIBIAL: CPT

## 2022-12-16 ENCOUNTER — APPOINTMENT (OUTPATIENT)
Dept: OBGYN | Facility: CLINIC | Age: 72
End: 2022-12-16

## 2022-12-16 ENCOUNTER — NON-APPOINTMENT (OUTPATIENT)
Age: 72
End: 2022-12-16

## 2022-12-16 VITALS
HEIGHT: 66 IN | DIASTOLIC BLOOD PRESSURE: 78 MMHG | SYSTOLIC BLOOD PRESSURE: 171 MMHG | HEART RATE: 80 BPM | BODY MASS INDEX: 25.88 KG/M2 | WEIGHT: 161 LBS

## 2022-12-16 PROCEDURE — 64566 NEUROELTRD STIM POST TIBIAL: CPT

## 2022-12-16 PROCEDURE — 99214 OFFICE O/P EST MOD 30 MIN: CPT | Mod: 25

## 2023-01-13 ENCOUNTER — APPOINTMENT (OUTPATIENT)
Dept: OBGYN | Facility: CLINIC | Age: 73
End: 2023-01-13
Payer: MEDICARE

## 2023-01-13 VITALS
BODY MASS INDEX: 32.59 KG/M2 | DIASTOLIC BLOOD PRESSURE: 84 MMHG | HEART RATE: 81 BPM | SYSTOLIC BLOOD PRESSURE: 151 MMHG | HEIGHT: 60 IN | WEIGHT: 166 LBS

## 2023-01-13 PROCEDURE — 64566 NEUROELTRD STIM POST TIBIAL: CPT

## 2023-01-13 PROCEDURE — 99214 OFFICE O/P EST MOD 30 MIN: CPT | Mod: 25

## 2023-01-13 NOTE — HISTORY OF PRESENT ILLNESS
[FreeTextEntry1] : Patient presents with several years of urgency frequency every 2 hours occasionally is able to hold up to 3 hours and occasionally has trouble making the 2-hour rex.  She is status post GALO/BSO in the past for menorrhagia according to the patient this was benign and was done at [place].  She followed up gynecologically with Dr. 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

## 2023-01-13 NOTE — PROCEDURE
[#10] : treatment #10 [Failed Kegel Exercises] : due to kegel exercises which have failed [Urgency] : urinary urgency [Frequency] : urinary frequency [#9] : treatment #9 [Patient Tolerated Treatment] : patient tolerated treatment well [Appropriate Reflexes Elicited] : appropriate reflexes were not elicited [Treatment cycle completed] : the treatment cycle was not completed [Adverse Reactions] : no adverse reactions, [FreeTextEntry1] : # level 6

## 2023-01-13 NOTE — COUNSELING
[FreeTextEntry1] : Lali 72-year-old para 2-0-1-2 presents for routine gynecologic exam status post GALO/BSO for benign disease (menorrhagia)\par Urinalysis culture and cytology sent, postvoid residual is normal-negative\par Mammogram ordered and she will get her old records from [facility]-normal rpt 2023\par Bone density ordered\par Renal ultrasound ordered because of the history of the prior hysterectomy to rule out upper tract disease-normal 2022\par Pap N/A due to prior complete hysterectomy\par \par Regarding her urinary frequency and urgency without actual leakage in all likelihood she has dry OAB 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, prior surgery, multiparity, and very decreased or absent pelvic floor contraction ability-\par Treatment options include doing nothing, diet and lifestyle changes, physical therapy, pessary, medications, posterior tibial nerve stimulation or surgery to correct the cystocele.\par \par She chooses diet and lifestyle changes including an elimination diet of artificial sweetener diet Coke diet sodas, coffee, spicy food, artificial sweeteners, chocolate, alcohol and she will add back 1 of these after a 2-week period of abstinence 1 at a time to try to identify her food trigger or liquid trigger\par \par She also chooses physical therapy referral was made to [facility] to help her identify her pelvic floor musculature and hopefully be able to contract her pelvic floor effectively thereby reducing urethrovesical FUNNEL < udfr-declined this after 2 treatments 'I'd rather leak' and 'too humiliating' etc. \par \par Additionally she will try a bit of weight loss, void every 2 hours while she is awake, space out her fluid intake to 4-hour maximum an hour with a total of no more than 70 to 80 ounces of total fluid per day\par \par And for now she chooses posterior tibial nerve stimulation in addition to the above.(STARTED 6/13/22 MONICA WELL)-1/13/23 < oab and now can wait 3-4 hours at night between voids-session today [facility]\par \par Appropriate referrals were made, literature was provided\par The patient had the opportunity to ask questions which were answered to her apparent satisfaction and 3D and computer modeling were utilized to explain the patient's situation to her\par \par Follow-up appointments were made\par

## 2023-01-13 NOTE — PHYSICAL EXAM
[Chaperone Present] : A chaperone was present in the examining room during all aspects of the physical examination [No Acute Distress] : in no acute distress [Well developed] : well developed [Well Nourished] : ~L well nourished [Good Hygeine] : demonstrates good hygeine [Oriented x3] : oriented to person, place, and time [Normal Memory] : ~T memory was ~L unimpaired [Normal Mood/Affect] : mood and affect are normal [Normal Lung Sounds] : the lungs were clear to auscultation [Respirations regular] : ~T respiratory rate was regular [Rate & Rhythm Regular] : ~T heart rate and rhythm were normal [No Edema] : ~T edema was not present [Supple] : ~T the neck demonstrated no ~M decrease in suppleness [Thyroid Normal] : the thyroid ~T showed no abnormalities [Symmetrical] : the neck was ~L symmetrical [Warm and Dry] : was warm and dry to touch [Turgor Normal] : skin turgor ~T was normal [Normal Gait] : gait was normal [No Joint Swelling] : there was no joint swelling [No Clubbing, Cyanosis] : no clubbing or cyanosis of the fingernails [Normal Strength/Tone] : muscle strength and tone were normal [Vulvar Atrophy] : vulvar atrophy [Labia Majora] : were normal [Labia Minora] : were normal [Normal Appearance] : general appearance was normal [Atrophy] : atrophy [Aa ____] : Aa [unfilled] [Ba ____] : Ba [unfilled] [C ____] : C [unfilled] [GH ____] : GH [unfilled] [PB ____] : PB [unfilled] [TVL ____] : TVL  [unfilled] [Ap ____] : Ap [unfilled] [Bp ____] : Bp [unfilled] [D ____] : D [unfilled] [] : I [Uterine Adnexae] : were not tender and not enlarged [Normal rectal exam] : was normal [Normal] : was normal [None] : no [FreeTextEntry1] : zamzam [Mass] : no breast mass [Tender] : no tenderness [Nipple Discharge] : no nipple discharge [Mass (___ Cm)] : no ~M [unfilled] abdominal mass was palpated [Tenderness] : ~T no ~M abdominal tenderness observed [H/Smegaly] : no hepatosplenomegaly [Supraclavicular LAD] : no adenopathy noted in supraclavicular lymph nodes [Axillary LAD] : no adenopathy was noted in axillary nodes [Inguinal LAD] : no adenopathy was noted in the inguinal lymph nodes [Rash/Lesion] : no rash or lesion was noted [Estrogen Effect] : no estrogen effect was observed

## 2023-01-13 NOTE — REASON FOR VISIT
[Questionnaire Received] : Patient questionnaire received [Intake Form Reviewed] : Patient intake form with past medical history, surgical history, family history and social history reviewed today [Urine Frequency] : urine frequency [Urinary Urgency] : urinary urgency [Nocturia] : nocturia [Follow-Up Visit_____] : a follow-up visit for [unfilled] [FreeTextEntry1] : oab < with ptns rxs no leakage at present

## 2023-01-14 LAB
APPEARANCE: CLEAR
BACTERIA: NEGATIVE
BILIRUBIN URINE: NEGATIVE
BLOOD URINE: NEGATIVE
COLOR: NORMAL
GLUCOSE QUALITATIVE U: NEGATIVE
HYALINE CASTS: 1 /LPF
KETONES URINE: NEGATIVE
LEUKOCYTE ESTERASE URINE: NEGATIVE
MICROSCOPIC-UA: NORMAL
NITRITE URINE: NEGATIVE
PH URINE: 5.5
PROTEIN URINE: NEGATIVE
RED BLOOD CELLS URINE: 1 /HPF
SPECIFIC GRAVITY URINE: 1.03
SQUAMOUS EPITHELIAL CELLS: 1 /HPF
UROBILINOGEN URINE: NORMAL
WHITE BLOOD CELLS URINE: 1 /HPF

## 2023-01-15 LAB — BACTERIA UR CULT: NORMAL

## 2023-01-16 ENCOUNTER — NON-APPOINTMENT (OUTPATIENT)
Age: 73
End: 2023-01-16

## 2023-01-19 LAB — URINE CYTOLOGY: NORMAL

## 2023-02-03 ENCOUNTER — APPOINTMENT (OUTPATIENT)
Dept: ORTHOPEDIC SURGERY | Facility: CLINIC | Age: 73
End: 2023-02-03
Payer: MEDICARE

## 2023-02-03 VITALS — WEIGHT: 160 LBS | HEIGHT: 60 IN | BODY MASS INDEX: 31.41 KG/M2

## 2023-02-03 DIAGNOSIS — M17.12 UNILATERAL PRIMARY OSTEOARTHRITIS, LEFT KNEE: ICD-10-CM

## 2023-02-03 PROCEDURE — 73562 X-RAY EXAM OF KNEE 3: CPT | Mod: RT

## 2023-02-03 PROCEDURE — 20610 DRAIN/INJ JOINT/BURSA W/O US: CPT | Mod: LT

## 2023-02-03 PROCEDURE — 99213 OFFICE O/P EST LOW 20 MIN: CPT | Mod: 25

## 2023-02-03 PROCEDURE — 73564 X-RAY EXAM KNEE 4 OR MORE: CPT | Mod: LT

## 2023-02-03 NOTE — DISCUSSION/SUMMARY
[de-identified] : Their LEFT knee symptoms appear secondary to degenerative arthritis and has an acute exacerbation. We reviewed operative and nonoperative treatment. We will continue nonoperatively. We will seek authorization for LEFT knee viscosupplementation injections. Once we receive authorization, we will proceed accordingly. In the interim, patient was given a LEFT knee cortisone injection today as detailed above for symptomatic relief. I also suggested a home exercise program with pool therapy, weight loss, and Advil or Aleve prn. They can continue activities as tolerated. \par \par The patient's RIGHT revision TKR is still stiff. I do not believe further intervention is warranted. Patient can still modify her activities, though there are significant limitations.\par \par Patient can continue activities as tolerated. All questions answered, understanding verbalized. Patient in agreement with plan of care.

## 2023-02-03 NOTE — PROCEDURE
[de-identified] : LEFT KNEE CORTISONE INJECTION\par Discussed at length with the patient the planned steroid and lidocaine injection. The risks, benefits, convalescence and alternatives were reviewed. The possible side effects discussed included but were not limited to: pain, swelling, heat and redness. These symptoms are generally mild but if they are extensive then contact the office. Giving pain relievers by mouth such as NSAID’s or Tylenol can generally treat the reactions to steroid and lidocaine. Rare cases of infection have been noted. Rash, hives and itching may occur post injection. If you have muscle pain or cramps, flushing and or swelling of the face, rapid heart beat, nausea, dizziness, fever, chills, headache, difficulty breathing, swelling in the arms or legs, or have a prickly feeling of your skin, contact a health care provider immediately.\par \par Following this discussion, the knee was prepped with betadine and under sterile conditions 5 cc of 2% lidocaine and 1 cc depo-medrol (40mg) were injected with a 21 gauge needle. The needle was introduced into the joint, aspiration was performed to ensure intra-articular placement and the medication was injected. Upon withdrawal of the needle the site was cleaned with alcohol and a bandaid applied. The patient tolerated the injection well and there were no adverse effects. Post injection instructions included no strenuous activity for 24 hours, cryotherapy and if there are any adverse effects to contact the office.

## 2023-02-03 NOTE — ADDENDUM
[FreeTextEntry1] : This note was written by Thomas Hull on 02/03/2023 acting as scribe for Dr. Kanu Chavez M.D.\par \par I, Dr. Kanu Chavez, have read and attest that all the information, medical decision making and discharge instructions within are true and accurate.\par \par This note was written by UBALDO REES on 02/03/2023 acting as scribe for Dr. Kanu Chavez M.D.\par \par I, Dr. Kanu Chavez, have read and attest that all the information, medical decision making and discharge instructions within are true and accurate.

## 2023-02-03 NOTE — HISTORY OF PRESENT ILLNESS
[de-identified] : RUBI OCONNELL 72 year old female presents for follow-up evaluation of B/L knees. She had a RIGHT knee revision done in September 2018 which is doing okay. She always had limited ROM in RIGHT knee and this has not improved post surgery. In regard to her LEFT knee, she has been undergoing a nonoperative treatment approach with gel injections last received in 2021. She responded well to the injections. In the last couple of months, she has been having increased pain on the lateral joint. Ibuprofen and Tylenol are providing some relief. She tries to stay active and takes the dog out for a walk 2 times a day.

## 2023-02-03 NOTE — PHYSICAL EXAM
[de-identified] : General appearance: well nourished and hydrated, pleasant, alert and oriented x 3, cooperative.\par HEENT: Normocephalic, EOM intact, Nasal septum midline, Oral cavity clear, External auditory canal clear.\par Cardiovascular: no apparent abnormalities, no lower leg edema, no varicosities, pedal pulses are palpable.\par Lymphatics Lymph nodes: none palpated, Lymphedema: not present.\par Neurologic: sensation is normal, no muscle weakness in upper or lower extremities, patella tendon reflexes intact .\par Dermatologic no apparent skin lesions, moist, warm, no rash.\par Spine:cervical spine appears normal and moves freely, thoracic spine appears normal and moves freely, lumbosacral spine appears normal and moves freely.\par Gait: nonantalgic. \par \par Left Knee\par Inspection: trace effusion\par Wounds: none\par Alignment: normal.\par Palpation: medial tenderness on palpation.\par ROM: Active (in degrees): 0- 130 ° with crepitus.\par Ligamentous laxity (neg): negative ant. drawer test, negative post. drawer test, stable to varus stress test, stable to valgus stress test,\par Patellofemoral Alignment Test: Q angle-, normal.\par Muscle Test: good quad strength.\par Leg examination: calf is soft and non-tender. \par \par Right Knee\par Inspection: mild diffuse soft tissue swelling \par Wounds: healed midline incision\par Alignment: normal.\par Palpation: no specific tenderness on palpation.\par ROM: Active (in degrees) 0-80\par Ligamentous laxity (neg): negative ant. drawer test, negative post. drawer test, stable to varus stress test, stable to valgus stress test,\par Patellofemoral Alignment Test: Q angle-, normal.\par Muscle Test: good quad strength.\par Leg examination: calf is soft and non-tender.  [de-identified] : RIGHT knee x-ray, AP, lateral, merchant view taken at the office today demonstrates a revision in satisfactory position and alignment. No evidence of loosening. The patella sits in a central position.\par \par LEFT knee x-rays, standing AP/Lateral and Merchant films, and 45 degree PA standing view, taken at the office today shows degenerative arthritis, medial joint space narrowing especially on Morales view, tractions per superior pole, Kellgren and Germán grade 2 - 3

## 2023-02-09 VITALS — HEIGHT: 60 IN | BODY MASS INDEX: 31.41 KG/M2 | WEIGHT: 160 LBS

## 2023-02-09 NOTE — PROCEDURE
[de-identified] : Euflexxa # 1 left knee\par Discussed at length with the patient the planned Euflexxa injection. The risks, benefits, convalescence and alternatives were reviewed. The possible side effects discussed included but were not limited to: pain, swelling, heat and redness. There symptoms are generally mild but if they are extensive then contact the office. Giving pain relievers by mouth such as NSAID’s or Tylenol can generally treat the reactions to Euflexxa. Rare cases of infection have been noted. Rash, hives and itching may occur post injection. If you have muscle pain or cramps, flushing and or swelling of the face, rapid heart beat, nausea, dizziness, fever, chills, headache, difficulty breathing, swelling in the arms or legs, or have a prickly feeling of your skin, contact a health care provider immediately.\par \par Following this discussion, the knee was prepped with betadine and under sterile condition the Euflexxa injection was performed with a 22 gauge needle. The needle was introduced into the joint, aspiration was performed to ensure intra-articular placement and the medication was injected. Upon withdrawal of the needle the site was cleaned with alcohol and a bandaid applied. The patient tolerated the injection well and there were no adverse effects. Post injection instructions included no strenuous activity for 24 hours, cryotherapy and if there are any adverse effects to contact the office.\par

## 2023-02-10 ENCOUNTER — APPOINTMENT (OUTPATIENT)
Dept: ORTHOPEDIC SURGERY | Facility: CLINIC | Age: 73
End: 2023-02-10
Payer: MEDICARE

## 2023-02-10 PROCEDURE — 20610 DRAIN/INJ JOINT/BURSA W/O US: CPT | Mod: LT

## 2023-02-16 VITALS — BODY MASS INDEX: 31.41 KG/M2 | HEIGHT: 60 IN | WEIGHT: 160 LBS

## 2023-02-16 NOTE — PROCEDURE
[de-identified] : Euflexxa # 2 Left Knee\par Discussed at length with the patient the planned Euflexxa injection. The risks, benefits, convalescence and alternatives were reviewed. The possible side effects discussed included but were not limited to: pain, swelling, heat and redness. There symptoms are generally mild but if they are extensive then contact the office. Giving pain relievers by mouth such as NSAID’s or Tylenol can generally treat the reactions to Euflexxa. Rare cases of infection have been noted. Rash, hives and itching may occur post injection. If you have muscle pain or cramps, flushing and or swelling of the face, rapid heart beat, nausea, dizziness, fever, chills, headache, difficulty breathing, swelling in the arms or legs, or have a prickly feeling of your skin, contact a health care provider immediately.\par \par Following this discussion, the knee was prepped with betadine and under sterile condition the Euflexxa injection was performed with a 22 gauge needle. The needle was introduced into the joint, aspiration was performed to ensure intra-articular placement and the medication was injected. Upon withdrawal of the needle the site was cleaned with alcohol and a bandaid applied. The patient tolerated the injection well and there were no adverse effects. Post injection instructions included no strenuous activity for 24 hours, cryotherapy and if there are any adverse effects to contact the office.\par

## 2023-02-17 ENCOUNTER — APPOINTMENT (OUTPATIENT)
Dept: ORTHOPEDIC SURGERY | Facility: CLINIC | Age: 73
End: 2023-02-17
Payer: MEDICARE

## 2023-02-17 PROCEDURE — 20610 DRAIN/INJ JOINT/BURSA W/O US: CPT | Mod: LT

## 2023-02-23 VITALS — HEIGHT: 60 IN | BODY MASS INDEX: 31.41 KG/M2 | WEIGHT: 160 LBS

## 2023-02-24 ENCOUNTER — APPOINTMENT (OUTPATIENT)
Dept: ORTHOPEDIC SURGERY | Facility: CLINIC | Age: 73
End: 2023-02-24
Payer: MEDICARE

## 2023-02-24 DIAGNOSIS — M17.12 UNILATERAL PRIMARY OSTEOARTHRITIS, LEFT KNEE: ICD-10-CM

## 2023-02-24 PROCEDURE — 20610 DRAIN/INJ JOINT/BURSA W/O US: CPT | Mod: LT

## 2023-02-24 NOTE — PROCEDURE
[de-identified] : Euflexxa #3 Left knee\par Discussed at length with the patient the planned Euflexxa injection. The risks, benefits, convalescence and alternatives were reviewed. The possible side effects discussed included but were not limited to: pain, swelling, heat and redness. There symptoms are generally mild but if they are extensive then contact the office. Giving pain relievers by mouth such as NSAID’s or Tylenol can generally treat the reactions to Euflexxa. Rare cases of infection have been noted. Rash, hives and itching may occur post injection. If you have muscle pain or cramps, flushing and or swelling of the face, rapid heart beat, nausea, dizziness, fever, chills, headache, difficulty breathing, swelling in the arms or legs, or have a prickly feeling of your skin, contact a health care provider immediately.\par \par Following this discussion, the knee was prepped with betadine and under sterile condition the Euflexxa injection was performed with a 22 gauge needle. The needle was introduced into the joint, aspiration was performed to ensure intra-articular placement and the medication was injected. Upon withdrawal of the needle the site was cleaned with alcohol and a bandaid applied. The patient tolerated the injection well and there were no adverse effects. Post injection instructions included no strenuous activity for 24 hours, cryotherapy and if there are any adverse effects to contact the office.\par

## 2023-03-27 ENCOUNTER — APPOINTMENT (OUTPATIENT)
Dept: OBGYN | Facility: CLINIC | Age: 73
End: 2023-03-27
Payer: MEDICARE

## 2023-03-27 VITALS
HEART RATE: 88 BPM | SYSTOLIC BLOOD PRESSURE: 136 MMHG | HEIGHT: 66 IN | BODY MASS INDEX: 25.71 KG/M2 | WEIGHT: 160 LBS | DIASTOLIC BLOOD PRESSURE: 76 MMHG

## 2023-03-27 PROCEDURE — 64566 NEUROELTRD STIM POST TIBIAL: CPT

## 2023-03-27 PROCEDURE — 99214 OFFICE O/P EST MOD 30 MIN: CPT | Mod: 25

## 2023-03-27 NOTE — HISTORY OF PRESENT ILLNESS
[FreeTextEntry1] : Patient presents with several years of urgency frequency every 2 hours occasionally is able to hold up to 3 hours and occasionally has trouble making the 2-hour rex.  She is status post GALO/BSO in the past for menorrhagia according to the patient this was benign and was done at [facility].  She followed up gynecologically with Dr. Garcia but has not had a GYN exam evaluation or mammogram in 2 years since 2020.\par \par She does not have any leakage per se or prolapse symptoms and she does have nocturia up to 3 times per night.\par \par She is maintained on Wellbutrin and sees her psychiatrist regularly for years\par \par Mobility is impinged by a nonfunctional right knee that does not bend she has according to the patient has had 8 orthopedic surgeries some of them through [facility]\par \par Presents today for urogynecologic and gynecologic evaluation\par \par Patient admits to drinking diet soda coffee plenty of fluid including artificial sweeteners with aspartame

## 2023-03-27 NOTE — PHYSICAL EXAM
[Chaperone Present] : A chaperone was present in the examining room during all aspects of the physical examination [No Acute Distress] : in no acute distress [Well developed] : well developed [Well Nourished] : ~L well nourished [Good Hygeine] : demonstrates good hygeine [Oriented x3] : oriented to person, place, and time [Normal Memory] : ~T memory was ~L unimpaired [Normal Mood/Affect] : mood and affect are normal [Normal Lung Sounds] : the lungs were clear to auscultation [Respirations regular] : ~T respiratory rate was regular [Rate & Rhythm Regular] : ~T heart rate and rhythm were normal [No Edema] : ~T edema was not present [Supple] : ~T the neck demonstrated no ~M decrease in suppleness [Thyroid Normal] : the thyroid ~T showed no abnormalities [Symmetrical] : the neck was ~L symmetrical [Warm and Dry] : was warm and dry to touch [Turgor Normal] : skin turgor ~T was normal [Normal Gait] : gait was normal [No Joint Swelling] : there was no joint swelling [No Clubbing, Cyanosis] : no clubbing or cyanosis of the fingernails [Normal Strength/Tone] : muscle strength and tone were normal [Vulvar Atrophy] : vulvar atrophy [Labia Majora] : were normal [Labia Minora] : were normal [Normal Appearance] : general appearance was normal [Atrophy] : atrophy [Aa ____] : Aa [unfilled] [Ba ____] : Ba [unfilled] [C ____] : C [unfilled] [GH ____] : GH [unfilled] [PB ____] : PB [unfilled] [TVL ____] : TVL  [unfilled] [Ap ____] : Ap [unfilled] [Bp ____] : Bp [unfilled] [D ____] : D [unfilled] [] : I [Uterine Adnexae] : were not tender and not enlarged [Normal rectal exam] : was normal [Normal] : was normal [None] : no [FreeTextEntry1] : spencer [Mass] : no breast mass [Tender] : no tenderness [Nipple Discharge] : no nipple discharge [Mass (___ Cm)] : no ~M [unfilled] abdominal mass was palpated [Tenderness] : ~T no ~M abdominal tenderness observed [H/Smegaly] : no hepatosplenomegaly [Supraclavicular LAD] : no adenopathy noted in supraclavicular lymph nodes [Axillary LAD] : no adenopathy was noted in axillary nodes [Inguinal LAD] : no adenopathy was noted in the inguinal lymph nodes [Rash/Lesion] : no rash or lesion was noted [Estrogen Effect] : no estrogen effect was observed

## 2023-03-27 NOTE — PROCEDURE
[#9] : treatment #9 [Failed Kegel Exercises] : due to kegel exercises which have failed [Urgency] : urinary urgency [Frequency] : urinary frequency [Left Ankle] : left ankle [Patient Tolerated Treatment] : patient tolerated treatment well [Appropriate Reflexes Elicited] : appropriate reflexes were not elicited [Treatment cycle completed] : the treatment cycle was not completed [Adverse Reactions] : no adverse reactions, [FreeTextEntry1] : # level 3

## 2023-03-27 NOTE — COUNSELING
[FreeTextEntry1] : UPDATE 3/27/2023\par Lali 71-year-old para 2-0-1-2 presents for RX OAB FOLLOW UP post GALO/BSO for benign disease (menorrhagia)\par Urinalysis culture and cytology sent, postvoid residual is normal\par Mammogram ordered and she will get her old records from [facility]-normal rpt 2023\par Bone density ordered\par Renal ultrasound ordered because of the history of the prior hysterectomy to rule out upper tract disease-normal 2022 RPT AS CLINICALLY INDICATED\par Pap N/A due to prior complete hysterectomy\par \par Regarding her urinary frequency and urgency without actual leakage in all likelihood she has dry OAB 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, prior surgery, multiparity, and very decreased or absent pelvic floor contraction ability-\par Treatment options include doing nothing, diet and lifestyle changes, physical therapy, pessary, medications, posterior tibial nerve stimulation or surgery to correct the cystocele.\par \par She chooses diet and lifestyle changes including an elimination diet of artificial sweetener diet Coke diet sodas, coffee, spicy food, artificial sweeteners, chocolate, alcohol and she will add back 1 of these after a 2-week period of abstinence 1 at a time to try to identify her food trigger or liquid trigger\par \par She also chooses physical therapy referral was made to [facility] to help her identify her pelvic floor musculature and hopefully be able to contract her pelvic floor effectively thereby reducing urethrovesical FUNNEL < udfr-declined this after 2 treatments 'I'd rather leak' and 'too humiliating' etc. \par \par Additionally she will try a bit of weight loss, void every 2 hours while she is awake, space out her fluid intake to 4-hour maximum an hour with a total of no more than 70 to 80 ounces of total fluid per day\par \par And for now she chooses posterior tibial nerve stimulation in addition to the above.(STARTED 6/13/22 [facility] WELL)-3/27/23-_< oab and now can wait 3-4 hours at night between voids-session today [facility] (finds if she waits > 4 -5 weeks >oab, will come in monthly for boosters now.\par \par Appropriate referrals were made, literature was provided\par The patient had the opportunity to ask questions which were answered to her apparent satisfaction and 3D and computer modeling were utilized to explain the patient's situation to her\par \par Follow-up appointments were made and pex at one year rex/annual\par JMR\par

## 2023-03-27 NOTE — REASON FOR VISIT
[Initial Visit ___] : an initial visit for [unfilled] [Questionnaire Received] : Patient questionnaire received [Intake Form Reviewed] : Patient intake form with past medical history, surgical history, family history and social history reviewed today [Urine Frequency] : urine frequency [Urinary Urgency] : urinary urgency [Nocturia] : nocturia [FreeTextEntry1] : oab < with ptns rxs no leakage at present

## 2023-03-28 LAB
APPEARANCE: CLEAR
BACTERIA: NEGATIVE
BILIRUBIN URINE: NEGATIVE
BLOOD URINE: NEGATIVE
COLOR: NORMAL
GLUCOSE QUALITATIVE U: NEGATIVE
HYALINE CASTS: 1 /LPF
KETONES URINE: NEGATIVE
LEUKOCYTE ESTERASE URINE: NEGATIVE
MICROSCOPIC-UA: NORMAL
NITRITE URINE: NEGATIVE
PH URINE: 6
PROTEIN URINE: NEGATIVE
RED BLOOD CELLS URINE: 3 /HPF
SPECIFIC GRAVITY URINE: 1.02
SQUAMOUS EPITHELIAL CELLS: 1 /HPF
URINE CYTOLOGY: NORMAL
UROBILINOGEN URINE: NORMAL
WHITE BLOOD CELLS URINE: 1 /HPF

## 2023-03-29 LAB — BACTERIA UR CULT: NORMAL

## 2023-03-31 ENCOUNTER — NON-APPOINTMENT (OUTPATIENT)
Age: 73
End: 2023-03-31

## 2023-04-03 ENCOUNTER — NON-APPOINTMENT (OUTPATIENT)
Age: 73
End: 2023-04-03

## 2023-04-24 ENCOUNTER — APPOINTMENT (OUTPATIENT)
Dept: OBGYN | Facility: CLINIC | Age: 73
End: 2023-04-24
Payer: MEDICARE

## 2023-04-24 PROCEDURE — 64566 NEUROELTRD STIM POST TIBIAL: CPT

## 2023-04-24 PROCEDURE — 99214 OFFICE O/P EST MOD 30 MIN: CPT | Mod: 25

## 2023-05-15 ENCOUNTER — NON-APPOINTMENT (OUTPATIENT)
Age: 73
End: 2023-05-15

## 2023-05-15 ENCOUNTER — APPOINTMENT (OUTPATIENT)
Dept: OBGYN | Facility: CLINIC | Age: 73
End: 2023-05-15
Payer: MEDICARE

## 2023-05-15 ENCOUNTER — APPOINTMENT (OUTPATIENT)
Dept: ORTHOPEDIC SURGERY | Facility: CLINIC | Age: 73
End: 2023-05-15
Payer: MEDICARE

## 2023-05-15 VITALS
HEART RATE: 75 BPM | SYSTOLIC BLOOD PRESSURE: 164 MMHG | BODY MASS INDEX: 27.32 KG/M2 | DIASTOLIC BLOOD PRESSURE: 83 MMHG | WEIGHT: 164 LBS | HEIGHT: 65 IN

## 2023-05-15 VITALS — WEIGHT: 160 LBS | BODY MASS INDEX: 26.66 KG/M2 | HEIGHT: 65 IN

## 2023-05-15 DIAGNOSIS — M17.0 BILATERAL PRIMARY OSTEOARTHRITIS OF KNEE: ICD-10-CM

## 2023-05-15 DIAGNOSIS — Z96.651 PRESENCE OF RIGHT ARTIFICIAL KNEE JOINT: ICD-10-CM

## 2023-05-15 PROCEDURE — 99213 OFFICE O/P EST LOW 20 MIN: CPT

## 2023-05-15 PROCEDURE — 64566 NEUROELTRD STIM POST TIBIAL: CPT

## 2023-05-15 PROCEDURE — 99214 OFFICE O/P EST MOD 30 MIN: CPT | Mod: 25

## 2023-05-15 PROCEDURE — 73562 X-RAY EXAM OF KNEE 3: CPT | Mod: RT

## 2023-05-15 RX ORDER — DICLOFENAC SODIUM 75 MG/1
75 TABLET, DELAYED RELEASE ORAL
Qty: 60 | Refills: 2 | Status: ACTIVE | COMMUNITY
Start: 2023-05-15 | End: 1900-01-01

## 2023-05-15 NOTE — ADDENDUM
[FreeTextEntry1] : This note was written by Thomas Hull on 05/15/2023 acting as scribe for Dr. Kanu Chavez M.D.\par \par I, Dr. Kanu Chavez, have read and attest that all the information, medical decision making and discharge instructions within are true and accurate.\par \par This note was written by UBALDO REES on 05/15/2023 acting as scribe for Dr. Kanu Chavez M.D.\par \par I, Dr. Kanu Chavez, have read and attest that all the information, medical decision making and discharge instructions within are true and accurate.

## 2023-05-15 NOTE — PHYSICAL EXAM
[de-identified] : General appearance: well nourished and hydrated, pleasant, alert and oriented x 3, cooperative.\par HEENT: Normocephalic, EOM intact, Nasal septum midline, Oral cavity clear, External auditory canal clear.\par Cardiovascular: no apparent abnormalities, no lower leg edema, no varicosities, pedal pulses are palpable.\par Lymphatics Lymph nodes: none palpated, Lymphedema: not present.\par Neurologic: sensation is normal, no muscle weakness in upper or lower extremities, patella tendon reflexes intact .\par Dermatologic no apparent skin lesions, moist, warm, no rash.\par Spine:cervical spine appears normal and moves freely, thoracic spine appears normal and moves freely, lumbosacral spine appears normal and moves freely.\par Gait: nonantalgic. \par \par Right Knee\par Inspection: no effusion, minimal soft tissue swelling \par Wounds: healed midline incision\par Alignment: normal.\par Palpation: medial joint tenderness on palpation.\par ROM: Active (in degrees) 0-80\par Ligamentous laxity (neg): negative ant. drawer test, negative post. drawer test, stable to varus stress test, stable to valgus stress test,\par Patellofemoral Alignment Test: Q angle-, normal.\par Muscle Test: good quad strength.\par Leg examination: calf is soft and non-tender.  [de-identified] : Right knee x-ray, AP, lateral, merchant view taken at the office today demonstrates a revision total knee replacement in satisfactory position and alignment. No evidence of loosening. The patella sits in a central position.\par \par Left knee x-ray merchant view taken at the office today demonstrates joint space narrowing and a well centered patella.

## 2023-05-15 NOTE — HISTORY OF PRESENT ILLNESS
[de-identified] : RUBI OCONNELL 72 year old female presents for follow-up evaluation s/p RIGHT revision TKR done in 2018. She has agonizing pain at the medial aspect of her knee which has worsened over the past 1-2 months and continues to worsen. She also has numbness over the lateral aspect of her knee which emphasizes the pain she experiences on the medial aspect of her knee even more so. She has associated stiffness, muscle tightening, and warmth. She denies any injuries, fevers, chills, or night sweats. She takes Tylenol as needed.

## 2023-05-26 ENCOUNTER — APPOINTMENT (OUTPATIENT)
Dept: OBGYN | Facility: CLINIC | Age: 73
End: 2023-05-26
Payer: MEDICARE

## 2023-05-26 VITALS
WEIGHT: 165 LBS | DIASTOLIC BLOOD PRESSURE: 82 MMHG | HEART RATE: 76 BPM | SYSTOLIC BLOOD PRESSURE: 136 MMHG | HEIGHT: 65 IN | BODY MASS INDEX: 27.49 KG/M2

## 2023-05-26 PROCEDURE — 99214 OFFICE O/P EST MOD 30 MIN: CPT | Mod: 25

## 2023-05-26 PROCEDURE — 64566 NEUROELTRD STIM POST TIBIAL: CPT

## 2023-05-26 RX ORDER — VIBEGRON 75 MG/1
75 TABLET, FILM COATED ORAL
Refills: 1 | Status: ACTIVE | COMMUNITY
Start: 2023-05-26

## 2023-06-12 ENCOUNTER — NON-APPOINTMENT (OUTPATIENT)
Age: 73
End: 2023-06-12

## 2023-06-12 ENCOUNTER — APPOINTMENT (OUTPATIENT)
Dept: OBGYN | Facility: CLINIC | Age: 73
End: 2023-06-12
Payer: MEDICARE

## 2023-06-12 VITALS
WEIGHT: 165 LBS | HEIGHT: 65 IN | HEART RATE: 77 BPM | BODY MASS INDEX: 27.49 KG/M2 | DIASTOLIC BLOOD PRESSURE: 82 MMHG | SYSTOLIC BLOOD PRESSURE: 153 MMHG

## 2023-06-12 PROCEDURE — 64566 NEUROELTRD STIM POST TIBIAL: CPT

## 2023-06-12 PROCEDURE — 99214 OFFICE O/P EST MOD 30 MIN: CPT | Mod: 25

## 2023-06-12 NOTE — PROCEDURE
[Appropriate Reflexes Elicited] : appropriate reflexes were not elicited [Treatment cycle completed] : the treatment cycle was not completed [Adverse Reactions] : no adverse reactions, [FreeTextEntry1] : #booster 6/12/23- level #5

## 2023-06-12 NOTE — COUNSELING
[FreeTextEntry1] : UPDATE 6/12/23\par Lali 72-year-old para 2-0-1-2 presents for RX OAB FOLLOW UP post GALO/BSO for benign disease (menorrhagia)\par Urinalysis culture and cytology nl 2023, postvoid residual is normal\par Mammogram ordered and she will get her old records from [facility]-normal rpt 2023 will book july 2023\par Bone density ordered\par Renal ultrasound ordered because of the history of the prior hysterectomy to rule out upper tract disease-normal 2022 RPT AS CLINICALLY INDICATED\par Pap N/A due to prior complete hysterectomy\par colonoscopy-pt will ck when due (overdue/hx polyps)\par \par Regarding her urinary frequency and urgency without actual leakage in all likelihood she has dry OAB 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, prior surgery, multiparity, and very decreased or absent pelvic floor contraction ability-\par Treatment options include doing nothing, diet and lifestyle changes, physical therapy, pessary, medications, posterior tibial nerve stimulation or surgery to correct the cystocele.\par \par She chooses diet and lifestyle changes including an elimination diet of artificial sweetener diet Coke diet sodas, coffee, spicy food, artificial sweeteners, chocolate, alcohol and she will add back 1 of these after a 2-week period of abstinence 1 at a time to try to identify her food trigger or liquid trigger\par \par She also chooses physical therapy referral was made to [facility] physical therapy to help her identify her pelvic floor musculature and hopefully be able to contract her pelvic floor effectively thereby reducing urethrovesical FUNNEL < udfr-declined this after 2 treatments 'I'd rather leak' and 'too humiliating' etc. \par \par Additionally she will try a bit of weight loss, void every 2 hours while she is awake, space out her fluid intake to 4-hour maximum an hour with a total of no more than 70 to 80 ounces of total fluid per day\par \par And for now she chooses posterior tibial nerve stimulation in addition to the above.(STARTED 6/13/22 MONICA WELL)-6/12/23_< oab and now can wait 3-4 hours at night between voids-session today MONICA WELL (finds if she waits > 4 -5 weeks >oab, will come in monthly for boosters now.-gemtesa working well qd < noctura and < oab\par \par Appropriate referrals were made, literature was provided\par The patient had the opportunity to ask questions which were answered to her apparent satisfaction and 3D and computer modeling were utilized to explain the patient's situation to her\par \par Follow-up appointments were made and pex at one year rex/annual\par JMR\par

## 2023-06-12 NOTE — HISTORY OF PRESENT ILLNESS
[FreeTextEntry1] : Patient presents with several years of urgency frequency every 2 hours occasionally is able to hold up to 3 hours and occasionally has trouble making the 2-hour rex.  She is status post GALO/BSO in the past for menorrhagia according to the patient this was benign and was done at [place] [facility] a robotically.  She followed up gynecologically with Dr. Garcia but has not had a GYN exam evaluation or mammogram in 2 years since 2020.\par \par She does not have any leakage per se or prolapse symptoms and she does have nocturia up to 3 times per night.\par \par She is maintained on Wellbutrin and sees her psychiatrist regularly for years\par \par Mobility is impinged by a nonfunctional right knee that does not bend she has according to the patient has had 8 orthopedic surgeries some of them through [facility]\par \par Presents today for urogynecologic and gynecologic evaluation\par \par Patient admits to drinking diet soda coffee plenty of fluid including artificial sweeteners with aspartame

## 2023-06-12 NOTE — REVIEW OF SYSTEMS
[FreeTextEntry1] : Patient has had 9 surgeries on her right knee and it is basically not able to bend at this point

## 2023-07-21 ENCOUNTER — APPOINTMENT (OUTPATIENT)
Dept: ORTHOPEDIC SURGERY | Facility: CLINIC | Age: 73
End: 2023-07-21

## 2023-07-24 ENCOUNTER — NON-APPOINTMENT (OUTPATIENT)
Age: 73
End: 2023-07-24

## 2023-07-24 ENCOUNTER — APPOINTMENT (OUTPATIENT)
Dept: OBGYN | Facility: CLINIC | Age: 73
End: 2023-07-24
Payer: MEDICARE

## 2023-07-24 VITALS
HEART RATE: 99 BPM | HEIGHT: 65 IN | BODY MASS INDEX: 26.66 KG/M2 | DIASTOLIC BLOOD PRESSURE: 77 MMHG | WEIGHT: 160 LBS | SYSTOLIC BLOOD PRESSURE: 145 MMHG

## 2023-07-24 PROCEDURE — 99214 OFFICE O/P EST MOD 30 MIN: CPT | Mod: 25

## 2023-07-24 PROCEDURE — 64566 NEUROELTRD STIM POST TIBIAL: CPT

## 2023-07-24 NOTE — REASON FOR VISIT
[Follow-Up Visit_____] : a follow-up visit for [unfilled] [FreeTextEntry1] : oab < with ptns rxs no leakage at present

## 2023-07-24 NOTE — HISTORY OF PRESENT ILLNESS
[FreeTextEntry1] : Patient presents with several years of urgency frequency every 2 hours occasionally is able to hold up to 3 hours and occasionally has trouble making the 2-hour rex.  She is status post GALO/BSO in the past for menorrhagia according to the patient this was benign and was done at [facility] robot[facility].  She followed up gynecologically with Dr. Garcia but has not had a GYN exam evaluation or mammogram in 2 years since 2020.\par \par She does not have any leakage per se or prolapse symptoms and she does have nocturia up to 3 times per night.\par \par She is maintained on Wellbutrin and sees her psychiatrist regularly for years\par \par Mobility is impinged by a nonfunctional right knee that does not bend she has according to the patient has had 8 orthopedic surgeries some of them through [facility]\par \par Presents today for urogynecologic and gynecologic evaluation\par \par Patient admits to drinking diet soda coffee plenty of fluid including artificial sweeteners with aspartame

## 2023-07-24 NOTE — COUNSELING
[FreeTextEntry1] : UPDATE 7/24/23\par Lali 72-year-old para 2-0-1-2 presents for RX OAB FOLLOW UP post GALO/BSO for benign disease (menorrhagia)\par Urinalysis culture and cytology nl 2023, postvoid residual is normal\par Mammogram ordered and she will get her old records from [facility]-normal rpt 2023 will book july 2023\par Bone density ordered\par Renal ultrasound ordered because of the history of the prior hysterectomy to rule out upper tract disease-normal 2022 RPT AS CLINICALLY INDICATED\par Pap N/A due to prior complete hysterectomy\par colonoscopy-pt will ck when due (overdue/hx polyps)\par \par Regarding her urinary frequency and urgency without actual leakage in all likelihood she has dry OAB 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, prior surgery, multiparity, and very decreased or absent pelvic floor contraction ability-\par Treatment options include doing nothing, diet and lifestyle changes, physical therapy, pessary, medications, posterior tibial nerve stimulation or surgery to correct the cystocele.\par \par She chooses diet and lifestyle changes including an elimination diet of artificial sweetener diet Coke diet sodas, coffee, spicy food, artificial sweeteners, chocolate, alcohol and she will add back 1 of these after a 2-week period of abstinence 1 at a time to try to identify her food trigger or liquid trigger\par \par She also chooses physical therapy referral was made to [facility] physical therapy to help her identify her pelvic floor musculature and hopefully be able to contract her pelvic floor effectively thereby reducing urethrovesical FUNNEL < udfr-declined this after 2 treatments 'I'd rather leak' and 'too humiliating' etc. \par \par Additionally she will try a bit of weight loss, void every 2 hours while she is awake, space out her fluid intake to 4-hour maximum an hour with a total of no more than 70 to 80 ounces of total fluid per day\par \par And for now she chooses posterior tibial nerve stimulation in addition to the above.(STARTED 6/13/22 MONICA WELL)-7/24/23_< oab and now can wait 3-4 hours at night between voids-session today MONICA WELL (finds if she waits > 4 -5 weeks >oab, will come in monthly for boosters now.-gemtesa working well qd < noctura and < oab\par \par Appropriate referrals were made, literature was provided\par The patient had the opportunity to ask questions which were answered to her apparent satisfaction and 3D and computer modeling were utilized to explain the patient's situation to her\par \par Follow-up appointments were made and pex at one year rex/annual\par JMR\par

## 2023-07-24 NOTE — PROCEDURE
[Appropriate Reflexes Elicited] : appropriate reflexes were not elicited [Treatment cycle completed] : the treatment cycle was not completed [Adverse Reactions] : no adverse reactions, [FreeTextEntry1] : #booster 7/24//23- level #8

## 2023-07-28 ENCOUNTER — NON-APPOINTMENT (OUTPATIENT)
Age: 73
End: 2023-07-28

## 2023-07-31 NOTE — DISCHARGE NOTE ADULT - MEDICATION SUMMARY - MEDICATIONS TO CHANGE
Patient called left  stating she dropped her appointment card behind the stove. Called patient back spoke with spouse. Advise appointment date and time agin. Also advised of me sending out a [employer] link to set up [facility] to keep track of appointments. Spouse advised he received the link.
I will SWITCH the dose or number of times a day I take the medications listed below when I get home from the hospital:  None

## 2023-08-25 ENCOUNTER — NON-APPOINTMENT (OUTPATIENT)
Age: 73
End: 2023-08-25

## 2023-08-25 ENCOUNTER — APPOINTMENT (OUTPATIENT)
Dept: OBGYN | Facility: CLINIC | Age: 73
End: 2023-08-25
Payer: MEDICARE

## 2023-08-25 VITALS
HEART RATE: 74 BPM | HEIGHT: 65 IN | BODY MASS INDEX: 26.66 KG/M2 | DIASTOLIC BLOOD PRESSURE: 68 MMHG | SYSTOLIC BLOOD PRESSURE: 130 MMHG | WEIGHT: 160 LBS

## 2023-08-25 PROCEDURE — 64566 NEUROELTRD STIM POST TIBIAL: CPT

## 2023-08-25 PROCEDURE — 99214 OFFICE O/P EST MOD 30 MIN: CPT | Mod: 25

## 2023-09-09 NOTE — PATIENT PROFILE ADULT. - ANESTHESIA, PREVIOUS REACTION, PROFILE
Labs ordered. He is also do for MAW in Dec. Please schedule for this separately from his upcoming appt with me. Thank you! none

## 2023-09-22 ENCOUNTER — APPOINTMENT (OUTPATIENT)
Dept: OBGYN | Facility: CLINIC | Age: 73
End: 2023-09-22

## 2023-09-22 ENCOUNTER — NON-APPOINTMENT (OUTPATIENT)
Age: 73
End: 2023-09-22

## 2023-10-09 ENCOUNTER — APPOINTMENT (OUTPATIENT)
Dept: OBGYN | Facility: CLINIC | Age: 73
End: 2023-10-09
Payer: MEDICARE

## 2023-10-09 VITALS
WEIGHT: 166 LBS | BODY MASS INDEX: 27.66 KG/M2 | HEIGHT: 65 IN | DIASTOLIC BLOOD PRESSURE: 76 MMHG | SYSTOLIC BLOOD PRESSURE: 136 MMHG | HEART RATE: 79 BPM

## 2023-10-09 PROCEDURE — 99214 OFFICE O/P EST MOD 30 MIN: CPT | Mod: 25

## 2023-10-09 PROCEDURE — 64566 NEUROELTRD STIM POST TIBIAL: CPT

## 2023-10-10 LAB
APPEARANCE: CLEAR
BACTERIA: NEGATIVE /HPF
BILIRUBIN URINE: NEGATIVE
BLOOD URINE: NEGATIVE
CAST: 2 /LPF
COLOR: YELLOW
EPITHELIAL CELLS: 8 /HPF
GLUCOSE QUALITATIVE U: NEGATIVE MG/DL
KETONES URINE: NEGATIVE MG/DL
LEUKOCYTE ESTERASE URINE: ABNORMAL
MICROSCOPIC-UA: NORMAL
NITRITE URINE: NEGATIVE
PH URINE: 5.5
PROTEIN URINE: NEGATIVE MG/DL
RED BLOOD CELLS URINE: 1 /HPF
SPECIFIC GRAVITY URINE: 1.02
UROBILINOGEN URINE: 0.2 MG/DL
WHITE BLOOD CELLS URINE: 11 /HPF

## 2023-10-11 LAB — BACTERIA UR CULT: NORMAL

## 2023-11-13 ENCOUNTER — APPOINTMENT (OUTPATIENT)
Dept: OBGYN | Facility: CLINIC | Age: 73
End: 2023-11-13
Payer: MEDICARE

## 2023-11-13 VITALS — DIASTOLIC BLOOD PRESSURE: 79 MMHG | HEART RATE: 77 BPM | SYSTOLIC BLOOD PRESSURE: 153 MMHG

## 2023-11-13 PROCEDURE — 99214 OFFICE O/P EST MOD 30 MIN: CPT | Mod: 25

## 2023-11-13 PROCEDURE — 64566 NEUROELTRD STIM POST TIBIAL: CPT

## 2023-11-14 LAB
APPEARANCE: CLEAR
BACTERIA: NEGATIVE /HPF
BILIRUBIN URINE: NEGATIVE
BLOOD URINE: NEGATIVE
CAST: 2 /LPF
COLOR: YELLOW
EPITHELIAL CELLS: 6 /HPF
GLUCOSE QUALITATIVE U: NEGATIVE MG/DL
KETONES URINE: NEGATIVE MG/DL
LEUKOCYTE ESTERASE URINE: ABNORMAL
MICROSCOPIC-UA: NORMAL
NITRITE URINE: NEGATIVE
PH URINE: 5.5
PROTEIN URINE: NORMAL MG/DL
RED BLOOD CELLS URINE: 2 /HPF
SPECIFIC GRAVITY URINE: >1.03
UROBILINOGEN URINE: 0.2 MG/DL
WHITE BLOOD CELLS URINE: 18 /HPF

## 2023-12-11 ENCOUNTER — NON-APPOINTMENT (OUTPATIENT)
Age: 73
End: 2023-12-11

## 2023-12-11 ENCOUNTER — APPOINTMENT (OUTPATIENT)
Dept: OBGYN | Facility: CLINIC | Age: 73
End: 2023-12-11
Payer: MEDICARE

## 2023-12-11 VITALS
DIASTOLIC BLOOD PRESSURE: 71 MMHG | WEIGHT: 168 LBS | HEIGHT: 65 IN | SYSTOLIC BLOOD PRESSURE: 127 MMHG | HEART RATE: 76 BPM | BODY MASS INDEX: 27.99 KG/M2

## 2023-12-11 PROCEDURE — 64566 NEUROELTRD STIM POST TIBIAL: CPT

## 2023-12-11 PROCEDURE — 99214 OFFICE O/P EST MOD 30 MIN: CPT | Mod: 25

## 2024-01-10 ENCOUNTER — APPOINTMENT (OUTPATIENT)
Dept: RHEUMATOLOGY | Facility: CLINIC | Age: 74
End: 2024-01-10
Payer: MEDICARE

## 2024-01-10 VITALS
WEIGHT: 167 LBS | BODY MASS INDEX: 27.82 KG/M2 | OXYGEN SATURATION: 96 % | DIASTOLIC BLOOD PRESSURE: 88 MMHG | HEIGHT: 65 IN | SYSTOLIC BLOOD PRESSURE: 166 MMHG | TEMPERATURE: 96.6 F | HEART RATE: 86 BPM

## 2024-01-10 DIAGNOSIS — R20.0 ANESTHESIA OF SKIN: ICD-10-CM

## 2024-01-10 DIAGNOSIS — M79.642 PAIN IN LEFT HAND: ICD-10-CM

## 2024-01-10 PROCEDURE — 99204 OFFICE O/P NEW MOD 45 MIN: CPT

## 2024-01-10 NOTE — REVIEW OF SYSTEMS
[Joint Pain] : joint pain [As Noted in HPI] : as noted in HPI [Negative] : Heme/Lymph [de-identified] : L hand numbness

## 2024-01-10 NOTE — HISTORY OF PRESENT ILLNESS
[FreeTextEntry1] : 73F Right handed female with mild depression and overactive bladder, OA of b/l knees, here for evaluation of L hand pain/numbness that has been ongoing for the past 6 months.  Denies any trauma to the hand or arm; denies neck pain or history of spinal problems.  She has pain only in L 2nd MCP joint; denies pain in any other joint of either hand, or other joints.  She is s/p multiple surgeries to R. knee after she sustained a fall. s/p R. knee replacement.  She denies rashes, oral ulcers, sicca symptoms, hematuria, Raynauds, or unintentional weight loss.  [Depression] : depression [Malar Facial Rash] : no malar facial rash [Skin Lesions] : no lesions [Skin Nodules] : no skin nodules [Oral Ulcers] : no oral ulcers [Dry Mouth] : no dry mouth [Shortness of Breath] : no shortness of breath [Chest Pain] : no chest pain [Arthralgias] : no arthralgias [Morning Stiffness] : no morning stiffness [Visual Changes] : no visual changes [Eye Pain] : no eye pain [Eye Redness] : no eye redness [Dry Eyes] : no dry eyes

## 2024-01-10 NOTE — PHYSICAL EXAM
[General Appearance - Alert] : alert [General Appearance - In No Acute Distress] : in no acute distress [General Appearance - Well Developed] : well developed [Sclera] : the sclera and conjunctiva were normal [Extraocular Movements] : extraocular movements were intact [Exaggerated Use Of Accessory Muscles For Inspiration] : no accessory muscle use [Edema] : there was no peripheral edema [Skin Color & Pigmentation] : normal skin color and pigmentation [] : no rash [Skin Lesions] : no skin lesions [FreeTextEntry1] : +Phalen sign on L. hand  [Affect] : the affect was normal [Oriented To Time, Place, And Person] : oriented to person, place, and time [Mood] : the mood was normal

## 2024-01-10 NOTE — ASSESSMENT
[FreeTextEntry1] : 73F with depression, overactive bladder, b/l knee OA, here for evaluation of 6 months of pain and numbness of L. hand, with pain mainly overlying L 2nd MCP joint. Atraumatic.   Differential for what could be causing the pain and numbness include: OA of the hand, although it would not explain why she has numbness; versus a neuropathy arising from c-spine (although patient denies any neck/c-spine pain); suspicion for inflammatory arthritis is low given patient's age, only one joint involved, and also because she denies many of the autoimmune review of systems- but regardless will check ESR, RF, and Sjogrens testing given her complaints of numbness.   Will obtain Xrays of b/l hands to evaluate for arthritis.  Patient was advised that if Xrays demonstrate hand OA, and bloodwork is otherwise unremarkable (if ESR is normal), will consider referral to neurology for an EMG study.

## 2024-01-12 ENCOUNTER — OUTPATIENT (OUTPATIENT)
Dept: OUTPATIENT SERVICES | Facility: HOSPITAL | Age: 74
LOS: 1 days | End: 2024-01-12
Payer: MEDICARE

## 2024-01-12 ENCOUNTER — APPOINTMENT (OUTPATIENT)
Dept: RADIOLOGY | Facility: CLINIC | Age: 74
End: 2024-01-12
Payer: MEDICARE

## 2024-01-12 DIAGNOSIS — Z96.659 PRESENCE OF UNSPECIFIED ARTIFICIAL KNEE JOINT: Chronic | ICD-10-CM

## 2024-01-12 DIAGNOSIS — Z00.8 ENCOUNTER FOR OTHER GENERAL EXAMINATION: ICD-10-CM

## 2024-01-12 DIAGNOSIS — Z98.890 OTHER SPECIFIED POSTPROCEDURAL STATES: Chronic | ICD-10-CM

## 2024-01-12 DIAGNOSIS — M79.642 PAIN IN LEFT HAND: ICD-10-CM

## 2024-01-12 PROCEDURE — 73130 X-RAY EXAM OF HAND: CPT

## 2024-01-12 PROCEDURE — 73130 X-RAY EXAM OF HAND: CPT | Mod: 26,50

## 2024-01-16 ENCOUNTER — NON-APPOINTMENT (OUTPATIENT)
Age: 74
End: 2024-01-16

## 2024-01-16 LAB
ALBUMIN SERPL ELPH-MCNC: 4.3 G/DL
ALP BLD-CCNC: 76 U/L
ALT SERPL-CCNC: 27 U/L
ANION GAP SERPL CALC-SCNC: 11 MMOL/L
AST SERPL-CCNC: 17 U/L
BASOPHILS # BLD AUTO: 0.05 K/UL
BASOPHILS NFR BLD AUTO: 0.5 %
BILIRUB SERPL-MCNC: 0.4 MG/DL
BUN SERPL-MCNC: 15 MG/DL
CALCIUM SERPL-MCNC: 9.5 MG/DL
CHLORIDE SERPL-SCNC: 101 MMOL/L
CO2 SERPL-SCNC: 29 MMOL/L
CREAT SERPL-MCNC: 0.81 MG/DL
EGFR: 77 ML/MIN/1.73M2
ENA SS-A AB SER IA-ACNC: <0.2 AL
ENA SS-B AB SER IA-ACNC: <0.2 AL
EOSINOPHIL # BLD AUTO: 0.17 K/UL
EOSINOPHIL NFR BLD AUTO: 1.7 %
ERYTHROCYTE [SEDIMENTATION RATE] IN BLOOD BY WESTERGREN METHOD: 11 MM/HR
GLUCOSE SERPL-MCNC: 89 MG/DL
HCT VFR BLD CALC: 40.9 %
HGB BLD-MCNC: 13.7 G/DL
IMM GRANULOCYTES NFR BLD AUTO: 0.3 %
LYMPHOCYTES # BLD AUTO: 2.91 K/UL
LYMPHOCYTES NFR BLD AUTO: 28.4 %
MAN DIFF?: NORMAL
MCHC RBC-ENTMCNC: 30 PG
MCHC RBC-ENTMCNC: 33.5 GM/DL
MCV RBC AUTO: 89.7 FL
MONOCYTES # BLD AUTO: 0.48 K/UL
MONOCYTES NFR BLD AUTO: 4.7 %
NEUTROPHILS # BLD AUTO: 6.61 K/UL
NEUTROPHILS NFR BLD AUTO: 64.4 %
PLATELET # BLD AUTO: 399 K/UL
POTASSIUM SERPL-SCNC: 4.1 MMOL/L
PROT SERPL-MCNC: 6.4 G/DL
RBC # BLD: 4.56 M/UL
RBC # FLD: 12.7 %
RHEUMATOID FACT SER QL: <10 IU/ML
SODIUM SERPL-SCNC: 142 MMOL/L
TSH SERPL-ACNC: 1.63 UIU/ML
WBC # FLD AUTO: 10.25 K/UL

## 2024-01-22 ENCOUNTER — APPOINTMENT (OUTPATIENT)
Dept: FAMILY MEDICINE | Facility: CLINIC | Age: 74
End: 2024-01-22
Payer: MEDICARE

## 2024-01-22 ENCOUNTER — APPOINTMENT (OUTPATIENT)
Dept: OBGYN | Facility: CLINIC | Age: 74
End: 2024-01-22
Payer: MEDICARE

## 2024-01-22 ENCOUNTER — NON-APPOINTMENT (OUTPATIENT)
Age: 74
End: 2024-01-22

## 2024-01-22 VITALS
TEMPERATURE: 98.4 F | WEIGHT: 167 LBS | OXYGEN SATURATION: 99 % | RESPIRATION RATE: 16 BRPM | BODY MASS INDEX: 27.82 KG/M2 | SYSTOLIC BLOOD PRESSURE: 146 MMHG | DIASTOLIC BLOOD PRESSURE: 73 MMHG | HEIGHT: 65 IN | HEART RATE: 65 BPM

## 2024-01-22 VITALS
HEIGHT: 65 IN | SYSTOLIC BLOOD PRESSURE: 155 MMHG | DIASTOLIC BLOOD PRESSURE: 81 MMHG | HEART RATE: 69 BPM | WEIGHT: 155 LBS | BODY MASS INDEX: 25.83 KG/M2

## 2024-01-22 DIAGNOSIS — Z78.9 OTHER SPECIFIED HEALTH STATUS: ICD-10-CM

## 2024-01-22 DIAGNOSIS — E66.3 OVERWEIGHT: ICD-10-CM

## 2024-01-22 DIAGNOSIS — F32.A DEPRESSION, UNSPECIFIED: ICD-10-CM

## 2024-01-22 DIAGNOSIS — Z80.3 FAMILY HISTORY OF MALIGNANT NEOPLASM OF BREAST: ICD-10-CM

## 2024-01-22 DIAGNOSIS — Z00.00 ENCOUNTER FOR GENERAL ADULT MEDICAL EXAMINATION W/OUT ABNORMAL FINDINGS: ICD-10-CM

## 2024-01-22 DIAGNOSIS — G47.09 OTHER INSOMNIA: ICD-10-CM

## 2024-01-22 PROCEDURE — 64566 NEUROELTRD STIM POST TIBIAL: CPT

## 2024-01-22 PROCEDURE — 99205 OFFICE O/P NEW HI 60 MIN: CPT

## 2024-01-22 PROCEDURE — 99214 OFFICE O/P EST MOD 30 MIN: CPT | Mod: 25

## 2024-01-22 RX ORDER — TRAZODONE HYDROCHLORIDE 50 MG/1
50 TABLET ORAL
Qty: 30 | Refills: 3 | Status: ACTIVE | COMMUNITY
Start: 2024-01-22

## 2024-01-22 NOTE — HEALTH RISK ASSESSMENT
[Very Good] : ~his/her~  mood as very good [Yes] : Yes [2 - 4 times a month (2 pts)] : 2-4 times a month (2 points) [1 or 2 (0 pts)] : 1 or 2 (0 points) [Never (0 pts)] : Never (0 points) [No] : In the past 12 months have you used drugs other than those required for medical reasons? No [No falls in past year] : Patient reported no falls in the past year [Little interest or pleasure doing things] : 1) Little interest or pleasure doing things [Feeling down, depressed, or hopeless] : 2) Feeling down, depressed, or hopeless [0] : 2) Feeling down, depressed, or hopeless: Not at all (0) [I have developed a follow-up plan documented below in the note.] : I have developed a follow-up plan documented below in the note. [HIV test declined] : HIV test declined [Audit-CScore] : 2 [Change in mental status noted] : No change in mental status noted [Language] : denies difficulty with language [Behavior] : denies difficulty with behavior [Learning/Retaining New Information] : denies difficulty learning/retaining new information [Handling Complex Tasks] : denies difficulty handling complex tasks [Reasoning] : denies difficulty with reasoning [Spatial Ability and Orientation] : denies difficulty with spatial ability and orientation [None] : None [With Family] : lives with family [# of Members in Household ___] :  household currently consist of [unfilled] member(s) [Graduate School] : graduate school [Retired] : retired [] :  [# Of Children ___] : has [unfilled] children [Sexually Active] : sexually active [High Risk Behavior] : no high risk behavior [Feels Safe at Home] : Feels safe at home [Fully functional (bathing, dressing, toileting, transferring, walking, feeding)] : Fully functional (bathing, dressing, toileting, transferring, walking, feeding) [Fully functional (using the telephone, shopping, preparing meals, housekeeping, doing laundry, using] : Fully functional and needs no help or supervision to perform IADLs (using the telephone, shopping, preparing meals, housekeeping, doing laundry, using transportation, managing medications and managing finances) [Reports changes in hearing] : Reports no changes in hearing [Reports changes in vision] : Reports no changes in vision [Reports normal functional visual acuity (ie: able to read med bottle)] : Reports poor functional visual acuity.  [Reports changes in dental health] : Reports no changes in dental health [Smoke Detector] : smoke detector [Carbon Monoxide Detector] : carbon monoxide detector [Safety elements used in home] : safety elements used in home [Seat Belt] :  uses seat belt [Sunscreen] : uses sunscreen [Travel to Developing Areas] : does not  travel to developing areas [TB Exposure] : is not being exposed to tuberculosis [Caregiver Concerns] : does not have caregiver concerns [de-identified] : glasses [With Patient/Caregiver] : , with patient/caregiver [Reviewed no changes] : Reviewed, no changes [Designated Healthcare Proxy] : Designated healthcare proxy [Name: ___] : Health Care Proxy's Name: [unfilled]  [Relationship: ___] : Relationship: [unfilled] [Aggressive treatment] : aggressive treatment [I will adhere to the patient's wishes.] : I will adhere to the patient's wishes. [AdvancecareDate] : 01/24 [Never] : Never

## 2024-01-22 NOTE — HISTORY OF PRESENT ILLNESS
[FreeTextEntry1] : To establish self as patient. [de-identified] : Patient is 73-year-old female who presents today to establish herself as a patient medical history significant for degenerative joint disease patient has had right total knee replacement with revision and history of fractured ankle.  Which occurred in 2010.  Patient has been seen by multiple subspecialist that is orthopedic surgeons and still has residual pain.  Medical history significant for depression presently well-controlled with Wellbutrin, insomnia,

## 2024-01-22 NOTE — REVIEW OF SYSTEMS
[Headache] : no headache [Dizziness] : dizziness [Fainting] : no fainting [Confusion] : no confusion [Memory Loss] : no memory loss [Unsteady Walking] : no ataxia [Negative] : Heme/Lymph [de-identified] : Paresthesia left upper extremity recently seen by hand surgeon diagnosed with carpal tunnel syndrome

## 2024-01-22 NOTE — ASSESSMENT
[FreeTextEntry1] : Assessment and plan:  1.  Patient is here to establish herself as a patient.  In usual state of health denies any chest pain or shortness of breath.  2.  Physical exam shows no acute findings.  3.  Depression patient is seen by psychiatry continue Wellbutrin 100 mg 2 tablets in a.m. and 1 tablet in p.m. patient has no suicidal or homicidal ideation she states that her mood is well-controlled with present medical management.  4.  Urinary frequency overactive bladder continue David Cyn 75 mg 1 tablet daily.  5.  Insomnia well-controlled with trazodone therefore continue present medical management without change.  6.  Reviewed with patient her most recent blood work  7.  Left carpal tunnel syndrome patient is scheduled for carpal tunnel release surgery.  8.  Total time spent face-to-face and non-face-to-face time 60 minutes the majority of which was spent on counseling and coordination of care.

## 2024-01-23 ENCOUNTER — OUTPATIENT (OUTPATIENT)
Dept: OUTPATIENT SERVICES | Facility: HOSPITAL | Age: 74
LOS: 1 days | End: 2024-01-23
Payer: MEDICARE

## 2024-01-23 VITALS
SYSTOLIC BLOOD PRESSURE: 146 MMHG | DIASTOLIC BLOOD PRESSURE: 65 MMHG | HEIGHT: 64 IN | HEART RATE: 63 BPM | TEMPERATURE: 98 F | OXYGEN SATURATION: 98 % | WEIGHT: 162.92 LBS | RESPIRATION RATE: 18 BRPM

## 2024-01-23 DIAGNOSIS — G56.02 CARPAL TUNNEL SYNDROME, LEFT UPPER LIMB: ICD-10-CM

## 2024-01-23 DIAGNOSIS — F41.9 ANXIETY DISORDER, UNSPECIFIED: ICD-10-CM

## 2024-01-23 DIAGNOSIS — Z96.659 PRESENCE OF UNSPECIFIED ARTIFICIAL KNEE JOINT: Chronic | ICD-10-CM

## 2024-01-23 DIAGNOSIS — Z98.890 OTHER SPECIFIED POSTPROCEDURAL STATES: Chronic | ICD-10-CM

## 2024-01-23 PROCEDURE — 93010 ELECTROCARDIOGRAM REPORT: CPT

## 2024-01-23 RX ORDER — LIDOCAINE 4 G/100G
0 CREAM TOPICAL
Qty: 0 | Refills: 0 | DISCHARGE

## 2024-01-23 NOTE — H&P PST ADULT - NSICDXPASTMEDICALHX_GEN_ALL_CORE_FT
PAST MEDICAL HISTORY:  Anxiety     Benign breast lumps     Carpal tunnel syndrome, left upper limb     Depression     Fibroids     H/O fracture of ankle     History of fracture due to fall     History of tear of ACL (anterior cruciate ligament)     Insomnia     Osteoarthritis     Overactive bladder     Torn meniscus

## 2024-01-23 NOTE — H&P PST ADULT - NSANTHOSAYNRD_GEN_A_CORE
[>50% of Time Spent on Counseling for ____] : Greater than 50% of the encounter time was spent on counseling for [unfilled] [Time Spent: ___ minutes] : I have spent [unfilled] minutes of face to face time with the patient No. TANK screening performed.  STOP BANG Legend: 0-2 = LOW Risk; 3-4 = INTERMEDIATE Risk; 5-8 = HIGH Risk

## 2024-01-23 NOTE — H&P PST ADULT - MS GEN HX ROS MEA POS PC
Addendum  created 03/15/23 0951 by Julito Salazar APRN CRNA    Intraprocedure Meds edited       left hand numbness and tingling/arthralgia/arthritis Oa b/l knees, c/o left hand numbness and tingling/arthralgia/arthritis

## 2024-01-23 NOTE — H&P PST ADULT - NSICDXPASTSURGICALHX_GEN_ALL_CORE_FT
PAST SURGICAL HISTORY:  H/O colonoscopy     H/O medial meniscus repair of right knee     H/O: Hysterectomy (ICD9 V88.01)     H/O: Knee Surgery (ICD9 V45.89) several times    History of Tonsillectomy (ICD9 V45.89)     S/P ACL repair right    S/P knee replacement Right  ( 2014 )    S/P lumpectomy of breast

## 2024-01-23 NOTE — H&P PST ADULT - NEGATIVE GENERAL GENITOURINARY SYMPTOMS
no hematuria/no flank pain L/no flank pain R/no urine discoloration/no incontinence/no urinary hesitancy/normal urinary frequency/no nocturia

## 2024-01-23 NOTE — H&P PST ADULT - PSY GEN HX ROS MEA POS PC
On Buprion and trazodone/depression/anxiety/insomnia On Bupropion and trazodone/depression/anxiety/insomnia

## 2024-01-23 NOTE — H&P PST ADULT - MUSCULOSKELETAL
details… ROM intact/no joint swelling/no joint erythema/no joint warmth/no calf tenderness/normal gait/strength 5/5 bilateral lower extremities/no chest wall tenderness/decreased strength

## 2024-01-23 NOTE — H&P PST ADULT - PROBLEM SELECTOR PLAN 1
Patient tentatively scheduled for Left wrist endoscopic carpal tunnel release on 1/29/24.  Pre-op instructions provided. Pt given verbal and written instructions with teach back on chlorhexidine shampoo and pepcid. Pt verbalized understanding with return demonstration.     Copy of labs CBC, CMP in chart.  EKG was done at [facility].  No further evaluation requested.

## 2024-01-28 ENCOUNTER — TRANSCRIPTION ENCOUNTER (OUTPATIENT)
Age: 74
End: 2024-01-28

## 2024-01-29 ENCOUNTER — OUTPATIENT (OUTPATIENT)
Dept: OUTPATIENT SERVICES | Facility: HOSPITAL | Age: 74
LOS: 1 days | Discharge: HOME | End: 2024-01-29

## 2024-01-29 ENCOUNTER — APPOINTMENT (OUTPATIENT)
Dept: OBGYN | Facility: CLINIC | Age: 74
End: 2024-01-29

## 2024-01-29 ENCOUNTER — TRANSCRIPTION ENCOUNTER (OUTPATIENT)
Age: 74
End: 2024-01-29

## 2024-01-29 VITALS
DIASTOLIC BLOOD PRESSURE: 61 MMHG | TEMPERATURE: 98 F | SYSTOLIC BLOOD PRESSURE: 134 MMHG | HEART RATE: 62 BPM | RESPIRATION RATE: 14 BRPM | OXYGEN SATURATION: 100 %

## 2024-01-29 VITALS
SYSTOLIC BLOOD PRESSURE: 152 MMHG | OXYGEN SATURATION: 98 % | DIASTOLIC BLOOD PRESSURE: 78 MMHG | RESPIRATION RATE: 18 BRPM | HEIGHT: 64 IN | WEIGHT: 162.92 LBS | TEMPERATURE: 98 F | HEART RATE: 63 BPM

## 2024-01-29 DIAGNOSIS — Z96.659 PRESENCE OF UNSPECIFIED ARTIFICIAL KNEE JOINT: Chronic | ICD-10-CM

## 2024-01-29 DIAGNOSIS — Z98.890 OTHER SPECIFIED POSTPROCEDURAL STATES: Chronic | ICD-10-CM

## 2024-01-29 DIAGNOSIS — G56.02 CARPAL TUNNEL SYNDROME, LEFT UPPER LIMB: ICD-10-CM

## 2024-01-29 NOTE — ASU DISCHARGE PLAN (ADULT/PEDIATRIC) - NS MD DC FALL RISK RISK
For information on Fall & Injury Prevention, visit: [email or web address] OR  [email or web address] OR  https://www.cdc.gov/steadi/patient.html

## 2024-01-29 NOTE — ASU DISCHARGE PLAN (ADULT/PEDIATRIC) - FOLLOW UP APPOINTMENTS
[phone]/[facility] ([facility])

## 2024-01-29 NOTE — ASU DISCHARGE PLAN (ADULT/PEDIATRIC) - CARE PROVIDER_API CALL
Cecil Burdick  Orthopaedic Surgery  [address] [place] [address], [address]  [place] [address]  Phone: [phone]  Fax: [phone]  Follow Up Time:

## 2024-02-21 ENCOUNTER — APPOINTMENT (OUTPATIENT)
Dept: OBGYN | Facility: CLINIC | Age: 74
End: 2024-02-21
Payer: MEDICARE

## 2024-02-21 ENCOUNTER — NON-APPOINTMENT (OUTPATIENT)
Age: 74
End: 2024-02-21

## 2024-02-21 PROCEDURE — 64566 NEUROELTRD STIM POST TIBIAL: CPT

## 2024-02-21 PROCEDURE — 99214 OFFICE O/P EST MOD 30 MIN: CPT | Mod: 25

## 2024-02-22 PROBLEM — D21.9 BENIGN NEOPLASM OF CONNECTIVE AND OTHER SOFT TISSUE, UNSPECIFIED: Chronic | Status: ACTIVE | Noted: 2024-01-23

## 2024-02-22 PROBLEM — G56.02 CARPAL TUNNEL SYNDROME, LEFT UPPER LIMB: Chronic | Status: ACTIVE | Noted: 2024-01-23

## 2024-02-22 PROBLEM — Z87.81 PERSONAL HISTORY OF (HEALED) TRAUMATIC FRACTURE: Chronic | Status: ACTIVE | Noted: 2024-01-23

## 2024-02-22 PROBLEM — Z87.828 PERSONAL HISTORY OF OTHER (HEALED) PHYSICAL INJURY AND TRAUMA: Chronic | Status: ACTIVE | Noted: 2024-01-23

## 2024-02-22 PROBLEM — N32.81 OVERACTIVE BLADDER: Chronic | Status: ACTIVE | Noted: 2024-01-23

## 2024-02-22 PROBLEM — S83.209A UNSPECIFIED TEAR OF UNSPECIFIED MENISCUS, CURRENT INJURY, UNSPECIFIED KNEE, INITIAL ENCOUNTER: Chronic | Status: ACTIVE | Noted: 2024-01-23

## 2024-02-22 PROBLEM — G47.00 INSOMNIA, UNSPECIFIED: Chronic | Status: ACTIVE | Noted: 2024-01-23

## 2024-02-22 PROBLEM — N63.0 UNSPECIFIED LUMP IN UNSPECIFIED BREAST: Chronic | Status: ACTIVE | Noted: 2024-01-23

## 2024-02-22 PROBLEM — M19.90 UNSPECIFIED OSTEOARTHRITIS, UNSPECIFIED SITE: Chronic | Status: ACTIVE | Noted: 2024-01-23

## 2024-03-25 ENCOUNTER — APPOINTMENT (OUTPATIENT)
Dept: OBGYN | Facility: CLINIC | Age: 74
End: 2024-03-25
Payer: MEDICARE

## 2024-03-25 ENCOUNTER — NON-APPOINTMENT (OUTPATIENT)
Age: 74
End: 2024-03-25

## 2024-03-25 DIAGNOSIS — R35.0 FREQUENCY OF MICTURITION: ICD-10-CM

## 2024-03-25 PROCEDURE — 99214 OFFICE O/P EST MOD 30 MIN: CPT | Mod: 25

## 2024-03-25 PROCEDURE — 64566 NEUROELTRD STIM POST TIBIAL: CPT

## 2024-04-29 ENCOUNTER — APPOINTMENT (OUTPATIENT)
Dept: OBGYN | Facility: CLINIC | Age: 74
End: 2024-04-29
Payer: MEDICARE

## 2024-04-29 ENCOUNTER — NON-APPOINTMENT (OUTPATIENT)
Age: 74
End: 2024-04-29

## 2024-04-29 VITALS
SYSTOLIC BLOOD PRESSURE: 151 MMHG | HEART RATE: 68 BPM | HEIGHT: 65 IN | BODY MASS INDEX: 27.16 KG/M2 | WEIGHT: 163 LBS | DIASTOLIC BLOOD PRESSURE: 74 MMHG

## 2024-04-29 PROCEDURE — 64566 NEUROELTRD STIM POST TIBIAL: CPT

## 2024-04-29 PROCEDURE — 99214 OFFICE O/P EST MOD 30 MIN: CPT | Mod: 25

## 2024-04-29 RX ORDER — VIBEGRON 75 MG/1
75 TABLET, FILM COATED ORAL
Qty: 1 | Refills: 5 | Status: ACTIVE | COMMUNITY
Start: 2023-08-25 | End: 1900-01-01

## 2024-04-29 NOTE — HISTORY OF PRESENT ILLNESS
[FreeTextEntry1] : Patient presents with several years of urgency frequency every 2 hours occasionally is able to hold up to 3 hours and occasionally has trouble making the 2-hour rex.  She is status post GALO/BSO in the past for menorrhagia according to the patient this was benign and was done at [place] a robotically.  She followed up gynecologically with Dr. Garcia but has not had a GYN exam evaluation or mammogram in 2 years since 2020.  She does not have any leakage per se or prolapse symptoms and she does have nocturia up to 3 times per night.  She is maintained on Wellbutrin and sees her psychiatrist regularly for years  Mobility is impinged by a nonfunctional right knee that does not bend she has according to the patient has had 8 orthopedic surgeries some of them through [facility]  Presents today for urogynecologic and gynecologic evaluation  Patient admits to drinking diet soda coffee plenty of fluid including artificial sweeteners with aspartame  Update October 9, 2023 patient doing well on Gemtesa 75 mg a day and PTNS every 1 to 2 months session completed successfully today we will try to collect UA U culture and cytology from patient PVRs have been normal we will check at next visit

## 2024-04-29 NOTE — COUNSELING
[FreeTextEntry1] : UPDATE april 29, 2024 Lali 73-year-old para 2-0-1-2 presents for RX OAB FOLLOW UP post GALO/BSO for benign disease (menorrhagia) Urinalysis culture and cytology nl 2023, postvoid residual is normal at the last visit we will check at the next visit -enough for ua only 11/13/23 will try for uc and cytol also nv Mammogram normal 2023 rpt 2024 Bone density is up-to-date rpt 2025 Renal ultrasound ordered because of the history of the prior hysterectomy to rule out upper tract disease-normal 2022 RPT AS CLINICALLY INDICATED summer 2024 Pap N/A due to prior complete hysterectomy colonoscopy-normal per pt w/in past month  Regarding her urinary frequency and urgency without actual leakage in all likelihood she has dry OAB 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, prior surgery, multiparity, and very decreased or absent pelvic floor contraction ability- Treatment options include doing nothing, diet and lifestyle changes, physical therapy, pessary, medications, posterior tibial nerve stimulation or surgery to correct the cystocele.  She chooses diet and lifestyle changes including an elimination diet of artificial sweetener diet Coke diet sodas, coffee, spicy food, artificial sweeteners, chocolate, alcohol and she will add back 1 of these after a 2-week period of abstinence 1 at a time to try to identify her food trigger or liquid trigger-1/22/24-has deemed that carbonated beverages are a major trigger and reduction of elimination of these have helped tremendously reduce her OAB  She also has chosen in the past physical therapy referral was [facility] to help her identify her pelvic floor musculature and hopefully be able to contract her pelvic floor effectively thereby reducing urethrovesical FUNNEL < udfr-declined this after 2 treatments 'I'd rather leak' and 'too humiliating' etc.   Additionally she will try a bit of weight loss, void every 2 hours while she is awake, space out her fluid intake to 4-hour maximum an hour with a total of no more than 70 to 80 ounces of total fluid per day  And for now she chooses posterior tibial nerve stimulation in addition to the above.(STARTED 6/13/22 [facility])-7/24/23_< oab and now can wait 3-4 hours at night between voids-session today [facility] (finds if she waits > 4 -5 weeks >oab, will come in monthly for boosters now.-gemtesa working well qd < noctura and < oab-working very well as of today 4/29/24  Appropriate referrals were made, literature was provided The patient had the opportunity to ask questions which were answered to her apparent satisfaction and 3D and computer modeling were utilized to explain the patient's situation to her  Follow-up appointments were made and pex at one year rex/annual  Mammogram breast ultrasound is due this summer after July 30 and a bone density is not due until the summer 2025 MATT

## 2024-04-29 NOTE — PROCEDURE
[Appropriate Reflexes Elicited] : appropriate reflexes were not elicited [Treatment cycle completed] : the treatment cycle was not completed [Adverse Reactions] : no adverse reactions, [FreeTextEntry1] : #booster april 29, 2024 level #7 left leg

## 2024-05-22 ENCOUNTER — APPOINTMENT (OUTPATIENT)
Dept: ORTHOPEDIC SURGERY | Facility: CLINIC | Age: 74
End: 2024-05-22
Payer: MEDICARE

## 2024-05-22 VITALS — HEIGHT: 65 IN | BODY MASS INDEX: 27.49 KG/M2 | WEIGHT: 165 LBS

## 2024-05-22 DIAGNOSIS — T84.82XA FIBROSIS DUE TO INTERNAL ORTHOPEDIC PROSTHETIC DEVICES, IMPLANTS AND GRAFTS, INITIAL ENCOUNTER: ICD-10-CM

## 2024-05-22 PROCEDURE — 99214 OFFICE O/P EST MOD 30 MIN: CPT

## 2024-05-22 PROCEDURE — 73562 X-RAY EXAM OF KNEE 3: CPT | Mod: RT

## 2024-05-29 ENCOUNTER — NON-APPOINTMENT (OUTPATIENT)
Age: 74
End: 2024-05-29

## 2024-05-29 ENCOUNTER — APPOINTMENT (OUTPATIENT)
Dept: OBGYN | Facility: CLINIC | Age: 74
End: 2024-05-29

## 2024-05-29 VITALS — DIASTOLIC BLOOD PRESSURE: 76 MMHG | HEART RATE: 70 BPM | SYSTOLIC BLOOD PRESSURE: 174 MMHG | HEIGHT: 65 IN

## 2024-05-29 DIAGNOSIS — N81.11 CYSTOCELE, MIDLINE: ICD-10-CM

## 2024-05-29 DIAGNOSIS — R35.1 NOCTURIA: ICD-10-CM

## 2024-05-29 DIAGNOSIS — N32.81 OVERACTIVE BLADDER: ICD-10-CM

## 2024-05-29 DIAGNOSIS — N95.2 POSTMENOPAUSAL ATROPHIC VAGINITIS: ICD-10-CM

## 2024-05-29 PROCEDURE — 64566 NEUROELTRD STIM POST TIBIAL: CPT

## 2024-05-29 PROCEDURE — 99214 OFFICE O/P EST MOD 30 MIN: CPT | Mod: 25

## 2024-05-29 RX ORDER — VIBEGRON 75 MG/1
75 TABLET, FILM COATED ORAL
Qty: 90 | Refills: 2 | Status: ACTIVE | COMMUNITY
Start: 2024-05-29

## 2024-05-31 NOTE — HISTORY OF PRESENT ILLNESS
[FreeTextEntry1] : Patient presents with several years of urgency frequency every 2 hours occasionally is able to hold up to 3 hours and occasionally has trouble making the 2-hour rex.  She is status post GALO/BSO in the past for menorrhagia according to the patient this was benign and was done at [place] [facility] robotically.  She followed up gynecologically with Dr. Garcia but has not had a GYN exam evaluation or mammogram in 2 years since 2020.  She does not have any leakage per se or prolapse symptoms and she does have nocturia up to 3 times per night.  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

## 2024-05-31 NOTE — PROCEDURE
[Appropriate Reflexes Elicited] : appropriate reflexes were not elicited [Treatment cycle completed] : the treatment cycle was not completed [Adverse Reactions] : no adverse reactions, [FreeTextEntry1] : #booster may 29, 2024 level #5 left leg

## 2024-05-31 NOTE — COUNSELING
[FreeTextEntry1] : UPDATE may 29, 2024 Lali 73-year-old para 2-0-1-2 presents for RX OAB FOLLOW UP post GALO/BSO for benign disease (menorrhagia) Urinalysis culture and cytology nl 2023, postvoid residual is normal at the last visit we will check at the next visit -enough for ua only 11/13/23 will try for uc and cytol also nv july 2024 PEX nv july 2024 Mammogram normal 2023 rpt 2024 Bone density is up-to-date rpt 2025 Renal ultrasound ordered because of the history of the prior hysterectomy to rule out upper tract disease-normal 2022 RPT AS CLINICALLY INDICATED summer 2024 Pap N/A due to prior complete hysterectomy colonoscopy-normal per pt w/in past month  Regarding her urinary frequency and urgency without actual leakage in all likelihood she has dry OAB 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, prior surgery, multiparity, and very decreased or absent pelvic floor contraction ability- Treatment options include doing nothing, diet and lifestyle changes, physical therapy, pessary, medications, posterior tibial nerve stimulation or surgery to correct the cystocele.  She chooses diet and lifestyle changes including an elimination diet of artificial sweetener diet Coke diet sodas, coffee, spicy food, artificial sweeteners, chocolate, alcohol and she will add back 1 of these after a 2-week period of abstinence 1 at a time to try to identify her food trigger or liquid trigger-1/22/24-has deemed that carbonated beverages are a major trigger and reduction of elimination of these have helped tremendously reduce her OAB  She also has chosen in the past physical therapy referral was made to [facility] to help her identify her pelvic floor musculature and hopefully be able to contract her pelvic floor effectively thereby reducing urethrovesical FUNNEL < udfr-declined this after 2 treatments 'I'd rather leak' and 'too humiliating' etc.   Additionally she will try a bit of weight loss, void every 2 hours while she is awake, space out her fluid intake to 4-hour maximum an hour with a total of no more than 70 to 80 ounces of total fluid per day  And for now she chooses posterior tibial nerve stimulation in addition to the above.(STARTED 6/13/22 [facility])-7/24/23_< oab and now can wait 3-4 hours at night between voids-session today [facility] (finds if she waits > 4 -5 weeks >oab, will come in monthly for boosters now.-gemtesa working well qd < noctura and < oab-working very well as of today 5/29/24  Appropriate referrals were made, literature was provided The patient had the opportunity to ask questions which were answered to her apparent satisfaction and 3D and computer modeling were utilized to explain the patient's situation to her  Follow-up appointments were made and pex at one year rex/annual  Mammogram breast ultrasound is due this summer after July 30 and a bone density is not due until the summer 2025 MATT

## 2024-07-08 ENCOUNTER — APPOINTMENT (OUTPATIENT)
Dept: OBGYN | Facility: CLINIC | Age: 74
End: 2024-07-08
Payer: MEDICARE

## 2024-07-08 ENCOUNTER — NON-APPOINTMENT (OUTPATIENT)
Age: 74
End: 2024-07-08

## 2024-07-08 DIAGNOSIS — N95.2 POSTMENOPAUSAL ATROPHIC VAGINITIS: ICD-10-CM

## 2024-07-08 DIAGNOSIS — N32.81 OVERACTIVE BLADDER: ICD-10-CM

## 2024-07-08 DIAGNOSIS — N81.11 CYSTOCELE, MIDLINE: ICD-10-CM

## 2024-07-08 DIAGNOSIS — R35.1 NOCTURIA: ICD-10-CM

## 2024-07-08 PROCEDURE — 99214 OFFICE O/P EST MOD 30 MIN: CPT | Mod: 25

## 2024-07-08 PROCEDURE — 99459 PELVIC EXAMINATION: CPT

## 2024-07-08 PROCEDURE — 64566 NEUROELTRD STIM POST TIBIAL: CPT

## 2024-07-29 ENCOUNTER — APPOINTMENT (OUTPATIENT)
Dept: MAMMOGRAPHY | Facility: CLINIC | Age: 74
End: 2024-07-29
Payer: MEDICARE

## 2024-07-29 ENCOUNTER — APPOINTMENT (OUTPATIENT)
Dept: OBGYN | Facility: CLINIC | Age: 74
End: 2024-07-29
Payer: MEDICARE

## 2024-07-29 ENCOUNTER — APPOINTMENT (OUTPATIENT)
Dept: ULTRASOUND IMAGING | Facility: CLINIC | Age: 74
End: 2024-07-29
Payer: MEDICARE

## 2024-07-29 ENCOUNTER — RESULT REVIEW (OUTPATIENT)
Age: 74
End: 2024-07-29

## 2024-07-29 ENCOUNTER — APPOINTMENT (OUTPATIENT)
Dept: ULTRASOUND IMAGING | Facility: CLINIC | Age: 74
End: 2024-07-29

## 2024-07-29 DIAGNOSIS — R35.1 NOCTURIA: ICD-10-CM

## 2024-07-29 DIAGNOSIS — N81.11 CYSTOCELE, MIDLINE: ICD-10-CM

## 2024-07-29 DIAGNOSIS — N95.2 POSTMENOPAUSAL ATROPHIC VAGINITIS: ICD-10-CM

## 2024-07-29 DIAGNOSIS — N32.81 OVERACTIVE BLADDER: ICD-10-CM

## 2024-07-29 PROCEDURE — 77063 BREAST TOMOSYNTHESIS BI: CPT

## 2024-07-29 PROCEDURE — 64566 NEUROELTRD STIM POST TIBIAL: CPT

## 2024-07-29 PROCEDURE — 76641 ULTRASOUND BREAST COMPLETE: CPT | Mod: 50,GY

## 2024-07-29 PROCEDURE — 99214 OFFICE O/P EST MOD 30 MIN: CPT | Mod: 25

## 2024-07-29 PROCEDURE — 76770 US EXAM ABDO BACK WALL COMP: CPT

## 2024-07-29 PROCEDURE — 77067 SCR MAMMO BI INCL CAD: CPT

## 2024-07-30 LAB
APPEARANCE: CLEAR
BACTERIA: NEGATIVE /HPF
BILIRUBIN URINE: NEGATIVE
BLOOD URINE: NEGATIVE
CAST: 0 /LPF
COLOR: YELLOW
EPITHELIAL CELLS: 2 /HPF
GLUCOSE QUALITATIVE U: NEGATIVE MG/DL
KETONES URINE: ABNORMAL MG/DL
LEUKOCYTE ESTERASE URINE: ABNORMAL
MICROSCOPIC-UA: NORMAL
NITRITE URINE: NEGATIVE
PH URINE: 5.5
PROTEIN URINE: NEGATIVE MG/DL
RED BLOOD CELLS URINE: 1 /HPF
SPECIFIC GRAVITY URINE: 1.03
URINE CYTOLOGY: NORMAL
UROBILINOGEN URINE: 1 MG/DL
WHITE BLOOD CELLS URINE: 4 /HPF

## 2024-07-31 LAB — BACTERIA UR CULT: NORMAL

## 2024-08-06 NOTE — COUNSELING
[Fall prevention counseling provided] : Fall prevention counseling provided [Adequate lighting] : Adequate lighting [No throw rugs] : No throw rugs [Use proper foot wear] : Use proper foot wear [Behavioral health counseling provided] : Behavioral health counseling provided [Sleep ___ hours/day] : Sleep [unfilled] hours/day [Engage in a relaxing activity] : Engage in a relaxing activity [Plan in advance] : Plan in advance [AUDIT-C Screening administered and reviewed] : AUDIT-C Screening administered and reviewed [None] : None [Good understanding] : Patient has a good understanding of lifestyle changes and steps needed to achieve self management goal [Chaperone Present] : A chaperone was present in the examining room during all aspects of the physical examination [93766] : A chaperone was present during the pelvic exam. [FreeTextEntry2] : Shameka [Appropriately responsive] : appropriately responsive [Alert] : alert [No Acute Distress] : no acute distress [Soft] : soft [Non-tender] : non-tender [Non-distended] : non-distended [No HSM] : No HSM [No Lesions] : no lesions [No Mass] : no mass [Oriented x3] : oriented x3 [Examination Of The Breasts] : a normal appearance [No Masses] : no breast masses were palpable [Labia Majora] : normal [Labia Minora] : normal [Normal] : normal [Uterine Adnexae] : normal

## 2024-08-26 ENCOUNTER — APPOINTMENT (OUTPATIENT)
Dept: OBGYN | Facility: CLINIC | Age: 74
End: 2024-08-26
Payer: MEDICARE

## 2024-08-26 VITALS
SYSTOLIC BLOOD PRESSURE: 149 MMHG | WEIGHT: 161 LBS | HEART RATE: 87 BPM | DIASTOLIC BLOOD PRESSURE: 73 MMHG | BODY MASS INDEX: 26.82 KG/M2 | HEIGHT: 65 IN

## 2024-08-26 DIAGNOSIS — N81.11 CYSTOCELE, MIDLINE: ICD-10-CM

## 2024-08-26 DIAGNOSIS — N32.81 OVERACTIVE BLADDER: ICD-10-CM

## 2024-08-26 DIAGNOSIS — N95.2 POSTMENOPAUSAL ATROPHIC VAGINITIS: ICD-10-CM

## 2024-08-26 DIAGNOSIS — R35.1 NOCTURIA: ICD-10-CM

## 2024-08-26 PROCEDURE — 99214 OFFICE O/P EST MOD 30 MIN: CPT | Mod: 25

## 2024-08-26 PROCEDURE — 64566 NEUROELTRD STIM POST TIBIAL: CPT

## 2024-09-25 ENCOUNTER — APPOINTMENT (OUTPATIENT)
Dept: FAMILY MEDICINE | Facility: CLINIC | Age: 74
End: 2024-09-25
Payer: MEDICARE

## 2024-09-25 ENCOUNTER — EMERGENCY (EMERGENCY)
Facility: HOSPITAL | Age: 74
LOS: 1 days | Discharge: HOME | End: 2024-09-25
Attending: STUDENT IN AN ORGANIZED HEALTH CARE EDUCATION/TRAINING PROGRAM | Admitting: STUDENT IN AN ORGANIZED HEALTH CARE EDUCATION/TRAINING PROGRAM
Payer: MEDICARE

## 2024-09-25 VITALS
HEART RATE: 72 BPM | SYSTOLIC BLOOD PRESSURE: 144 MMHG | OXYGEN SATURATION: 99 % | RESPIRATION RATE: 17 BRPM | TEMPERATURE: 97.3 F | DIASTOLIC BLOOD PRESSURE: 76 MMHG | BODY MASS INDEX: 26.49 KG/M2 | WEIGHT: 159 LBS | HEIGHT: 65 IN

## 2024-09-25 VITALS
HEART RATE: 65 BPM | DIASTOLIC BLOOD PRESSURE: 82 MMHG | TEMPERATURE: 98 F | HEIGHT: 66 IN | RESPIRATION RATE: 18 BRPM | SYSTOLIC BLOOD PRESSURE: 143 MMHG | OXYGEN SATURATION: 96 % | WEIGHT: 158.07 LBS

## 2024-09-25 DIAGNOSIS — R19.7 DIARRHEA, UNSPECIFIED: ICD-10-CM

## 2024-09-25 DIAGNOSIS — R10.9 UNSPECIFIED ABDOMINAL PAIN: ICD-10-CM

## 2024-09-25 DIAGNOSIS — Z96.659 PRESENCE OF UNSPECIFIED ARTIFICIAL KNEE JOINT: Chronic | ICD-10-CM

## 2024-09-25 DIAGNOSIS — Z98.890 OTHER SPECIFIED POSTPROCEDURAL STATES: Chronic | ICD-10-CM

## 2024-09-25 LAB
ALBUMIN SERPL ELPH-MCNC: 3 G/DL — LOW (ref 3.3–5)
ALP SERPL-CCNC: 109 U/L — SIGNIFICANT CHANGE UP (ref 40–120)
ALT FLD-CCNC: 25 U/L — SIGNIFICANT CHANGE UP (ref 10–45)
ANION GAP SERPL CALC-SCNC: 7 MMOL/L — SIGNIFICANT CHANGE UP (ref 5–17)
APPEARANCE UR: CLEAR — SIGNIFICANT CHANGE UP
AST SERPL-CCNC: 14 U/L — SIGNIFICANT CHANGE UP (ref 10–40)
BASOPHILS # BLD AUTO: 0.07 K/UL — SIGNIFICANT CHANGE UP (ref 0–0.2)
BASOPHILS NFR BLD AUTO: 0.8 % — SIGNIFICANT CHANGE UP (ref 0–2)
BILIRUB SERPL-MCNC: 0.3 MG/DL — SIGNIFICANT CHANGE UP (ref 0.2–1.2)
BILIRUB UR-MCNC: NEGATIVE — SIGNIFICANT CHANGE UP
BUN SERPL-MCNC: 13 MG/DL — SIGNIFICANT CHANGE UP (ref 7–23)
CALCIUM SERPL-MCNC: 9.7 MG/DL — SIGNIFICANT CHANGE UP (ref 8.4–10.5)
CHLORIDE SERPL-SCNC: 103 MMOL/L — SIGNIFICANT CHANGE UP (ref 96–108)
CO2 SERPL-SCNC: 32 MMOL/L — HIGH (ref 22–31)
COLOR SPEC: YELLOW — SIGNIFICANT CHANGE UP
CREAT SERPL-MCNC: 0.75 MG/DL — SIGNIFICANT CHANGE UP (ref 0.5–1.3)
DIFF PNL FLD: NEGATIVE — SIGNIFICANT CHANGE UP
EGFR: 84 ML/MIN/1.73M2 — SIGNIFICANT CHANGE UP
EOSINOPHIL # BLD AUTO: 0.22 K/UL — SIGNIFICANT CHANGE UP (ref 0–0.5)
EOSINOPHIL NFR BLD AUTO: 2.6 % — SIGNIFICANT CHANGE UP (ref 0–6)
GLUCOSE SERPL-MCNC: 107 MG/DL — HIGH (ref 70–99)
GLUCOSE UR QL: NEGATIVE MG/DL — SIGNIFICANT CHANGE UP
HCT VFR BLD CALC: 36.9 % — SIGNIFICANT CHANGE UP (ref 34.5–45)
HGB BLD-MCNC: 12.7 G/DL — SIGNIFICANT CHANGE UP (ref 11.5–15.5)
IMM GRANULOCYTES NFR BLD AUTO: 0.8 % — SIGNIFICANT CHANGE UP (ref 0–0.9)
KETONES UR-MCNC: NEGATIVE MG/DL — SIGNIFICANT CHANGE UP
LEUKOCYTE ESTERASE UR-ACNC: NEGATIVE — SIGNIFICANT CHANGE UP
LIDOCAIN IGE QN: 54 U/L — SIGNIFICANT CHANGE UP (ref 16–77)
LYMPHOCYTES # BLD AUTO: 2.36 K/UL — SIGNIFICANT CHANGE UP (ref 1–3.3)
LYMPHOCYTES # BLD AUTO: 28 % — SIGNIFICANT CHANGE UP (ref 13–44)
MCHC RBC-ENTMCNC: 31.7 PG — SIGNIFICANT CHANGE UP (ref 27–34)
MCHC RBC-ENTMCNC: 34.4 GM/DL — SIGNIFICANT CHANGE UP (ref 32–36)
MCV RBC AUTO: 92 FL — SIGNIFICANT CHANGE UP (ref 80–100)
MONOCYTES # BLD AUTO: 0.66 K/UL — SIGNIFICANT CHANGE UP (ref 0–0.9)
MONOCYTES NFR BLD AUTO: 7.8 % — SIGNIFICANT CHANGE UP (ref 2–14)
NEUTROPHILS # BLD AUTO: 5.06 K/UL — SIGNIFICANT CHANGE UP (ref 1.8–7.4)
NEUTROPHILS NFR BLD AUTO: 60 % — SIGNIFICANT CHANGE UP (ref 43–77)
NITRITE UR-MCNC: NEGATIVE — SIGNIFICANT CHANGE UP
NRBC # BLD: 0 /100 WBCS — SIGNIFICANT CHANGE UP (ref 0–0)
PH UR: 6 — SIGNIFICANT CHANGE UP (ref 5–8)
PLATELET # BLD AUTO: 416 K/UL — HIGH (ref 150–400)
POTASSIUM SERPL-MCNC: 4.9 MMOL/L — SIGNIFICANT CHANGE UP (ref 3.5–5.3)
POTASSIUM SERPL-SCNC: 4.9 MMOL/L — SIGNIFICANT CHANGE UP (ref 3.5–5.3)
PROT SERPL-MCNC: 7.1 G/DL — SIGNIFICANT CHANGE UP (ref 6–8.3)
PROT UR-MCNC: NEGATIVE MG/DL — SIGNIFICANT CHANGE UP
RBC # BLD: 4.01 M/UL — SIGNIFICANT CHANGE UP (ref 3.8–5.2)
RBC # FLD: 12.1 % — SIGNIFICANT CHANGE UP (ref 10.3–14.5)
SODIUM SERPL-SCNC: 142 MMOL/L — SIGNIFICANT CHANGE UP (ref 135–145)
SP GR SPEC: 1.02 — SIGNIFICANT CHANGE UP (ref 1–1.03)
UROBILINOGEN FLD QL: 1 MG/DL — SIGNIFICANT CHANGE UP (ref 0.2–1)
WBC # BLD: 8.44 K/UL — SIGNIFICANT CHANGE UP (ref 3.8–10.5)
WBC # FLD AUTO: 8.44 K/UL — SIGNIFICANT CHANGE UP (ref 3.8–10.5)

## 2024-09-25 PROCEDURE — 36415 COLL VENOUS BLD VENIPUNCTURE: CPT

## 2024-09-25 PROCEDURE — 83690 ASSAY OF LIPASE: CPT

## 2024-09-25 PROCEDURE — 81003 URINALYSIS AUTO W/O SCOPE: CPT

## 2024-09-25 PROCEDURE — 96374 THER/PROPH/DIAG INJ IV PUSH: CPT | Mod: XU

## 2024-09-25 PROCEDURE — 85025 COMPLETE CBC W/AUTO DIFF WBC: CPT

## 2024-09-25 PROCEDURE — 99205 OFFICE O/P NEW HI 60 MIN: CPT

## 2024-09-25 PROCEDURE — 74177 CT ABD & PELVIS W/CONTRAST: CPT | Mod: MC

## 2024-09-25 PROCEDURE — 99285 EMERGENCY DEPT VISIT HI MDM: CPT

## 2024-09-25 PROCEDURE — 74177 CT ABD & PELVIS W/CONTRAST: CPT | Mod: 26,MC

## 2024-09-25 PROCEDURE — 80053 COMPREHEN METABOLIC PANEL: CPT

## 2024-09-25 PROCEDURE — 99284 EMERGENCY DEPT VISIT MOD MDM: CPT | Mod: 25

## 2024-09-25 RX ORDER — KETOROLAC TROMETHAMINE 30 MG/ML
15 INJECTION, SOLUTION INTRAMUSCULAR ONCE
Refills: 0 | Status: DISCONTINUED | OUTPATIENT
Start: 2024-09-25 | End: 2024-09-25

## 2024-09-25 RX ORDER — METRONIDAZOLE 250 MG
1 TABLET ORAL
Qty: 21 | Refills: 0
Start: 2024-09-25 | End: 2024-10-01

## 2024-09-25 RX ORDER — DICYCLOMINE HCL 20 MG
20 TABLET ORAL ONCE
Refills: 0 | Status: COMPLETED | OUTPATIENT
Start: 2024-09-25 | End: 2024-09-25

## 2024-09-25 RX ORDER — SODIUM CHLORIDE 9 MG/ML
1000 INJECTION INTRAMUSCULAR; INTRAVENOUS; SUBCUTANEOUS ONCE
Refills: 0 | Status: COMPLETED | OUTPATIENT
Start: 2024-09-25 | End: 2024-09-25

## 2024-09-25 RX ADMIN — Medication 20 MILLIGRAM(S): at 10:45

## 2024-09-25 RX ADMIN — SODIUM CHLORIDE 1000 MILLILITER(S): 9 INJECTION INTRAMUSCULAR; INTRAVENOUS; SUBCUTANEOUS at 10:45

## 2024-09-25 RX ADMIN — KETOROLAC TROMETHAMINE 15 MILLIGRAM(S): 30 INJECTION, SOLUTION INTRAMUSCULAR at 10:45

## 2024-09-25 NOTE — HISTORY OF PRESENT ILLNESS
[FreeTextEntry8] : cc: abd pain, diarrhea   Pt presented c/o diarrhea  started 3 weeks ago, still here denies blood in stool ( 3-4 BM), no fever, no chills, no SOB,CP, pt is cramping - 9/10, Pt  just retuned from [place]. - was 3 weeks, nobody was sick, COVID and FLU negative checked in [place]. Pt had last Colon 08/24 - not sure if she has had diverticulosis

## 2024-09-25 NOTE — ED ADULT NURSE NOTE - OBJECTIVE STATEMENT
73 year old Female comes to the ER with abdominal pain. Patient reports diarrhea, no nausea or vomiting. Has not experienced this before. Has been diagnosed with diverticulitis. Patient alert and oriented, breathing spontaneous and unlabored, moving all extremities, steady gait noted. Bed locked and in lowest position for safety.

## 2024-09-25 NOTE — ED PROVIDER NOTE - CLINICAL SUMMARY MEDICAL DECISION MAKING FREE TEXT BOX
73-year-old female history of anxiety and depression, no past surgical history presents with left lower quadrant pain.  Patient admits having intermittent nonbloody nonbilious episodes of diarrhea over the last 3 weeks while she was in [place].  Returned recently still having intermittent sharp left lower quadrant pain.  Went to her primary care doctor today and advised to come into the ED.  Does have history of diverticulosis.  Denies taking any over-the-counter medication for pain.  Denies fevers chills nausea vomiting sick contacts recent antibiotic use  Exam as stated  Plan for labs, ct, meds  DDX includes but not limited to diverticulitis, PUD, viral gastroenteritis, appy

## 2024-09-25 NOTE — ED PROVIDER NOTE - PHYSICAL EXAMINATION
CONSTITUTIONAL: NAD  SKIN: Warm dry  HEAD: NCAT  EYES: NL inspection  ENT: MMM  NECK: Supple; non tender.  CARD: RRR  RESP: CTAB  ABD: soft, +LLQ ttp, no R/G  EXT: no pedal edema  NEURO: Grossly unremarkable  PSYCH: Cooperative, appropriate.

## 2024-09-25 NOTE — ED ADULT TRIAGE NOTE - CHIEF COMPLAINT QUOTE
Patient sent to ED from Dr. Guerrero's office for evaluation for possible diverticulitis. Patient notes LLQ pain for a few days, plus intermittent diarrhea and abdominal pain for 3 weeks.

## 2024-09-25 NOTE — ED ADULT NURSE NOTE - NSFALLUNIVINTERV_ED_ALL_ED
Bed/Stretcher in lowest position, wheels locked, appropriate side rails in place/Call bell, personal items and telephone in reach/Instruct patient to call for assistance before getting out of bed/chair/stretcher/Non-slip footwear applied when patient is off stretcher/[place] to call system/Physically safe environment - no spills, clutter or unnecessary equipment/Purposeful proactive rounding/Room/bathroom lighting operational, light cord in reach

## 2024-09-25 NOTE — ASSESSMENT
[FreeTextEntry1] : I explained to the pt re: importance to go to ED re: further eval and treatment  - pt agree  patient was referred to ED  GC  r/o Acute diverticulitis , case d/w ED Attanding

## 2024-09-25 NOTE — ED PROVIDER NOTE - OBJECTIVE STATEMENT
73-year-old female history of anxiety and depression, no past surgical history presents with left lower quadrant pain.  Patient admits having intermittent nonbloody nonbilious episodes of diarrhea over the last 3 weeks while she was in [place].  Returned recently still having intermittent sharp left lower quadrant pain.  Went to her primary care doctor today and advised to come into the ED.  Does have history of diverticulosis.  Denies taking any over-the-counter medication for pain.  Denies fevers chills nausea vomiting sick contacts recent antibiotic use

## 2024-09-25 NOTE — ED PROVIDER NOTE - PROVIDER TOKENS
PROVIDER:[TOKEN:[[id number]],FOLLOWUP:[7-10 Days]] PROVIDER:[TOKEN:[[id number]],FOLLOWUP:[7-10 Days]],PROVIDER:[TOKEN:[[id number]],FOLLOWUP:[7-10 Days]],PROVIDER:[TOKEN:[[id number]],FOLLOWUP:[7-10 Days]]

## 2024-09-25 NOTE — ED PROVIDER NOTE - CARE PROVIDERS DIRECT ADDRESSES
,[email or web address]

## 2024-09-25 NOTE — ED PROVIDER NOTE - PATIENT PORTAL LINK FT
previous_biopsy_has_been_previously_biopsied Body Location Override (Optional): Left distal calf You can access the FollowMyHealth Patient Portal offered by [facility] by registering at the following website: [email or web address]. By joining [facility]’s FollowMyHealth portal, you will also be able to view your health information using other applications (apps) compatible with our system.

## 2024-09-25 NOTE — PHYSICAL EXAM
[Normal Outer Ear/Nose] : the outer ears and nose were normal in appearance [No Edema] : there was no peripheral edema [Soft] : abdomen soft [No HSM] : no HSM [No Joint Swelling] : no joint swelling [No Rash] : no rash [Normal] : normal gait, coordination grossly intact, no focal deficits and deep tendon reflexes were 2+ and symmetric [Alert and Oriented x3] : oriented to person, place, and time [de-identified] : uncomfoprtable  [de-identified] : dry [place]coe [de-identified] : + guarding, NR, LLQ tenderness

## 2024-09-25 NOTE — REVIEW OF SYSTEMS
[Abdominal Pain] : abdominal pain [Nausea] : nausea [Constipation] : no constipation [Diarrhea] : diarrhea [Vomiting] : no vomiting [Heartburn] : no heartburn [Melena] : no melena [Negative] : Integumentary

## 2024-09-25 NOTE — ED PROVIDER NOTE - NSFOLLOWUPINSTRUCTIONS_ED_ALL_ED_FT
CARDIOLOGY FOLLOW-UP VISIT   10/31/23         Patient Name: Vanna Solano      : 1945   MRN: [id number]     PCP: Carri Cunningham MD   PCP phone: [phone]          Chief Complaint: Office Visit (1 year f/u)       HISTORY OF PRESENT ILLNESS     Vanna Solano is a 77 year old year old female with PMH as described below who is presenting to the clinic today for the following reason(s) or complaint(s): Office Visit (1 year f/u)    Today the patient presents to clinic unaccompanied for a 1 year follow up visit. She reports feeling constantly fatigued and feels like sleeping during the day. She has to sit for 10 minutes after showering until she has enough energy to continue with daily activities. She notes that she has had these symptoms for some time but feels they are somewhat worse. She feels short of breath with extended periods of walking and has done home EKG's which were totally normal. She also reports having an erratic sleep pattern, and some night she cannot fall asleep before 3 AM. Her sleep is inconsistent and she has difficulty falling asleep often. She confirms being compliant with her current medication regimen which she is tolerating well. Her  has told her she snores at night and has paroxysmal nocturnal dyspnea. Patient does not have any additional complaints at this time.     RECENT HOSPITALIZATION(S)     N/A    REVIEW OF SYSTEMS     All systems reviewed, pertinent positive as mentioned in HPI.    PAST MEDICAL HISTORY     She  has a past medical history of Anxiety, Coronary artery disease, Depression, Essential (primary) hypertension, H/O transfusion of packed red blood cells, IBS (irritable bowel syndrome), Multiple sclerosis (CMD), Myocardial infarction (CMD), and Thyroid condition.     FAMILY, SOCIAL, & MEDICAL HISTORY     Family History: Her family history includes Heart disease in her brother, father, mother, sister, and sister; Hypertension in her brother, father, mother, sister, and  sister.     Social History: She  reports that she quit smoking about 29 years ago. Her smoking use included cigarettes. She has never used smokeless tobacco. She reports that she does not drink alcohol and does not use drugs.     Medical History:   Past Medical History:   Diagnosis Date   • Anxiety    • Coronary artery disease    • Depression    • Essential (primary) hypertension    • H/O transfusion of packed red blood cells     prior to hyst due to excessive bleeding   • IBS (irritable bowel syndrome)    • Multiple sclerosis (CMD)    • Myocardial infarction (CMD)    • Thyroid condition     hypothyroidism      Family, Social, & Medical history have all been reviewed and updated.    CURRENT MEDICATIONS     Current Outpatient Medications   Medication Sig Dispense Refill   • atorvastatin (LIPITOR) 40 MG tablet TAKE 1 TABLET BY MOUTH EVERY NIGHT 90 tablet 2   • carvedilol (COREG) 6.25 MG tablet TAKE 1 TABLET BY MOUTH TWICE DAILY WITH MEALS 60 tablet 6   • Brilinta 60 MG tablet TAKE 1 TABLET BY MOUTH TWICE DAILY 180 tablet 3   • isosorbide mononitrate (IMDUR) 30 MG 24 hr tablet TAKE 1 TABLET BY MOUTH DAILY 90 tablet 3   • furosemide (LASIX) 20 MG tablet TAKE 1 TABLET BY MOUTH DAILY 30 tablet 3   • escitalopram (LEXAPRO) 10 MG tablet Take 10 mg by mouth daily.     • nitroGLYcerin (NITROSTAT) 0.4 MG sublingual tablet Place 1 tablet under the tongue every 5 minutes as needed for Chest pain. 25 tablet 1   • Wheat Dextrin (BENEFIBER PO)      • losartan (COZAAR) 50 MG tablet Take 1 tablet by mouth daily. (Patient taking differently: Take 50 mg by mouth in the morning and 50 mg in the evening.) 30 tablet 6   • divalproex (DEPAKOTE) 250 MG EC tablet 1 Tablet in the morning, 1 tablet in the afternoon, and 2 tablets at bedtime. NEEDS TO SCHEDULE AN APPOINTMENT FOR ANY MORE REFILLS. (Patient taking differently: Take 250 mg by mouth in the morning and 250 mg in the evening.) 50 tablet 0   • Cholecalciferol (VITAMIN D) 2000 units  tablet Take 2,000 Units by mouth nightly.     • ALPRAZolam (XANAX) 0.5 MG tablet Take 0.5 mg by mouth daily as needed. Indications: Feeling Anxious     • buPROPion (WELLBUTRIN XL) 150 MG 24 hr tablet Take 150 mg by mouth 2 times daily.      • levothyroxine (SYNTHROID, LEVOTHROID) 75 MCG tablet Take 75 mcg by mouth daily.       Current Facility-Administered Medications   Medication Dose Route Frequency Provider Last Rate Last Admin   • DOBUTamine (DOBUTREX) 250 mg in sodium chloride 0.9 % 250 mL infusion   Intravenous Once Ney Zurita MD   Completed at 10/02/20 1515     Facility-Administered Medications Ordered in Other Visits   Medication Dose Route Frequency Provider Last Rate Last Admin   • iopamidol (ISOVUE-370) 76 % injection    PRN Ney Zurita MD   175 mL at 04/01/17 0835        PHYSICAL EXAM         1/4/2022     1:57 PM 3/8/2022     3:00 PM 9/12/2022    11:08 AM 8/31/2023     3:34 PM 10/31/2023    10:20 AM   Vitals   SYSTOLIC 156 146 120 138 134   DIASTOLIC 80 70 64 60 82   Heart Rate  89 88 81 93   Temp    97.3 °F (36.3 °C)    Resp   16 14    Weight kg  78.926 kg 78.654 kg 78.926 kg 78.926 kg   Height  5' 0\" 5' 0\" 5' 0\" 5' 0\"   BMI (Calculated)  33.98 33.86 33.98 33.98     General : No acute distress  HEENT: PERRL, Normocephalic/atraumatic, clear oropharynx  Neck: Supple, FROM. Trachea central. No JVD (jugular vein distention) or carotid bruit.  CVS: S1, S2 normal. No M/R/G  Pulm: Clear to auscultation/percussion. No Wheeze/Rhonchi/Rales  Ext: No cyanosis/clubbing/edema  Skin: No rash/palpable nodules    LABS     No results for input(s): \"WBC\", \"RBC\", \"HGB\", \"HCT\", \"PLT\", \"SODIUM\", \"POTASSIUM\", \"CHLORIDE\", \"CO2\", \"BUN\", \"CREATININE\", \"GFRESTIMATE\", \"BCRAT\", \"HTROPI\", \"NTPROB\", \"BNP\", \"TSH\", \"HGBA1C\", \"GLUCOSE\", \"CHOLESTEROL\", \"HDL\", \"CHOHDL\", \"LDLDIR\", \"CALCLDL\", \"TRIGLYCERIDE\", \"CRP\", \"RESR\", \"INR\" in the last 8765 hours.   Additional Labs: N/A    Risk Scores     [id number] Score CHADSVASc  Stroke Risk Score = 5      ASCVD Score The ASCVD Risk score (Pablo BLACKWELL, et al., 2019) failed to calculate for the following reasons:    The patient has a prior MI or stroke diagnosis      HAS-BLED Score Epic Calculated HASBLED Score = 4      Chest Pain Score   N/A   DAPT Score   N/A     Invasive/Non-Invasive Studies     Catheterization   Cardiac Catheterization 4/2017:  1. Severe Mid RCA disease S/P PCI with a 2.5x16 promus LOBO  2. PDA 70-75% , medical management   3. Indication NSTEMI  4. Normal LVEF     Cardiac Stress  NM stress test 6/19/2019:  1.  No evidence of significant myocardial ischemia or prior infarction seen on perfusion imaging.  2.  Well-preserved left ventricular cavity size and systolic function as described above.    Echo stress test 10/2/2020  No evidence of myocardial ischemia with Dobutamine stress.     Echo  Echo 6/3/2019:  Normal left ventricular size and systolic function.  Grade I/IV diastolic dysfunction (abnormal relaxation filling pattern), normal to mildly elevated filling pressures.  Normal right ventricular size and systolic function, RVSP 22.8 mmHg.  No significant valve abnormalities    Echo 7/30/2020  Normal left ventricular size, systolic function and wall thickness, with no regional wall motion abnormalities.  Left ventricular ejection fraction, 73 %.  Normal right ventricular size and systolic function, RVSP 25 mmHg.  Mild mitral valve regurgitation.  Mild tricuspid valve regurgitation.     EKG   N/A     CT/MRI   N/A     Ultrasound   US Upper Extremity Venous Duplex Left 01/11/2022  Impression:  Negative for acute deep vein thrombosis.    US LE Venous Duplex Right 3/16/2022  IMPRESSION:  Negative for DVT, right lower extremity.    US MONICA 8/18/2022  Right:   Brachial: 145   Tibialis posterior: 226 (MONICA: 1.56)   Dorsalis pedis: 201 (MONICA: 1.39)     Left:   Brachial: 139   Tibialis posterior: 176 (MONICA: 1.21)   Dorsalis pedis: 157 (MONICA: 1.08)     Additional Testing  N/A        ASSESSMENT     1. Coronary Artery Disease: History of NSTEMI with PCI of the mid-RCA using a 2.5x16 mm Promus LOBO on 4/01/2017. Normal stress test in 2020. Currently on brilinta 60 mg BID, atorvastatin 40 mg daily, and Imdur 30 mg daily.      2. Hypertension: Stable, /82 today in clinic. Currently on Coreg  6.25mg BID and losartan 50 mg BID.       3. Dyslipidemia: CHOL 128, , HDL 60, LDL 45 as of 02/11/2023. On atorvastatin 40 mg nightly.    4.  Left Upper Extremity lymphedema: Improving     5. Fatigue: Patient reported difficulty falling asleep and irregular sleep schedule.    PLAN     -Service to sleep medicine.  -Consider decreasing psychiatric medications.   -Recommended patient improve sleep environment if possible to reduce interruptions and improve sleep/fatigue.   -Echocardiogram for reassessment due to reported shortness of breath.  -Otherwise, continue current medication regimen and risk factor modification.  -Follow up in 6 months.     Return in about 6 months (around 4/30/2024). Call our office as needed if symptoms worsen, fail to improve as anticipated, or if new symptoms develop.    On 10/31/2023, Justice ROBERTS scribed the services personally performed by Ney Zurita MD    I have reviewed and edited the visit summary above and attest that it is accurate.         Diverticulitis    Diverticulitis is inflammation or infection of small pouches in your colon that form when you have a condition called diverticulosis. This condition can range from mild to severe potentially leading to perforation or obstructions of your colon. Symptoms include abdominal pain, fever/chills, nausea, vomiting, diarrhea, constipation, or blood in your stool. If you were prescribed an antibiotic medicine, take it as told by your health care provider. Do not stop taking the antibiotic even if you start to feel better.    SEEK IMMEDIATE MEDICAL CARE IF YOU HAVE THE FOLLOWING SYMPTOMS: worsening abdominal pain, high fever, inability to hold down liquids or medication, black or bloody stools, inability to pass gas, lightheadedness/dizziness, or a change in mental status. Diverticulitis    Diverticulitis is inflammation or infection of small pouches in your colon that form when you have a condition called diverticulosis. This condition can range from mild to severe potentially leading to perforation or obstructions of your colon. Symptoms include abdominal pain, fever/chills, nausea, vomiting, diarrhea, constipation, or blood in your stool. If you were prescribed an antibiotic medicine, take it as told by your health care provider. Do not stop taking the antibiotic even if you start to feel better.    SEEK IMMEDIATE MEDICAL CARE IF YOU HAVE THE FOLLOWING SYMPTOMS: worsening abdominal pain, high fever, inability to hold down liquids or medication, black or bloody stools, inability to pass gas, lightheadedness/dizziness, or a change in mental status.    ~~~

## 2024-09-25 NOTE — ED PROVIDER NOTE - CARE PROVIDER_API CALL
Joaquim Israel  Internal Medicine  [address] [place] [id number]  Phone: [phone]  Fax: [phone]  Follow Up Time: 7-10 Days   Joaquim Israel  Internal Medicine  [address] [place] [address]  Phone: [phone]  Fax: [phone]  Follow Up Time: 7-10 Days    Pierce Lazar  Gastroenterology  [address] [place] [address], [facility] [address]  [place] [address]  Phone: [phone]  Fax: [phone]  Follow Up Time: 7-10 Days    Mo Pruett  Gastroenterology  [address] [place] [address], [facility] [address]  [place] [address]  Phone: [phone]  Fax: [phone]  Follow Up Time: 7-10 Days

## 2024-09-30 ENCOUNTER — APPOINTMENT (OUTPATIENT)
Dept: OBGYN | Facility: CLINIC | Age: 74
End: 2024-09-30

## 2024-09-30 VITALS
SYSTOLIC BLOOD PRESSURE: 151 MMHG | BODY MASS INDEX: 26.66 KG/M2 | WEIGHT: 160 LBS | DIASTOLIC BLOOD PRESSURE: 72 MMHG | HEIGHT: 65 IN | HEART RATE: 70 BPM

## 2024-09-30 DIAGNOSIS — N81.11 CYSTOCELE, MIDLINE: ICD-10-CM

## 2024-09-30 DIAGNOSIS — R35.1 NOCTURIA: ICD-10-CM

## 2024-09-30 DIAGNOSIS — N32.81 OVERACTIVE BLADDER: ICD-10-CM

## 2024-09-30 DIAGNOSIS — N95.2 POSTMENOPAUSAL ATROPHIC VAGINITIS: ICD-10-CM

## 2024-09-30 PROCEDURE — 64566 NEUROELTRD STIM POST TIBIAL: CPT

## 2024-09-30 PROCEDURE — 99213 OFFICE O/P EST LOW 20 MIN: CPT | Mod: 25

## 2024-10-02 ENCOUNTER — APPOINTMENT (OUTPATIENT)
Dept: FAMILY MEDICINE | Facility: CLINIC | Age: 74
End: 2024-10-02
Payer: MEDICARE

## 2024-10-02 ENCOUNTER — APPOINTMENT (OUTPATIENT)
Dept: GASTROENTEROLOGY | Facility: CLINIC | Age: 74
End: 2024-10-02
Payer: MEDICARE

## 2024-10-02 VITALS
WEIGHT: 159 LBS | HEART RATE: 99 BPM | HEIGHT: 65 IN | TEMPERATURE: 98 F | SYSTOLIC BLOOD PRESSURE: 149 MMHG | BODY MASS INDEX: 26.49 KG/M2 | RESPIRATION RATE: 16 BRPM | DIASTOLIC BLOOD PRESSURE: 79 MMHG | OXYGEN SATURATION: 96 %

## 2024-10-02 VITALS — DIASTOLIC BLOOD PRESSURE: 76 MMHG | SYSTOLIC BLOOD PRESSURE: 136 MMHG

## 2024-10-02 VITALS — BODY MASS INDEX: 26.49 KG/M2 | WEIGHT: 159 LBS | HEIGHT: 65 IN

## 2024-10-02 DIAGNOSIS — Z23 ENCOUNTER FOR IMMUNIZATION: ICD-10-CM

## 2024-10-02 PROCEDURE — G0008: CPT

## 2024-10-02 PROCEDURE — 99203 OFFICE O/P NEW LOW 30 MIN: CPT

## 2024-10-02 PROCEDURE — 90662 IIV NO PRSV INCREASED AG IM: CPT

## 2024-10-02 NOTE — ASSESSMENT
[FreeTextEntry1] : Acute diverticulitis s/p treatment with cipro and flagyl Symptoms have improved Last colonoscopy Jan 2024 which was normal per patient Have records of colonoscopy with 1 TA in 2018 Plan: Asked patient to fax us records of most recent colonoscopy Will review report, if adequate prep can forego post diverticulitis colonoscopy given that she had procedure within the past year  Colon cancer screening high risk given prior TA Prior colonoscopy: 2024 Alarm symptoms: none  Plan: Will review report of prior colonoscopy If adequate prep will forego further screening given age and patient's wishes (prefers not to have any more colonoscopies)

## 2024-10-02 NOTE — PHYSICAL EXAM
[Alert] : alert [Healthy Appearing] : healthy appearing [No Acute Distress] : no acute distress [None] : no edema [Normal] : normal bowel sounds, non-tender, no masses, soft, no no hepato-splenomegaly [Bowel Sounds] : normal bowel sounds [Abdomen Tenderness] : non-tender [No Masses] : no abdominal mass palpated [Abdomen Soft] : soft [Abnormal Walk] : normal gait [Normal Color / Pigmentation] : normal skin color and pigmentation [Motor Exam] : the motor exam was normal [Oriented To Time, Place, And Person] : oriented to person, place, and time [Normal Affect] : the affect was normal [Normal Mood] : the mood was normal

## 2024-10-14 ENCOUNTER — APPOINTMENT (OUTPATIENT)
Dept: FAMILY MEDICINE | Facility: CLINIC | Age: 74
End: 2024-10-14
Payer: MEDICARE

## 2024-10-14 ENCOUNTER — NON-APPOINTMENT (OUTPATIENT)
Age: 74
End: 2024-10-14

## 2024-10-14 DIAGNOSIS — Z23 ENCOUNTER FOR IMMUNIZATION: ICD-10-CM

## 2024-10-14 PROCEDURE — 90677 PCV20 VACCINE IM: CPT

## 2024-10-14 PROCEDURE — G0009: CPT

## 2024-10-23 ENCOUNTER — APPOINTMENT (OUTPATIENT)
Dept: OBGYN | Facility: CLINIC | Age: 74
End: 2024-10-23
Payer: MEDICARE

## 2024-10-23 DIAGNOSIS — N95.2 POSTMENOPAUSAL ATROPHIC VAGINITIS: ICD-10-CM

## 2024-10-23 DIAGNOSIS — N32.81 OVERACTIVE BLADDER: ICD-10-CM

## 2024-10-23 DIAGNOSIS — R35.1 NOCTURIA: ICD-10-CM

## 2024-10-23 DIAGNOSIS — N81.11 CYSTOCELE, MIDLINE: ICD-10-CM

## 2024-10-23 PROCEDURE — 64566 NEUROELTRD STIM POST TIBIAL: CPT

## 2024-10-23 PROCEDURE — 99213 OFFICE O/P EST LOW 20 MIN: CPT | Mod: 25

## 2024-11-18 ENCOUNTER — APPOINTMENT (OUTPATIENT)
Dept: OBGYN | Facility: CLINIC | Age: 74
End: 2024-11-18
Payer: MEDICARE

## 2024-11-18 VITALS
HEART RATE: 66 BPM | DIASTOLIC BLOOD PRESSURE: 71 MMHG | HEIGHT: 65 IN | SYSTOLIC BLOOD PRESSURE: 177 MMHG | WEIGHT: 160 LBS | BODY MASS INDEX: 26.66 KG/M2

## 2024-11-18 DIAGNOSIS — N95.2 POSTMENOPAUSAL ATROPHIC VAGINITIS: ICD-10-CM

## 2024-11-18 DIAGNOSIS — R35.1 NOCTURIA: ICD-10-CM

## 2024-11-18 DIAGNOSIS — N81.11 CYSTOCELE, MIDLINE: ICD-10-CM

## 2024-11-18 DIAGNOSIS — N32.81 OVERACTIVE BLADDER: ICD-10-CM

## 2024-11-18 PROCEDURE — 99214 OFFICE O/P EST MOD 30 MIN: CPT | Mod: 25

## 2024-11-18 PROCEDURE — 64566 NEUROELTRD STIM POST TIBIAL: CPT

## 2024-12-16 ENCOUNTER — NON-APPOINTMENT (OUTPATIENT)
Age: 74
End: 2024-12-16

## 2024-12-16 ENCOUNTER — APPOINTMENT (OUTPATIENT)
Dept: OBGYN | Facility: CLINIC | Age: 74
End: 2024-12-16

## 2024-12-16 VITALS
SYSTOLIC BLOOD PRESSURE: 144 MMHG | WEIGHT: 162 LBS | BODY MASS INDEX: 26.99 KG/M2 | DIASTOLIC BLOOD PRESSURE: 80 MMHG | HEIGHT: 65 IN | HEART RATE: 84 BPM

## 2024-12-16 DIAGNOSIS — R35.1 NOCTURIA: ICD-10-CM

## 2024-12-16 DIAGNOSIS — N95.2 POSTMENOPAUSAL ATROPHIC VAGINITIS: ICD-10-CM

## 2024-12-16 DIAGNOSIS — N32.81 OVERACTIVE BLADDER: ICD-10-CM

## 2024-12-16 DIAGNOSIS — N81.11 CYSTOCELE, MIDLINE: ICD-10-CM

## 2024-12-16 PROCEDURE — 64566 NEUROELTRD STIM POST TIBIAL: CPT

## 2024-12-16 PROCEDURE — 99214 OFFICE O/P EST MOD 30 MIN: CPT | Mod: 25

## 2024-12-19 ENCOUNTER — APPOINTMENT (OUTPATIENT)
Dept: ORTHOPEDIC SURGERY | Facility: CLINIC | Age: 74
End: 2024-12-19
Payer: MEDICARE

## 2024-12-19 VITALS — WEIGHT: 158 LBS | HEIGHT: 66 IN | BODY MASS INDEX: 25.39 KG/M2

## 2024-12-19 DIAGNOSIS — M54.16 RADICULOPATHY, LUMBAR REGION: ICD-10-CM

## 2024-12-19 PROCEDURE — 99214 OFFICE O/P EST MOD 30 MIN: CPT

## 2024-12-19 PROCEDURE — 72100 X-RAY EXAM L-S SPINE 2/3 VWS: CPT

## 2024-12-19 RX ORDER — METHYLPREDNISOLONE 4 MG/1
4 TABLET ORAL
Qty: 2 | Refills: 2 | Status: ACTIVE | COMMUNITY
Start: 2024-12-19 | End: 1900-01-01

## 2025-01-27 ENCOUNTER — NON-APPOINTMENT (OUTPATIENT)
Age: 75
End: 2025-01-27

## 2025-01-27 ENCOUNTER — APPOINTMENT (OUTPATIENT)
Dept: OBGYN | Facility: CLINIC | Age: 75
End: 2025-01-27
Payer: MEDICARE

## 2025-01-27 VITALS
DIASTOLIC BLOOD PRESSURE: 87 MMHG | WEIGHT: 158 LBS | BODY MASS INDEX: 25.39 KG/M2 | HEART RATE: 83 BPM | HEIGHT: 66 IN | SYSTOLIC BLOOD PRESSURE: 150 MMHG

## 2025-01-27 DIAGNOSIS — N95.2 POSTMENOPAUSAL ATROPHIC VAGINITIS: ICD-10-CM

## 2025-01-27 DIAGNOSIS — R35.0 FREQUENCY OF MICTURITION: ICD-10-CM

## 2025-01-27 DIAGNOSIS — N81.11 CYSTOCELE, MIDLINE: ICD-10-CM

## 2025-01-27 DIAGNOSIS — N32.81 OVERACTIVE BLADDER: ICD-10-CM

## 2025-01-27 PROCEDURE — 99214 OFFICE O/P EST MOD 30 MIN: CPT | Mod: 25

## 2025-01-27 PROCEDURE — 64566 NEUROELTRD STIM POST TIBIAL: CPT

## 2025-01-31 ENCOUNTER — OUTPATIENT (OUTPATIENT)
Dept: OUTPATIENT SERVICES | Facility: HOSPITAL | Age: 75
LOS: 1 days | End: 2025-01-31
Payer: MEDICARE

## 2025-01-31 ENCOUNTER — APPOINTMENT (OUTPATIENT)
Dept: ORTHOPEDIC SURGERY | Facility: CLINIC | Age: 75
End: 2025-01-31
Payer: MEDICARE

## 2025-01-31 ENCOUNTER — APPOINTMENT (OUTPATIENT)
Dept: MRI IMAGING | Facility: HOSPITAL | Age: 75
End: 2025-01-31
Payer: MEDICARE

## 2025-01-31 DIAGNOSIS — M54.16 RADICULOPATHY, LUMBAR REGION: ICD-10-CM

## 2025-01-31 DIAGNOSIS — Z98.890 OTHER SPECIFIED POSTPROCEDURAL STATES: Chronic | ICD-10-CM

## 2025-01-31 PROBLEM — M48.07 LUMBOSACRAL STENOSIS: Status: ACTIVE | Noted: 2025-01-31

## 2025-01-31 PROCEDURE — 72148 MRI LUMBAR SPINE W/O DYE: CPT | Mod: 26

## 2025-01-31 PROCEDURE — 99214 OFFICE O/P EST MOD 30 MIN: CPT | Mod: 93

## 2025-01-31 PROCEDURE — 72148 MRI LUMBAR SPINE W/O DYE: CPT

## 2025-01-31 RX ORDER — METHYLPREDNISOLONE 4 MG/1
4 TABLET ORAL
Qty: 1 | Refills: 1 | Status: ACTIVE | COMMUNITY
Start: 2025-01-31 | End: 1900-01-01

## 2025-02-06 ENCOUNTER — APPOINTMENT (OUTPATIENT)
Dept: ORTHOPEDIC SURGERY | Facility: CLINIC | Age: 75
End: 2025-02-06
Payer: MEDICARE

## 2025-02-06 ENCOUNTER — NON-APPOINTMENT (OUTPATIENT)
Age: 75
End: 2025-02-06

## 2025-02-06 VITALS — HEIGHT: 65 IN | WEIGHT: 160 LBS | BODY MASS INDEX: 26.66 KG/M2

## 2025-02-06 DIAGNOSIS — M48.07 SPINAL STENOSIS, LUMBOSACRAL REGION: ICD-10-CM

## 2025-02-06 DIAGNOSIS — M54.16 RADICULOPATHY, LUMBAR REGION: ICD-10-CM

## 2025-02-06 PROCEDURE — 99214 OFFICE O/P EST MOD 30 MIN: CPT

## 2025-02-19 ENCOUNTER — APPOINTMENT (OUTPATIENT)
Dept: OBGYN | Facility: CLINIC | Age: 75
End: 2025-02-19

## 2025-02-19 ENCOUNTER — NON-APPOINTMENT (OUTPATIENT)
Age: 75
End: 2025-02-19

## 2025-02-19 DIAGNOSIS — R35.1 NOCTURIA: ICD-10-CM

## 2025-02-19 DIAGNOSIS — N95.2 POSTMENOPAUSAL ATROPHIC VAGINITIS: ICD-10-CM

## 2025-02-19 DIAGNOSIS — N81.11 CYSTOCELE, MIDLINE: ICD-10-CM

## 2025-02-19 DIAGNOSIS — N32.81 OVERACTIVE BLADDER: ICD-10-CM

## 2025-02-19 PROCEDURE — 64566 NEUROELTRD STIM POST TIBIAL: CPT

## 2025-02-19 PROCEDURE — 99213 OFFICE O/P EST LOW 20 MIN: CPT | Mod: 25

## 2025-02-27 ENCOUNTER — APPOINTMENT (OUTPATIENT)
Dept: ORTHOPEDIC SURGERY | Facility: CLINIC | Age: 75
End: 2025-02-27
Payer: MEDICARE

## 2025-02-27 VITALS — WEIGHT: 160 LBS | HEIGHT: 65 IN | BODY MASS INDEX: 26.66 KG/M2

## 2025-02-27 DIAGNOSIS — M54.16 RADICULOPATHY, LUMBAR REGION: ICD-10-CM

## 2025-02-27 PROCEDURE — 99214 OFFICE O/P EST MOD 30 MIN: CPT

## 2025-03-17 ENCOUNTER — APPOINTMENT (OUTPATIENT)
Dept: ORTHOPEDIC SURGERY | Facility: CLINIC | Age: 75
End: 2025-03-17
Payer: MEDICARE

## 2025-03-17 ENCOUNTER — APPOINTMENT (OUTPATIENT)
Dept: OBGYN | Facility: CLINIC | Age: 75
End: 2025-03-17

## 2025-03-17 VITALS — HEIGHT: 65 IN | BODY MASS INDEX: 26.66 KG/M2 | WEIGHT: 160 LBS

## 2025-03-17 VITALS
BODY MASS INDEX: 26.66 KG/M2 | HEART RATE: 66 BPM | SYSTOLIC BLOOD PRESSURE: 188 MMHG | HEIGHT: 65 IN | WEIGHT: 160 LBS | DIASTOLIC BLOOD PRESSURE: 80 MMHG

## 2025-03-17 DIAGNOSIS — N81.11 CYSTOCELE, MIDLINE: ICD-10-CM

## 2025-03-17 DIAGNOSIS — N95.2 POSTMENOPAUSAL ATROPHIC VAGINITIS: ICD-10-CM

## 2025-03-17 DIAGNOSIS — M54.16 RADICULOPATHY, LUMBAR REGION: ICD-10-CM

## 2025-03-17 DIAGNOSIS — M48.07 SPINAL STENOSIS, LUMBOSACRAL REGION: ICD-10-CM

## 2025-03-17 DIAGNOSIS — N32.81 OVERACTIVE BLADDER: ICD-10-CM

## 2025-03-17 DIAGNOSIS — Z12.39 ENCOUNTER FOR OTHER SCREENING FOR MALIGNANT NEOPLASM OF BREAST: ICD-10-CM

## 2025-03-17 DIAGNOSIS — R35.1 NOCTURIA: ICD-10-CM

## 2025-03-17 PROCEDURE — 64566 NEUROELTRD STIM POST TIBIAL: CPT

## 2025-03-17 PROCEDURE — 99213 OFFICE O/P EST LOW 20 MIN: CPT | Mod: 25

## 2025-03-19 ENCOUNTER — APPOINTMENT (OUTPATIENT)
Dept: OBGYN | Facility: CLINIC | Age: 75
End: 2025-03-19

## 2025-03-19 ENCOUNTER — NON-APPOINTMENT (OUTPATIENT)
Age: 75
End: 2025-03-19

## 2025-04-14 ENCOUNTER — OUTPATIENT (OUTPATIENT)
Dept: OUTPATIENT SERVICES | Facility: HOSPITAL | Age: 75
LOS: 1 days | End: 2025-04-14

## 2025-04-14 VITALS
HEART RATE: 81 BPM | WEIGHT: 158.07 LBS | RESPIRATION RATE: 16 BRPM | SYSTOLIC BLOOD PRESSURE: 150 MMHG | OXYGEN SATURATION: 97 % | HEIGHT: 63 IN | TEMPERATURE: 98 F | DIASTOLIC BLOOD PRESSURE: 87 MMHG

## 2025-04-14 DIAGNOSIS — M48.07 SPINAL STENOSIS, LUMBOSACRAL REGION: ICD-10-CM

## 2025-04-14 DIAGNOSIS — Z98.890 OTHER SPECIFIED POSTPROCEDURAL STATES: Chronic | ICD-10-CM

## 2025-04-14 DIAGNOSIS — Z96.659 PRESENCE OF UNSPECIFIED ARTIFICIAL KNEE JOINT: Chronic | ICD-10-CM

## 2025-04-14 DIAGNOSIS — M48.061 SPINAL STENOSIS, LUMBAR REGION WITHOUT NEUROGENIC CLAUDICATION: ICD-10-CM

## 2025-04-14 LAB
A1C WITH ESTIMATED AVERAGE GLUCOSE RESULT: 5.1 % — SIGNIFICANT CHANGE UP (ref 4–5.6)
ANION GAP SERPL CALC-SCNC: 14 MMOL/L — SIGNIFICANT CHANGE UP (ref 7–14)
BASOPHILS # BLD AUTO: 0.07 K/UL — SIGNIFICANT CHANGE UP (ref 0–0.2)
BASOPHILS NFR BLD AUTO: 0.5 % — SIGNIFICANT CHANGE UP (ref 0–2)
BLD GP AB SCN SERPL QL: NEGATIVE — SIGNIFICANT CHANGE UP
BUN SERPL-MCNC: 18 MG/DL — SIGNIFICANT CHANGE UP (ref 7–23)
CALCIUM SERPL-MCNC: 10 MG/DL — SIGNIFICANT CHANGE UP (ref 8.4–10.5)
CHLORIDE SERPL-SCNC: 103 MMOL/L — SIGNIFICANT CHANGE UP (ref 98–107)
CO2 SERPL-SCNC: 26 MMOL/L — SIGNIFICANT CHANGE UP (ref 22–31)
CREAT SERPL-MCNC: 0.78 MG/DL — SIGNIFICANT CHANGE UP (ref 0.5–1.3)
EGFR: 80 ML/MIN/1.73M2 — SIGNIFICANT CHANGE UP
EGFR: 80 ML/MIN/1.73M2 — SIGNIFICANT CHANGE UP
EOSINOPHIL # BLD AUTO: 0.13 K/UL — SIGNIFICANT CHANGE UP (ref 0–0.5)
EOSINOPHIL NFR BLD AUTO: 1 % — SIGNIFICANT CHANGE UP (ref 0–6)
ESTIMATED AVERAGE GLUCOSE: 100 — SIGNIFICANT CHANGE UP
GLUCOSE SERPL-MCNC: 69 MG/DL — LOW (ref 70–99)
HCT VFR BLD CALC: 42.8 % — SIGNIFICANT CHANGE UP (ref 34.5–45)
HGB BLD-MCNC: 13.9 G/DL — SIGNIFICANT CHANGE UP (ref 11.5–15.5)
IMM GRANULOCYTES NFR BLD AUTO: 0.3 % — SIGNIFICANT CHANGE UP (ref 0–0.9)
LYMPHOCYTES # BLD AUTO: 19.8 % — SIGNIFICANT CHANGE UP (ref 13–44)
LYMPHOCYTES # BLD AUTO: 2.57 K/UL — SIGNIFICANT CHANGE UP (ref 1–3.3)
MCHC RBC-ENTMCNC: 31.6 PG — SIGNIFICANT CHANGE UP (ref 27–34)
MCHC RBC-ENTMCNC: 32.5 G/DL — SIGNIFICANT CHANGE UP (ref 32–36)
MCV RBC AUTO: 97.3 FL — SIGNIFICANT CHANGE UP (ref 80–100)
MONOCYTES # BLD AUTO: 0.72 K/UL — SIGNIFICANT CHANGE UP (ref 0–0.9)
MONOCYTES NFR BLD AUTO: 5.5 % — SIGNIFICANT CHANGE UP (ref 2–14)
NEUTROPHILS # BLD AUTO: 9.46 K/UL — HIGH (ref 1.8–7.4)
NEUTROPHILS NFR BLD AUTO: 72.9 % — SIGNIFICANT CHANGE UP (ref 43–77)
PLATELET # BLD AUTO: 407 K/UL — HIGH (ref 150–400)
POTASSIUM SERPL-MCNC: 3.9 MMOL/L — SIGNIFICANT CHANGE UP (ref 3.5–5.3)
POTASSIUM SERPL-SCNC: 3.9 MMOL/L — SIGNIFICANT CHANGE UP (ref 3.5–5.3)
RBC # BLD: 4.4 M/UL — SIGNIFICANT CHANGE UP (ref 3.8–5.2)
RBC # FLD: 12.6 % — SIGNIFICANT CHANGE UP (ref 10.3–14.5)
RH IG SCN BLD-IMP: POSITIVE — SIGNIFICANT CHANGE UP
RH IG SCN BLD-IMP: POSITIVE — SIGNIFICANT CHANGE UP
SODIUM SERPL-SCNC: 143 MMOL/L — SIGNIFICANT CHANGE UP (ref 135–145)
WBC # BLD: 12.99 K/UL — HIGH (ref 3.8–10.5)
WBC # FLD AUTO: 12.99 K/UL — HIGH (ref 3.8–10.5)

## 2025-04-14 NOTE — H&P PST ADULT - PROBLEM SELECTOR PLAN 1
Pt scheduled for surgery and preop instructions including instructions for taking Famotidine and for Chlorhexidine use in showering on the day of surgery, given verbally and with use of  written materials, and patient confirming understanding of such instructions using  teach back method.  ERAS and shower instructions reviewed with pt   Call to PCP - no EKG on record - pt to see for clearance - have requested copy for chart

## 2025-04-14 NOTE — H&P PST ADULT - NSICDXPASTMEDICALHX_GEN_ALL_CORE_FT
PAST MEDICAL HISTORY:  Anxiety     Benign breast lumps     Carpal tunnel syndrome, left upper limb     Depression     Fibroids     H/O fracture of ankle     History of fracture due to fall     History of tear of ACL (anterior cruciate ligament)     Insomnia     Osteoarthritis     Overactive bladder     Torn meniscus      PAST MEDICAL HISTORY:  Anxiety     Benign breast lumps     Carpal tunnel syndrome, left upper limb     Depression     Fibroids     H/O fracture of ankle     History of fracture due to fall     History of tear of ACL (anterior cruciate ligament)     Insomnia     Lumbar stenosis     Osteoarthritis     Overactive bladder     Torn meniscus

## 2025-04-14 NOTE — H&P PST ADULT - FUNCTIONAL STATUS
METs DASI/4-10 METS METs DASI - 5.81 - pt c/o of severe leg pain with right leg ROM altered -/4-10 METS

## 2025-04-14 NOTE — H&P PST ADULT - MUSCULOSKELETAL COMMENTS
Pt hx right knee replacement with restricted bending after several surgeries to correct Pt hx right knee replacement with restricted ROM - inability to bend fully after several surgeries to correct - Pt c/o left leg pain while walking / laying down

## 2025-04-14 NOTE — H&P PST ADULT - HISTORY OF PRESENT ILLNESS
Pt is a 74 yr old female scheduled for L4-5 Lumbar Laminectomy with Dr Reilly 4/29/25 - pt c/o chronic  pain left leg with walking and laying down for "years". Pt has had injections for pain without relief - - hx right knee replacement with several revisions and altered ROM / unable to bend fully - pt hx depression,, obesity

## 2025-04-15 ENCOUNTER — NON-APPOINTMENT (OUTPATIENT)
Age: 75
End: 2025-04-15

## 2025-04-15 ENCOUNTER — APPOINTMENT (OUTPATIENT)
Dept: CARDIOLOGY | Facility: CLINIC | Age: 75
End: 2025-04-15
Payer: MEDICARE

## 2025-04-15 VITALS
BODY MASS INDEX: 26.66 KG/M2 | HEART RATE: 81 BPM | HEIGHT: 65 IN | WEIGHT: 160 LBS | DIASTOLIC BLOOD PRESSURE: 86 MMHG | OXYGEN SATURATION: 98 % | SYSTOLIC BLOOD PRESSURE: 148 MMHG

## 2025-04-15 DIAGNOSIS — Z01.810 ENCOUNTER FOR PREPROCEDURAL CARDIOVASCULAR EXAMINATION: ICD-10-CM

## 2025-04-15 LAB
MRSA PCR RESULT.: SIGNIFICANT CHANGE UP
S AUREUS DNA NOSE QL NAA+PROBE: DETECTED

## 2025-04-15 PROCEDURE — 93000 ELECTROCARDIOGRAM COMPLETE: CPT | Mod: NC

## 2025-04-15 PROCEDURE — 99204 OFFICE O/P NEW MOD 45 MIN: CPT

## 2025-04-16 RX ORDER — MUPIROCIN 20 MG/G
2 OINTMENT TOPICAL
Qty: 1 | Refills: 0 | Status: ACTIVE | COMMUNITY
Start: 2025-04-16 | End: 1900-01-01

## 2025-04-21 ENCOUNTER — APPOINTMENT (OUTPATIENT)
Dept: FAMILY MEDICINE | Facility: CLINIC | Age: 75
End: 2025-04-21
Payer: MEDICARE

## 2025-04-21 VITALS
SYSTOLIC BLOOD PRESSURE: 138 MMHG | HEIGHT: 65 IN | OXYGEN SATURATION: 98 % | RESPIRATION RATE: 18 BRPM | HEART RATE: 88 BPM | TEMPERATURE: 97.4 F | BODY MASS INDEX: 27.32 KG/M2 | WEIGHT: 164 LBS | DIASTOLIC BLOOD PRESSURE: 82 MMHG

## 2025-04-21 DIAGNOSIS — R79.9 ABNORMAL FINDING OF BLOOD CHEMISTRY, UNSPECIFIED: ICD-10-CM

## 2025-04-21 DIAGNOSIS — Z87.898 PERSONAL HISTORY OF OTHER SPECIFIED CONDITIONS: ICD-10-CM

## 2025-04-21 DIAGNOSIS — T84.82XA FIBROSIS DUE TO INTERNAL ORTHOPEDIC PROSTHETIC DEVICES, IMPLANTS AND GRAFTS, INITIAL ENCOUNTER: ICD-10-CM

## 2025-04-21 DIAGNOSIS — D72.829 ELEVATED WHITE BLOOD CELL COUNT, UNSPECIFIED: ICD-10-CM

## 2025-04-21 DIAGNOSIS — R79.89 OTHER SPECIFIED ABNORMAL FINDINGS OF BLOOD CHEMISTRY: ICD-10-CM

## 2025-04-21 DIAGNOSIS — Z01.818 ENCOUNTER FOR OTHER PREPROCEDURAL EXAMINATION: ICD-10-CM

## 2025-04-21 DIAGNOSIS — M54.16 RADICULOPATHY, LUMBAR REGION: ICD-10-CM

## 2025-04-21 PROBLEM — M48.061 SPINAL STENOSIS, LUMBAR REGION WITHOUT NEUROGENIC CLAUDICATION: Chronic | Status: ACTIVE | Noted: 2025-04-14

## 2025-04-21 PROCEDURE — 36415 COLL VENOUS BLD VENIPUNCTURE: CPT

## 2025-04-21 PROCEDURE — G2211 COMPLEX E/M VISIT ADD ON: CPT

## 2025-04-21 PROCEDURE — 99215 OFFICE O/P EST HI 40 MIN: CPT

## 2025-04-22 PROBLEM — R79.89 ABNORMAL CBC: Status: ACTIVE | Noted: 2025-04-21

## 2025-04-22 PROBLEM — R79.9 ABNORMAL BLOOD CHEMISTRY: Status: ACTIVE | Noted: 2025-04-21

## 2025-04-22 LAB
BASOPHILS # BLD AUTO: 0.09 K/UL
BASOPHILS NFR BLD AUTO: 0.9 %
EOSINOPHIL # BLD AUTO: 0.16 K/UL
EOSINOPHIL NFR BLD AUTO: 1.7 %
GLUCOSE SERPL-MCNC: 92 MG/DL
HCT VFR BLD CALC: 41.5 %
HGB BLD-MCNC: 13.4 G/DL
IMM GRANULOCYTES NFR BLD AUTO: 0.4 %
LYMPHOCYTES # BLD AUTO: 2.68 K/UL
LYMPHOCYTES NFR BLD AUTO: 28.1 %
MAN DIFF?: NORMAL
MCHC RBC-ENTMCNC: 31.6 PG
MCHC RBC-ENTMCNC: 32.3 G/DL
MCV RBC AUTO: 97.9 FL
MONOCYTES # BLD AUTO: 0.62 K/UL
MONOCYTES NFR BLD AUTO: 6.5 %
NEUTROPHILS # BLD AUTO: 5.95 K/UL
NEUTROPHILS NFR BLD AUTO: 62.4 %
PLATELET # BLD AUTO: 404 K/UL
RBC # BLD: 4.24 M/UL
RBC # FLD: 12.5 %
WBC # FLD AUTO: 9.54 K/UL

## 2025-04-23 ENCOUNTER — APPOINTMENT (OUTPATIENT)
Dept: OBGYN | Facility: CLINIC | Age: 75
End: 2025-04-23

## 2025-04-23 VITALS — HEIGHT: 65 IN | HEART RATE: 101 BPM | SYSTOLIC BLOOD PRESSURE: 150 MMHG | DIASTOLIC BLOOD PRESSURE: 69 MMHG

## 2025-04-23 DIAGNOSIS — N95.2 POSTMENOPAUSAL ATROPHIC VAGINITIS: ICD-10-CM

## 2025-04-23 DIAGNOSIS — N81.11 CYSTOCELE, MIDLINE: ICD-10-CM

## 2025-04-23 DIAGNOSIS — N32.81 OVERACTIVE BLADDER: ICD-10-CM

## 2025-04-23 DIAGNOSIS — R35.1 NOCTURIA: ICD-10-CM

## 2025-04-23 PROCEDURE — 99213 OFFICE O/P EST LOW 20 MIN: CPT | Mod: 25

## 2025-04-23 PROCEDURE — 64566 NEUROELTRD STIM POST TIBIAL: CPT

## 2025-04-24 NOTE — BEGINNING OF VISIT
[Patient] : patient [FreeTextEntry1] : Transfer of care from Dr. Monson\par Found hypothyroid 4 years ago, presenting with weight gain and fatigue. ON a stable dose of synthroid .088 with good adherence. THyroid ultrasound done in past due to enlarged gland.\par Diagnosis of PCOS established ? sonographically; mildly irregular menses but no amenorrhea. Uterine procedure performed in past. Also briefly on OCP. Pt. will want to conceive in the future; no pregnancies, no contraception.\par Chronic obesity, now near peak weight. 20 pound weight loss in past on diet. Very active. Never on meds for weight loss. Lap band suggested by another physician; pt. has contemplated bariatric surgery. Denies increased hunger.\par Family history strongly positive for diabetes Negative

## 2025-04-29 ENCOUNTER — RESULT REVIEW (OUTPATIENT)
Age: 75
End: 2025-04-29

## 2025-04-29 ENCOUNTER — APPOINTMENT (OUTPATIENT)
Dept: ORTHOPEDIC SURGERY | Facility: HOSPITAL | Age: 75
End: 2025-04-29

## 2025-04-29 ENCOUNTER — INPATIENT (INPATIENT)
Facility: HOSPITAL | Age: 75
LOS: 3 days | Discharge: HOME | End: 2025-05-03
Attending: ORTHOPAEDIC SURGERY | Admitting: ORTHOPAEDIC SURGERY
Payer: MEDICARE

## 2025-04-29 ENCOUNTER — TRANSCRIPTION ENCOUNTER (OUTPATIENT)
Age: 75
End: 2025-04-29

## 2025-04-29 VITALS
WEIGHT: 158.07 LBS | HEIGHT: 63 IN | RESPIRATION RATE: 14 BRPM | TEMPERATURE: 98 F | OXYGEN SATURATION: 100 % | HEART RATE: 100 BPM | SYSTOLIC BLOOD PRESSURE: 167 MMHG | DIASTOLIC BLOOD PRESSURE: 81 MMHG

## 2025-04-29 DIAGNOSIS — Z98.890 OTHER SPECIFIED POSTPROCEDURAL STATES: Chronic | ICD-10-CM

## 2025-04-29 DIAGNOSIS — Z96.659 PRESENCE OF UNSPECIFIED ARTIFICIAL KNEE JOINT: Chronic | ICD-10-CM

## 2025-04-29 DIAGNOSIS — M48.07 SPINAL STENOSIS, LUMBOSACRAL REGION: ICD-10-CM

## 2025-04-29 LAB
ANION GAP SERPL CALC-SCNC: 12 MMOL/L — SIGNIFICANT CHANGE UP (ref 7–14)
BASOPHILS # BLD AUTO: 0.08 K/UL — SIGNIFICANT CHANGE UP (ref 0–0.2)
BASOPHILS NFR BLD AUTO: 0.8 % — SIGNIFICANT CHANGE UP (ref 0–2)
BUN SERPL-MCNC: 17 MG/DL — SIGNIFICANT CHANGE UP (ref 7–23)
CALCIUM SERPL-MCNC: 8.8 MG/DL — SIGNIFICANT CHANGE UP (ref 8.4–10.5)
CHLORIDE SERPL-SCNC: 107 MMOL/L — SIGNIFICANT CHANGE UP (ref 98–107)
CO2 SERPL-SCNC: 22 MMOL/L — SIGNIFICANT CHANGE UP (ref 22–31)
CREAT SERPL-MCNC: 0.67 MG/DL — SIGNIFICANT CHANGE UP (ref 0.5–1.3)
EGFR: 92 ML/MIN/1.73M2 — SIGNIFICANT CHANGE UP
EGFR: 92 ML/MIN/1.73M2 — SIGNIFICANT CHANGE UP
EOSINOPHIL # BLD AUTO: 0.09 K/UL — SIGNIFICANT CHANGE UP (ref 0–0.5)
EOSINOPHIL NFR BLD AUTO: 0.9 % — SIGNIFICANT CHANGE UP (ref 0–6)
GLUCOSE BLDC GLUCOMTR-MCNC: 102 MG/DL — HIGH (ref 70–99)
GLUCOSE SERPL-MCNC: 115 MG/DL — HIGH (ref 70–99)
HCT VFR BLD CALC: 37 % — SIGNIFICANT CHANGE UP (ref 34.5–45)
HGB BLD-MCNC: 12.5 G/DL — SIGNIFICANT CHANGE UP (ref 11.5–15.5)
IANC: 7.65 K/UL — HIGH (ref 1.8–7.4)
IMM GRANULOCYTES NFR BLD AUTO: 1.2 % — HIGH (ref 0–0.9)
LYMPHOCYTES # BLD AUTO: 1.64 K/UL — SIGNIFICANT CHANGE UP (ref 1–3.3)
LYMPHOCYTES # BLD AUTO: 16.7 % — SIGNIFICANT CHANGE UP (ref 13–44)
MCHC RBC-ENTMCNC: 32 PG — SIGNIFICANT CHANGE UP (ref 27–34)
MCHC RBC-ENTMCNC: 33.8 G/DL — SIGNIFICANT CHANGE UP (ref 32–36)
MCV RBC AUTO: 94.6 FL — SIGNIFICANT CHANGE UP (ref 80–100)
MONOCYTES # BLD AUTO: 0.25 K/UL — SIGNIFICANT CHANGE UP (ref 0–0.9)
MONOCYTES NFR BLD AUTO: 2.5 % — SIGNIFICANT CHANGE UP (ref 2–14)
NEUTROPHILS # BLD AUTO: 7.65 K/UL — HIGH (ref 1.8–7.4)
NEUTROPHILS NFR BLD AUTO: 77.9 % — HIGH (ref 43–77)
NRBC # BLD AUTO: 0 K/UL — SIGNIFICANT CHANGE UP (ref 0–0)
NRBC # FLD: 0 K/UL — SIGNIFICANT CHANGE UP (ref 0–0)
NRBC BLD AUTO-RTO: 0 /100 WBCS — SIGNIFICANT CHANGE UP (ref 0–0)
PLATELET # BLD AUTO: 293 K/UL — SIGNIFICANT CHANGE UP (ref 150–400)
POTASSIUM SERPL-MCNC: 4.7 MMOL/L — SIGNIFICANT CHANGE UP (ref 3.5–5.3)
POTASSIUM SERPL-SCNC: 4.7 MMOL/L — SIGNIFICANT CHANGE UP (ref 3.5–5.3)
RBC # BLD: 3.91 M/UL — SIGNIFICANT CHANGE UP (ref 3.8–5.2)
RBC # FLD: 12 % — SIGNIFICANT CHANGE UP (ref 10.3–14.5)
SODIUM SERPL-SCNC: 141 MMOL/L — SIGNIFICANT CHANGE UP (ref 135–145)
WBC # BLD: 9.83 K/UL — SIGNIFICANT CHANGE UP (ref 3.8–10.5)
WBC # FLD AUTO: 9.83 K/UL — SIGNIFICANT CHANGE UP (ref 3.8–10.5)

## 2025-04-29 PROCEDURE — 88311 DECALCIFY TISSUE: CPT | Mod: 26

## 2025-04-29 PROCEDURE — 72100 X-RAY EXAM L-S SPINE 2/3 VWS: CPT | Mod: 26

## 2025-04-29 PROCEDURE — 63048 LAM FACETEC &FORAMOT EA ADDL: CPT | Mod: 82,59

## 2025-04-29 PROCEDURE — 14302 TIS TRNFR ADDL 30 SQ CM: CPT

## 2025-04-29 PROCEDURE — 15002 WOUND PREP TRK/ARM/LEG: CPT

## 2025-04-29 PROCEDURE — 63267 EXCISE INTRSPINL LESION LMBR: CPT

## 2025-04-29 PROCEDURE — 88304 TISSUE EXAM BY PATHOLOGIST: CPT | Mod: 26

## 2025-04-29 PROCEDURE — 63047 LAM FACETEC & FORAMOT LUMBAR: CPT | Mod: 82,59

## 2025-04-29 PROCEDURE — 63048 LAM FACETEC &FORAMOT EA ADDL: CPT | Mod: 59

## 2025-04-29 PROCEDURE — 63267 EXCISE INTRSPINL LESION LMBR: CPT | Mod: 82

## 2025-04-29 PROCEDURE — 15734 MUSCLE-SKIN GRAFT TRUNK: CPT

## 2025-04-29 PROCEDURE — 63047 LAM FACETEC & FORAMOT LUMBAR: CPT | Mod: 59

## 2025-04-29 PROCEDURE — 15003 WOUND PREP ADDL 100 CM: CPT

## 2025-04-29 PROCEDURE — 14301 TIS TRNFR ANY 30.1-60 SQ CM: CPT

## 2025-04-29 DEVICE — BONE WAX 2.5GM: Type: IMPLANTABLE DEVICE | Status: FUNCTIONAL

## 2025-04-29 DEVICE — SURGIFLO MATRIX WITH THROMBIN KIT: Type: IMPLANTABLE DEVICE | Status: FUNCTIONAL

## 2025-04-29 DEVICE — SURGIFOAM 8 X 12.25CM X 2MM: Type: IMPLANTABLE DEVICE | Status: FUNCTIONAL

## 2025-04-29 RX ORDER — FENTANYL CITRATE-0.9 % NACL/PF 100MCG/2ML
25 SYRINGE (ML) INTRAVENOUS
Refills: 0 | Status: DISCONTINUED | OUTPATIENT
Start: 2025-04-29 | End: 2025-04-29

## 2025-04-29 RX ORDER — HYDROMORPHONE/SOD CHLOR,ISO/PF 2 MG/10 ML
0.5 SYRINGE (ML) INJECTION
Refills: 0 | Status: DISCONTINUED | OUTPATIENT
Start: 2025-04-29 | End: 2025-04-29

## 2025-04-29 RX ORDER — GABAPENTIN 400 MG/1
100 CAPSULE ORAL THREE TIMES A DAY
Refills: 0 | Status: DISCONTINUED | OUTPATIENT
Start: 2025-04-29 | End: 2025-05-03

## 2025-04-29 RX ORDER — CYCLOBENZAPRINE HYDROCHLORIDE 15 MG/1
5 CAPSULE, EXTENDED RELEASE ORAL THREE TIMES A DAY
Refills: 0 | Status: DISCONTINUED | OUTPATIENT
Start: 2025-04-29 | End: 2025-05-03

## 2025-04-29 RX ORDER — SODIUM CHLORIDE 9 G/1000ML
500 INJECTION, SOLUTION INTRAVENOUS ONCE
Refills: 0 | Status: COMPLETED | OUTPATIENT
Start: 2025-04-30 | End: 2025-04-30

## 2025-04-29 RX ORDER — SODIUM CHLORIDE 9 G/1000ML
1000 INJECTION, SOLUTION INTRAVENOUS
Refills: 0 | Status: DISCONTINUED | OUTPATIENT
Start: 2025-04-29 | End: 2025-04-29

## 2025-04-29 RX ORDER — OXYCODONE HYDROCHLORIDE 30 MG/1
5 TABLET ORAL ONCE
Refills: 0 | Status: DISCONTINUED | OUTPATIENT
Start: 2025-04-29 | End: 2025-04-29

## 2025-04-29 RX ORDER — BUPROPION HYDROBROMIDE 522 MG/1
200 TABLET, EXTENDED RELEASE ORAL
Refills: 0 | Status: DISCONTINUED | OUTPATIENT
Start: 2025-04-29 | End: 2025-05-03

## 2025-04-29 RX ORDER — BUPROPION HYDROBROMIDE 522 MG/1
400 TABLET, EXTENDED RELEASE ORAL DAILY
Refills: 0 | Status: DISCONTINUED | OUTPATIENT
Start: 2025-04-29 | End: 2025-04-29

## 2025-04-29 RX ORDER — INFLUENZA A VIRUS A/IDAHO/07/2018 (H1N1) ANTIGEN (MDCK CELL DERIVED, PROPIOLACTONE INACTIVATED, INFLUENZA A VIRUS A/INDIANA/08/2018 (H3N2) ANTIGEN (MDCK CELL DERIVED, PROPIOLACTONE INACTIVATED), INFLUENZA B VIRUS B/SINGAPORE/INFTT-16-0610/2016 ANTIGEN (MDCK CELL DERIVED, PROPIOLACTONE INACTIVATED), INFLUENZA B VIRUS B/IOWA/06/2017 ANTIGEN (MDCK CELL DERIVED, PROPIOLACTONE INACTIVATED) 15; 15; 15; 15 UG/.5ML; UG/.5ML; UG/.5ML; UG/.5ML
0.5 INJECTION, SUSPENSION INTRAMUSCULAR ONCE
Refills: 0 | Status: DISCONTINUED | OUTPATIENT
Start: 2025-04-29 | End: 2025-05-03

## 2025-04-29 RX ORDER — SENNA 187 MG
2 TABLET ORAL AT BEDTIME
Refills: 0 | Status: DISCONTINUED | OUTPATIENT
Start: 2025-04-29 | End: 2025-05-03

## 2025-04-29 RX ORDER — OXYCODONE HYDROCHLORIDE 30 MG/1
5 TABLET ORAL EVERY 4 HOURS
Refills: 0 | Status: DISCONTINUED | OUTPATIENT
Start: 2025-04-29 | End: 2025-04-29

## 2025-04-29 RX ORDER — TRAMADOL HYDROCHLORIDE 50 MG/1
50 TABLET, FILM COATED ORAL ONCE
Refills: 0 | Status: DISCONTINUED | OUTPATIENT
Start: 2025-04-29 | End: 2025-04-29

## 2025-04-29 RX ORDER — TRAMADOL HYDROCHLORIDE 50 MG/1
50 TABLET, FILM COATED ORAL EVERY 6 HOURS
Refills: 0 | Status: DISCONTINUED | OUTPATIENT
Start: 2025-04-29 | End: 2025-04-29

## 2025-04-29 RX ORDER — TRAMADOL HYDROCHLORIDE 50 MG/1
50 TABLET, FILM COATED ORAL EVERY 6 HOURS
Refills: 0 | Status: DISCONTINUED | OUTPATIENT
Start: 2025-04-29 | End: 2025-05-03

## 2025-04-29 RX ORDER — CEFAZOLIN SODIUM IN 0.9 % NACL 3 G/100 ML
2000 INTRAVENOUS SOLUTION, PIGGYBACK (ML) INTRAVENOUS EVERY 8 HOURS
Refills: 0 | Status: COMPLETED | OUTPATIENT
Start: 2025-04-29 | End: 2025-04-30

## 2025-04-29 RX ORDER — ACETAMINOPHEN 500 MG/5ML
975 LIQUID (ML) ORAL EVERY 8 HOURS
Refills: 0 | Status: DISCONTINUED | OUTPATIENT
Start: 2025-04-29 | End: 2025-05-03

## 2025-04-29 RX ORDER — OXYCODONE HYDROCHLORIDE 30 MG/1
2.5 TABLET ORAL EVERY 6 HOURS
Refills: 0 | Status: DISCONTINUED | OUTPATIENT
Start: 2025-04-29 | End: 2025-04-30

## 2025-04-29 RX ORDER — ONDANSETRON HCL/PF 4 MG/2 ML
4 VIAL (ML) INJECTION EVERY 4 HOURS
Refills: 0 | Status: DISCONTINUED | OUTPATIENT
Start: 2025-04-29 | End: 2025-04-29

## 2025-04-29 RX ORDER — SODIUM CHLORIDE 9 G/1000ML
500 INJECTION, SOLUTION INTRAVENOUS ONCE
Refills: 0 | Status: COMPLETED | OUTPATIENT
Start: 2025-04-29 | End: 2025-04-29

## 2025-04-29 RX ORDER — TRAZODONE HCL 100 MG
50 TABLET ORAL AT BEDTIME
Refills: 0 | Status: DISCONTINUED | OUTPATIENT
Start: 2025-04-29 | End: 2025-05-03

## 2025-04-29 RX ORDER — POLYETHYLENE GLYCOL 3350 17 G/17G
17 POWDER, FOR SOLUTION ORAL DAILY
Refills: 0 | Status: DISCONTINUED | OUTPATIENT
Start: 2025-04-29 | End: 2025-05-03

## 2025-04-29 RX ORDER — ACETAMINOPHEN 500 MG/5ML
975 LIQUID (ML) ORAL ONCE
Refills: 0 | Status: COMPLETED | OUTPATIENT
Start: 2025-04-29 | End: 2025-04-29

## 2025-04-29 RX ORDER — PREGABALIN 50 MG/1
150 CAPSULE ORAL ONCE
Refills: 0 | Status: DISCONTINUED | OUTPATIENT
Start: 2025-04-29 | End: 2025-04-29

## 2025-04-29 RX ORDER — OXYCODONE HYDROCHLORIDE 30 MG/1
5 TABLET ORAL EVERY 4 HOURS
Refills: 0 | Status: DISCONTINUED | OUTPATIENT
Start: 2025-04-29 | End: 2025-04-30

## 2025-04-29 RX ADMIN — Medication 50 MILLIGRAM(S): at 22:46

## 2025-04-29 RX ADMIN — OXYCODONE HYDROCHLORIDE 5 MILLIGRAM(S): 30 TABLET ORAL at 21:45

## 2025-04-29 RX ADMIN — TRAMADOL HYDROCHLORIDE 50 MILLIGRAM(S): 50 TABLET, FILM COATED ORAL at 16:42

## 2025-04-29 RX ADMIN — TRAMADOL HYDROCHLORIDE 50 MILLIGRAM(S): 50 TABLET, FILM COATED ORAL at 07:40

## 2025-04-29 RX ADMIN — Medication 0.5 MILLIGRAM(S): at 11:32

## 2025-04-29 RX ADMIN — SODIUM CHLORIDE 30 MILLILITER(S): 9 INJECTION, SOLUTION INTRAVENOUS at 07:39

## 2025-04-29 RX ADMIN — OXYCODONE HYDROCHLORIDE 5 MILLIGRAM(S): 30 TABLET ORAL at 12:18

## 2025-04-29 RX ADMIN — Medication 40 MILLIGRAM(S): at 07:40

## 2025-04-29 RX ADMIN — Medication 975 MILLIGRAM(S): at 16:42

## 2025-04-29 RX ADMIN — Medication 100 MILLIGRAM(S): at 17:42

## 2025-04-29 RX ADMIN — Medication 975 MILLIGRAM(S): at 07:39

## 2025-04-29 RX ADMIN — OXYCODONE HYDROCHLORIDE 5 MILLIGRAM(S): 30 TABLET ORAL at 13:00

## 2025-04-29 RX ADMIN — CYCLOBENZAPRINE HYDROCHLORIDE 5 MILLIGRAM(S): 15 CAPSULE, EXTENDED RELEASE ORAL at 12:44

## 2025-04-29 RX ADMIN — PREGABALIN 150 MILLIGRAM(S): 50 CAPSULE ORAL at 07:40

## 2025-04-29 RX ADMIN — TRAMADOL HYDROCHLORIDE 50 MILLIGRAM(S): 50 TABLET, FILM COATED ORAL at 15:42

## 2025-04-29 RX ADMIN — GABAPENTIN 100 MILLIGRAM(S): 400 CAPSULE ORAL at 15:43

## 2025-04-29 RX ADMIN — Medication 1 APPLICATION(S): at 07:40

## 2025-04-29 RX ADMIN — SODIUM CHLORIDE 1000 MILLILITER(S): 9 INJECTION, SOLUTION INTRAVENOUS at 11:21

## 2025-04-29 RX ADMIN — Medication 975 MILLIGRAM(S): at 15:42

## 2025-04-29 RX ADMIN — Medication 0.5 MILLIGRAM(S): at 11:47

## 2025-04-29 RX ADMIN — OXYCODONE HYDROCHLORIDE 5 MILLIGRAM(S): 30 TABLET ORAL at 20:45

## 2025-04-29 RX ADMIN — Medication 0.5 MILLIGRAM(S): at 13:30

## 2025-04-29 RX ADMIN — Medication 0.5 MILLIGRAM(S): at 13:45

## 2025-04-29 RX ADMIN — GABAPENTIN 100 MILLIGRAM(S): 400 CAPSULE ORAL at 21:50

## 2025-04-29 RX ADMIN — Medication 2 TABLET(S): at 21:50

## 2025-04-30 DIAGNOSIS — D72.829 ELEVATED WHITE BLOOD CELL COUNT, UNSPECIFIED: ICD-10-CM

## 2025-04-30 DIAGNOSIS — Z29.9 ENCOUNTER FOR PROPHYLACTIC MEASURES, UNSPECIFIED: ICD-10-CM

## 2025-04-30 LAB
ANION GAP SERPL CALC-SCNC: 13 MMOL/L — SIGNIFICANT CHANGE UP (ref 7–14)
BASOPHILS # BLD AUTO: 0.02 K/UL — SIGNIFICANT CHANGE UP (ref 0–0.2)
BASOPHILS NFR BLD AUTO: 0.1 % — SIGNIFICANT CHANGE UP (ref 0–2)
BUN SERPL-MCNC: 13 MG/DL — SIGNIFICANT CHANGE UP (ref 7–23)
CALCIUM SERPL-MCNC: 9.2 MG/DL — SIGNIFICANT CHANGE UP (ref 8.4–10.5)
CHLORIDE SERPL-SCNC: 105 MMOL/L — SIGNIFICANT CHANGE UP (ref 98–107)
CO2 SERPL-SCNC: 24 MMOL/L — SIGNIFICANT CHANGE UP (ref 22–31)
CREAT SERPL-MCNC: 0.64 MG/DL — SIGNIFICANT CHANGE UP (ref 0.5–1.3)
EGFR: 93 ML/MIN/1.73M2 — SIGNIFICANT CHANGE UP
EGFR: 93 ML/MIN/1.73M2 — SIGNIFICANT CHANGE UP
EOSINOPHIL # BLD AUTO: 0 K/UL — SIGNIFICANT CHANGE UP (ref 0–0.5)
EOSINOPHIL NFR BLD AUTO: 0 % — SIGNIFICANT CHANGE UP (ref 0–6)
GLUCOSE SERPL-MCNC: 125 MG/DL — HIGH (ref 70–99)
HCT VFR BLD CALC: 36.9 % — SIGNIFICANT CHANGE UP (ref 34.5–45)
HGB BLD-MCNC: 12.5 G/DL — SIGNIFICANT CHANGE UP (ref 11.5–15.5)
IANC: 14.11 K/UL — HIGH (ref 1.8–7.4)
IMM GRANULOCYTES NFR BLD AUTO: 0.6 % — SIGNIFICANT CHANGE UP (ref 0–0.9)
LYMPHOCYTES # BLD AUTO: 12.8 % — LOW (ref 13–44)
LYMPHOCYTES # BLD AUTO: 2.28 K/UL — SIGNIFICANT CHANGE UP (ref 1–3.3)
MCHC RBC-ENTMCNC: 31.9 PG — SIGNIFICANT CHANGE UP (ref 27–34)
MCHC RBC-ENTMCNC: 33.9 G/DL — SIGNIFICANT CHANGE UP (ref 32–36)
MCV RBC AUTO: 94.1 FL — SIGNIFICANT CHANGE UP (ref 80–100)
MONOCYTES # BLD AUTO: 1.26 K/UL — HIGH (ref 0–0.9)
MONOCYTES NFR BLD AUTO: 7.1 % — SIGNIFICANT CHANGE UP (ref 2–14)
NEUTROPHILS # BLD AUTO: 14.11 K/UL — HIGH (ref 1.8–7.4)
NEUTROPHILS NFR BLD AUTO: 79.4 % — HIGH (ref 43–77)
NRBC # BLD AUTO: 0 K/UL — SIGNIFICANT CHANGE UP (ref 0–0)
NRBC # FLD: 0 K/UL — SIGNIFICANT CHANGE UP (ref 0–0)
NRBC BLD AUTO-RTO: 0 /100 WBCS — SIGNIFICANT CHANGE UP (ref 0–0)
PLATELET # BLD AUTO: 306 K/UL — SIGNIFICANT CHANGE UP (ref 150–400)
POTASSIUM SERPL-MCNC: 4.3 MMOL/L — SIGNIFICANT CHANGE UP (ref 3.5–5.3)
POTASSIUM SERPL-SCNC: 4.3 MMOL/L — SIGNIFICANT CHANGE UP (ref 3.5–5.3)
RBC # BLD: 3.92 M/UL — SIGNIFICANT CHANGE UP (ref 3.8–5.2)
RBC # FLD: 12.1 % — SIGNIFICANT CHANGE UP (ref 10.3–14.5)
SODIUM SERPL-SCNC: 142 MMOL/L — SIGNIFICANT CHANGE UP (ref 135–145)
WBC # BLD: 17.78 K/UL — HIGH (ref 3.8–10.5)
WBC # FLD AUTO: 17.78 K/UL — HIGH (ref 3.8–10.5)

## 2025-04-30 PROCEDURE — 99223 1ST HOSP IP/OBS HIGH 75: CPT

## 2025-04-30 RX ORDER — OXYCODONE HYDROCHLORIDE 30 MG/1
5 TABLET ORAL EVERY 4 HOURS
Refills: 0 | Status: DISCONTINUED | OUTPATIENT
Start: 2025-04-30 | End: 2025-05-02

## 2025-04-30 RX ORDER — OXYCODONE HYDROCHLORIDE 30 MG/1
10 TABLET ORAL EVERY 4 HOURS
Refills: 0 | Status: DISCONTINUED | OUTPATIENT
Start: 2025-04-30 | End: 2025-05-02

## 2025-04-30 RX ORDER — HYDROMORPHONE/SOD CHLOR,ISO/PF 2 MG/10 ML
0.5 SYRINGE (ML) INJECTION ONCE
Refills: 0 | Status: DISCONTINUED | OUTPATIENT
Start: 2025-04-30 | End: 2025-04-30

## 2025-04-30 RX ADMIN — Medication 975 MILLIGRAM(S): at 16:27

## 2025-04-30 RX ADMIN — OXYCODONE HYDROCHLORIDE 5 MILLIGRAM(S): 30 TABLET ORAL at 18:57

## 2025-04-30 RX ADMIN — CYCLOBENZAPRINE HYDROCHLORIDE 5 MILLIGRAM(S): 15 CAPSULE, EXTENDED RELEASE ORAL at 01:31

## 2025-04-30 RX ADMIN — SODIUM CHLORIDE 1000 MILLILITER(S): 9 INJECTION, SOLUTION INTRAVENOUS at 06:37

## 2025-04-30 RX ADMIN — GABAPENTIN 100 MILLIGRAM(S): 400 CAPSULE ORAL at 13:35

## 2025-04-30 RX ADMIN — OXYCODONE HYDROCHLORIDE 5 MILLIGRAM(S): 30 TABLET ORAL at 13:35

## 2025-04-30 RX ADMIN — Medication 50 MILLIGRAM(S): at 21:59

## 2025-04-30 RX ADMIN — Medication 975 MILLIGRAM(S): at 10:52

## 2025-04-30 RX ADMIN — Medication 2 TABLET(S): at 21:59

## 2025-04-30 RX ADMIN — Medication 975 MILLIGRAM(S): at 09:52

## 2025-04-30 RX ADMIN — Medication 975 MILLIGRAM(S): at 01:31

## 2025-04-30 RX ADMIN — BUPROPION HYDROBROMIDE 200 MILLIGRAM(S): 522 TABLET, EXTENDED RELEASE ORAL at 09:52

## 2025-04-30 RX ADMIN — BUPROPION HYDROBROMIDE 200 MILLIGRAM(S): 522 TABLET, EXTENDED RELEASE ORAL at 17:58

## 2025-04-30 RX ADMIN — Medication 975 MILLIGRAM(S): at 02:30

## 2025-04-30 RX ADMIN — CYCLOBENZAPRINE HYDROCHLORIDE 5 MILLIGRAM(S): 15 CAPSULE, EXTENDED RELEASE ORAL at 16:28

## 2025-04-30 RX ADMIN — OXYCODONE HYDROCHLORIDE 5 MILLIGRAM(S): 30 TABLET ORAL at 17:57

## 2025-04-30 RX ADMIN — Medication 40 MILLIGRAM(S): at 06:38

## 2025-04-30 RX ADMIN — Medication 100 MILLIGRAM(S): at 01:32

## 2025-04-30 RX ADMIN — OXYCODONE HYDROCHLORIDE 10 MILLIGRAM(S): 30 TABLET ORAL at 21:58

## 2025-04-30 RX ADMIN — OXYCODONE HYDROCHLORIDE 10 MILLIGRAM(S): 30 TABLET ORAL at 22:40

## 2025-04-30 RX ADMIN — GABAPENTIN 100 MILLIGRAM(S): 400 CAPSULE ORAL at 06:39

## 2025-04-30 RX ADMIN — GABAPENTIN 100 MILLIGRAM(S): 400 CAPSULE ORAL at 21:59

## 2025-04-30 RX ADMIN — Medication 0.5 MILLIGRAM(S): at 19:15

## 2025-04-30 RX ADMIN — Medication 0.5 MILLIGRAM(S): at 20:15

## 2025-04-30 RX ADMIN — OXYCODONE HYDROCHLORIDE 5 MILLIGRAM(S): 30 TABLET ORAL at 14:35

## 2025-04-30 RX ADMIN — POLYETHYLENE GLYCOL 3350 17 GRAM(S): 17 POWDER, FOR SOLUTION ORAL at 13:35

## 2025-04-30 RX ADMIN — Medication 975 MILLIGRAM(S): at 17:27

## 2025-05-01 DIAGNOSIS — K59.00 CONSTIPATION, UNSPECIFIED: ICD-10-CM

## 2025-05-01 LAB
ANION GAP SERPL CALC-SCNC: 10 MMOL/L — SIGNIFICANT CHANGE UP (ref 7–14)
BASOPHILS # BLD AUTO: 0.07 K/UL — SIGNIFICANT CHANGE UP (ref 0–0.2)
BASOPHILS NFR BLD AUTO: 0.6 % — SIGNIFICANT CHANGE UP (ref 0–2)
BUN SERPL-MCNC: 12 MG/DL — SIGNIFICANT CHANGE UP (ref 7–23)
CALCIUM SERPL-MCNC: 8.7 MG/DL — SIGNIFICANT CHANGE UP (ref 8.4–10.5)
CHLORIDE SERPL-SCNC: 106 MMOL/L — SIGNIFICANT CHANGE UP (ref 98–107)
CO2 SERPL-SCNC: 25 MMOL/L — SIGNIFICANT CHANGE UP (ref 22–31)
CREAT SERPL-MCNC: 0.65 MG/DL — SIGNIFICANT CHANGE UP (ref 0.5–1.3)
EGFR: 92 ML/MIN/1.73M2 — SIGNIFICANT CHANGE UP
EGFR: 92 ML/MIN/1.73M2 — SIGNIFICANT CHANGE UP
EOSINOPHIL # BLD AUTO: 0.16 K/UL — SIGNIFICANT CHANGE UP (ref 0–0.5)
EOSINOPHIL NFR BLD AUTO: 1.3 % — SIGNIFICANT CHANGE UP (ref 0–6)
GLUCOSE SERPL-MCNC: 96 MG/DL — SIGNIFICANT CHANGE UP (ref 70–99)
HCT VFR BLD CALC: 33.5 % — LOW (ref 34.5–45)
HGB BLD-MCNC: 11 G/DL — LOW (ref 11.5–15.5)
IANC: 7.42 K/UL — HIGH (ref 1.8–7.4)
IMM GRANULOCYTES NFR BLD AUTO: 0.5 % — SIGNIFICANT CHANGE UP (ref 0–0.9)
LYMPHOCYTES # BLD AUTO: 29.4 % — SIGNIFICANT CHANGE UP (ref 13–44)
LYMPHOCYTES # BLD AUTO: 3.56 K/UL — HIGH (ref 1–3.3)
MCHC RBC-ENTMCNC: 32 PG — SIGNIFICANT CHANGE UP (ref 27–34)
MCHC RBC-ENTMCNC: 32.8 G/DL — SIGNIFICANT CHANGE UP (ref 32–36)
MCV RBC AUTO: 97.4 FL — SIGNIFICANT CHANGE UP (ref 80–100)
MONOCYTES # BLD AUTO: 0.84 K/UL — SIGNIFICANT CHANGE UP (ref 0–0.9)
MONOCYTES NFR BLD AUTO: 6.9 % — SIGNIFICANT CHANGE UP (ref 2–14)
NEUTROPHILS # BLD AUTO: 7.42 K/UL — HIGH (ref 1.8–7.4)
NEUTROPHILS NFR BLD AUTO: 61.3 % — SIGNIFICANT CHANGE UP (ref 43–77)
NRBC # BLD AUTO: 0 K/UL — SIGNIFICANT CHANGE UP (ref 0–0)
NRBC # FLD: 0 K/UL — SIGNIFICANT CHANGE UP (ref 0–0)
NRBC BLD AUTO-RTO: 0 /100 WBCS — SIGNIFICANT CHANGE UP (ref 0–0)
PLATELET # BLD AUTO: 262 K/UL — SIGNIFICANT CHANGE UP (ref 150–400)
POTASSIUM SERPL-MCNC: 4.3 MMOL/L — SIGNIFICANT CHANGE UP (ref 3.5–5.3)
POTASSIUM SERPL-SCNC: 4.3 MMOL/L — SIGNIFICANT CHANGE UP (ref 3.5–5.3)
RBC # BLD: 3.44 M/UL — LOW (ref 3.8–5.2)
RBC # FLD: 12.5 % — SIGNIFICANT CHANGE UP (ref 10.3–14.5)
SODIUM SERPL-SCNC: 141 MMOL/L — SIGNIFICANT CHANGE UP (ref 135–145)
WBC # BLD: 12.11 K/UL — HIGH (ref 3.8–10.5)
WBC # FLD AUTO: 12.11 K/UL — HIGH (ref 3.8–10.5)

## 2025-05-01 PROCEDURE — 99233 SBSQ HOSP IP/OBS HIGH 50: CPT | Mod: GC

## 2025-05-01 RX ORDER — HYDROMORPHONE/SOD CHLOR,ISO/PF 2 MG/10 ML
2 SYRINGE (ML) INJECTION ONCE
Refills: 0 | Status: DISCONTINUED | OUTPATIENT
Start: 2025-05-01 | End: 2025-05-01

## 2025-05-01 RX ADMIN — OXYCODONE HYDROCHLORIDE 10 MILLIGRAM(S): 30 TABLET ORAL at 03:07

## 2025-05-01 RX ADMIN — Medication 2 MILLIGRAM(S): at 23:41

## 2025-05-01 RX ADMIN — CYCLOBENZAPRINE HYDROCHLORIDE 5 MILLIGRAM(S): 15 CAPSULE, EXTENDED RELEASE ORAL at 18:36

## 2025-05-01 RX ADMIN — GABAPENTIN 100 MILLIGRAM(S): 400 CAPSULE ORAL at 14:00

## 2025-05-01 RX ADMIN — BUPROPION HYDROBROMIDE 200 MILLIGRAM(S): 522 TABLET, EXTENDED RELEASE ORAL at 18:36

## 2025-05-01 RX ADMIN — Medication 40 MILLIGRAM(S): at 06:56

## 2025-05-01 RX ADMIN — POLYETHYLENE GLYCOL 3350 17 GRAM(S): 17 POWDER, FOR SOLUTION ORAL at 11:06

## 2025-05-01 RX ADMIN — GABAPENTIN 100 MILLIGRAM(S): 400 CAPSULE ORAL at 21:47

## 2025-05-01 RX ADMIN — Medication 50 MILLIGRAM(S): at 21:47

## 2025-05-01 RX ADMIN — CYCLOBENZAPRINE HYDROCHLORIDE 5 MILLIGRAM(S): 15 CAPSULE, EXTENDED RELEASE ORAL at 08:45

## 2025-05-01 RX ADMIN — OXYCODONE HYDROCHLORIDE 10 MILLIGRAM(S): 30 TABLET ORAL at 06:56

## 2025-05-01 RX ADMIN — Medication 975 MILLIGRAM(S): at 01:50

## 2025-05-01 RX ADMIN — OXYCODONE HYDROCHLORIDE 10 MILLIGRAM(S): 30 TABLET ORAL at 17:04

## 2025-05-01 RX ADMIN — Medication 975 MILLIGRAM(S): at 11:07

## 2025-05-01 RX ADMIN — Medication 975 MILLIGRAM(S): at 01:01

## 2025-05-01 RX ADMIN — GABAPENTIN 100 MILLIGRAM(S): 400 CAPSULE ORAL at 06:56

## 2025-05-01 RX ADMIN — OXYCODONE HYDROCHLORIDE 10 MILLIGRAM(S): 30 TABLET ORAL at 21:30

## 2025-05-01 RX ADMIN — OXYCODONE HYDROCHLORIDE 10 MILLIGRAM(S): 30 TABLET ORAL at 11:36

## 2025-05-01 RX ADMIN — CYCLOBENZAPRINE HYDROCHLORIDE 5 MILLIGRAM(S): 15 CAPSULE, EXTENDED RELEASE ORAL at 01:01

## 2025-05-01 RX ADMIN — OXYCODONE HYDROCHLORIDE 10 MILLIGRAM(S): 30 TABLET ORAL at 07:40

## 2025-05-01 RX ADMIN — OXYCODONE HYDROCHLORIDE 10 MILLIGRAM(S): 30 TABLET ORAL at 11:06

## 2025-05-01 RX ADMIN — OXYCODONE HYDROCHLORIDE 10 MILLIGRAM(S): 30 TABLET ORAL at 02:07

## 2025-05-01 RX ADMIN — OXYCODONE HYDROCHLORIDE 10 MILLIGRAM(S): 30 TABLET ORAL at 20:35

## 2025-05-01 RX ADMIN — BUPROPION HYDROBROMIDE 200 MILLIGRAM(S): 522 TABLET, EXTENDED RELEASE ORAL at 11:07

## 2025-05-01 RX ADMIN — Medication 2 TABLET(S): at 21:47

## 2025-05-01 RX ADMIN — OXYCODONE HYDROCHLORIDE 10 MILLIGRAM(S): 30 TABLET ORAL at 16:34

## 2025-05-01 RX ADMIN — Medication 975 MILLIGRAM(S): at 18:37

## 2025-05-02 LAB
ANION GAP SERPL CALC-SCNC: 10 MMOL/L — SIGNIFICANT CHANGE UP (ref 7–14)
BUN SERPL-MCNC: 10 MG/DL — SIGNIFICANT CHANGE UP (ref 7–23)
CALCIUM SERPL-MCNC: 9 MG/DL — SIGNIFICANT CHANGE UP (ref 8.4–10.5)
CHLORIDE SERPL-SCNC: 103 MMOL/L — SIGNIFICANT CHANGE UP (ref 98–107)
CO2 SERPL-SCNC: 28 MMOL/L — SIGNIFICANT CHANGE UP (ref 22–31)
CREAT SERPL-MCNC: 0.68 MG/DL — SIGNIFICANT CHANGE UP (ref 0.5–1.3)
EGFR: 91 ML/MIN/1.73M2 — SIGNIFICANT CHANGE UP
EGFR: 91 ML/MIN/1.73M2 — SIGNIFICANT CHANGE UP
GLUCOSE SERPL-MCNC: 94 MG/DL — SIGNIFICANT CHANGE UP (ref 70–99)
POTASSIUM SERPL-MCNC: 4.4 MMOL/L — SIGNIFICANT CHANGE UP (ref 3.5–5.3)
POTASSIUM SERPL-SCNC: 4.4 MMOL/L — SIGNIFICANT CHANGE UP (ref 3.5–5.3)
SODIUM SERPL-SCNC: 141 MMOL/L — SIGNIFICANT CHANGE UP (ref 135–145)

## 2025-05-02 PROCEDURE — 99233 SBSQ HOSP IP/OBS HIGH 50: CPT | Mod: GC

## 2025-05-02 RX ORDER — SALINE 7; 19 G/118ML; G/118ML
1 ENEMA RECTAL ONCE
Refills: 0 | Status: COMPLETED | OUTPATIENT
Start: 2025-05-02 | End: 2025-05-02

## 2025-05-02 RX ORDER — HYDROMORPHONE/SOD CHLOR,ISO/PF 2 MG/10 ML
2 SYRINGE (ML) INJECTION
Refills: 0 | Status: DISCONTINUED | OUTPATIENT
Start: 2025-05-02 | End: 2025-05-03

## 2025-05-02 RX ORDER — HYDROMORPHONE/SOD CHLOR,ISO/PF 2 MG/10 ML
4 SYRINGE (ML) INJECTION
Refills: 0 | Status: DISCONTINUED | OUTPATIENT
Start: 2025-05-02 | End: 2025-05-03

## 2025-05-02 RX ADMIN — Medication 975 MILLIGRAM(S): at 09:07

## 2025-05-02 RX ADMIN — CYCLOBENZAPRINE HYDROCHLORIDE 5 MILLIGRAM(S): 15 CAPSULE, EXTENDED RELEASE ORAL at 11:05

## 2025-05-02 RX ADMIN — Medication 4 MILLIGRAM(S): at 23:58

## 2025-05-02 RX ADMIN — Medication 40 MILLIGRAM(S): at 05:04

## 2025-05-02 RX ADMIN — Medication 975 MILLIGRAM(S): at 00:44

## 2025-05-02 RX ADMIN — Medication 975 MILLIGRAM(S): at 01:44

## 2025-05-02 RX ADMIN — Medication 4 MILLIGRAM(S): at 14:00

## 2025-05-02 RX ADMIN — Medication 4 MILLIGRAM(S): at 16:09

## 2025-05-02 RX ADMIN — BUPROPION HYDROBROMIDE 200 MILLIGRAM(S): 522 TABLET, EXTENDED RELEASE ORAL at 17:18

## 2025-05-02 RX ADMIN — Medication 4 MILLIGRAM(S): at 20:42

## 2025-05-02 RX ADMIN — OXYCODONE HYDROCHLORIDE 10 MILLIGRAM(S): 30 TABLET ORAL at 10:00

## 2025-05-02 RX ADMIN — Medication 4 MILLIGRAM(S): at 22:58

## 2025-05-02 RX ADMIN — Medication 4 MILLIGRAM(S): at 17:20

## 2025-05-02 RX ADMIN — Medication 2 MILLIGRAM(S): at 00:41

## 2025-05-02 RX ADMIN — Medication 4 MILLIGRAM(S): at 13:07

## 2025-05-02 RX ADMIN — OXYCODONE HYDROCHLORIDE 10 MILLIGRAM(S): 30 TABLET ORAL at 09:07

## 2025-05-02 RX ADMIN — BUPROPION HYDROBROMIDE 200 MILLIGRAM(S): 522 TABLET, EXTENDED RELEASE ORAL at 11:08

## 2025-05-02 RX ADMIN — GABAPENTIN 100 MILLIGRAM(S): 400 CAPSULE ORAL at 21:08

## 2025-05-02 RX ADMIN — Medication 2 TABLET(S): at 21:08

## 2025-05-02 RX ADMIN — OXYCODONE HYDROCHLORIDE 10 MILLIGRAM(S): 30 TABLET ORAL at 05:04

## 2025-05-02 RX ADMIN — SALINE 1 ENEMA: 7; 19 ENEMA RECTAL at 12:30

## 2025-05-02 RX ADMIN — Medication 50 MILLIGRAM(S): at 21:47

## 2025-05-02 RX ADMIN — Medication 4 MILLIGRAM(S): at 19:42

## 2025-05-02 RX ADMIN — OXYCODONE HYDROCHLORIDE 10 MILLIGRAM(S): 30 TABLET ORAL at 06:04

## 2025-05-02 RX ADMIN — OXYCODONE HYDROCHLORIDE 10 MILLIGRAM(S): 30 TABLET ORAL at 00:44

## 2025-05-02 RX ADMIN — GABAPENTIN 100 MILLIGRAM(S): 400 CAPSULE ORAL at 06:22

## 2025-05-02 RX ADMIN — Medication 975 MILLIGRAM(S): at 17:18

## 2025-05-02 RX ADMIN — Medication 975 MILLIGRAM(S): at 18:17

## 2025-05-02 RX ADMIN — POLYETHYLENE GLYCOL 3350 17 GRAM(S): 17 POWDER, FOR SOLUTION ORAL at 11:05

## 2025-05-02 RX ADMIN — CYCLOBENZAPRINE HYDROCHLORIDE 5 MILLIGRAM(S): 15 CAPSULE, EXTENDED RELEASE ORAL at 20:56

## 2025-05-02 RX ADMIN — OXYCODONE HYDROCHLORIDE 10 MILLIGRAM(S): 30 TABLET ORAL at 01:44

## 2025-05-02 RX ADMIN — Medication 975 MILLIGRAM(S): at 10:00

## 2025-05-03 ENCOUNTER — TRANSCRIPTION ENCOUNTER (OUTPATIENT)
Age: 75
End: 2025-05-03

## 2025-05-03 VITALS
RESPIRATION RATE: 19 BRPM | SYSTOLIC BLOOD PRESSURE: 119 MMHG | OXYGEN SATURATION: 95 % | TEMPERATURE: 98 F | HEART RATE: 79 BPM | DIASTOLIC BLOOD PRESSURE: 58 MMHG

## 2025-05-03 PROCEDURE — 99232 SBSQ HOSP IP/OBS MODERATE 35: CPT

## 2025-05-03 RX ORDER — ACETAMINOPHEN 500 MG/5ML
3 LIQUID (ML) ORAL
Qty: 0 | Refills: 0 | DISCHARGE
Start: 2025-05-03

## 2025-05-03 RX ORDER — HYDROMORPHONE/SOD CHLOR,ISO/PF 2 MG/10 ML
1 SYRINGE (ML) INJECTION
Qty: 24 | Refills: 0
Start: 2025-05-03

## 2025-05-03 RX ORDER — GABAPENTIN 400 MG/1
1 CAPSULE ORAL
Qty: 42 | Refills: 0
Start: 2025-05-03

## 2025-05-03 RX ORDER — POLYETHYLENE GLYCOL 3350 17 G/17G
17 POWDER, FOR SOLUTION ORAL
Qty: 0 | Refills: 0 | DISCHARGE
Start: 2025-05-03

## 2025-05-03 RX ORDER — HYDROMORPHONE/SOD CHLOR,ISO/PF 2 MG/10 ML
1 SYRINGE (ML) INJECTION
Qty: 48 | Refills: 0
Start: 2025-05-03

## 2025-05-03 RX ORDER — TRAMADOL HYDROCHLORIDE 50 MG/1
1 TABLET, FILM COATED ORAL
Qty: 42 | Refills: 0
Start: 2025-05-03

## 2025-05-03 RX ORDER — CYCLOBENZAPRINE HYDROCHLORIDE 15 MG/1
1 CAPSULE, EXTENDED RELEASE ORAL
Qty: 42 | Refills: 0
Start: 2025-05-03

## 2025-05-03 RX ORDER — NALOXONE HYDROCHLORIDE 0.4 MG/ML
1 INJECTION, SOLUTION INTRAMUSCULAR; INTRAVENOUS; SUBCUTANEOUS
Qty: 1 | Refills: 0
Start: 2025-05-03

## 2025-05-03 RX ADMIN — Medication 4 MILLIGRAM(S): at 12:34

## 2025-05-03 RX ADMIN — Medication 4 MILLIGRAM(S): at 13:34

## 2025-05-03 RX ADMIN — Medication 4 MILLIGRAM(S): at 06:45

## 2025-05-03 RX ADMIN — Medication 4 MILLIGRAM(S): at 09:00

## 2025-05-03 RX ADMIN — POLYETHYLENE GLYCOL 3350 17 GRAM(S): 17 POWDER, FOR SOLUTION ORAL at 12:34

## 2025-05-03 RX ADMIN — Medication 975 MILLIGRAM(S): at 01:00

## 2025-05-03 RX ADMIN — Medication 975 MILLIGRAM(S): at 10:00

## 2025-05-03 RX ADMIN — CYCLOBENZAPRINE HYDROCHLORIDE 5 MILLIGRAM(S): 15 CAPSULE, EXTENDED RELEASE ORAL at 09:00

## 2025-05-03 RX ADMIN — GABAPENTIN 100 MILLIGRAM(S): 400 CAPSULE ORAL at 05:45

## 2025-05-03 RX ADMIN — GABAPENTIN 100 MILLIGRAM(S): 400 CAPSULE ORAL at 15:20

## 2025-05-03 RX ADMIN — Medication 975 MILLIGRAM(S): at 09:00

## 2025-05-03 RX ADMIN — BUPROPION HYDROBROMIDE 200 MILLIGRAM(S): 522 TABLET, EXTENDED RELEASE ORAL at 09:00

## 2025-05-03 RX ADMIN — Medication 4 MILLIGRAM(S): at 05:45

## 2025-05-03 RX ADMIN — Medication 975 MILLIGRAM(S): at 02:00

## 2025-05-03 RX ADMIN — Medication 4 MILLIGRAM(S): at 03:06

## 2025-05-03 RX ADMIN — Medication 40 MILLIGRAM(S): at 05:45

## 2025-05-03 RX ADMIN — Medication 4 MILLIGRAM(S): at 02:06

## 2025-05-03 RX ADMIN — Medication 4 MILLIGRAM(S): at 10:00

## 2025-05-06 ENCOUNTER — NON-APPOINTMENT (OUTPATIENT)
Age: 75
End: 2025-05-06

## 2025-05-08 LAB — SURGICAL PATHOLOGY STUDY: SIGNIFICANT CHANGE UP

## 2025-05-12 ENCOUNTER — APPOINTMENT (OUTPATIENT)
Dept: ORTHOPEDIC SURGERY | Facility: CLINIC | Age: 75
End: 2025-05-12
Payer: MEDICARE

## 2025-05-12 VITALS — BODY MASS INDEX: 27.32 KG/M2 | HEIGHT: 65 IN | WEIGHT: 164 LBS

## 2025-05-12 DIAGNOSIS — M48.07 SPINAL STENOSIS, LUMBOSACRAL REGION: ICD-10-CM

## 2025-05-12 PROCEDURE — 72100 X-RAY EXAM L-S SPINE 2/3 VWS: CPT

## 2025-05-12 PROCEDURE — 99024 POSTOP FOLLOW-UP VISIT: CPT

## 2025-05-12 RX ORDER — HYDROMORPHONE HYDROCHLORIDE 4 MG/1
4 TABLET ORAL EVERY 4 HOURS
Qty: 28 | Refills: 0 | Status: ACTIVE | COMMUNITY
Start: 2025-05-05 | End: 1900-01-01

## 2025-05-12 RX ORDER — CYCLOBENZAPRINE HYDROCHLORIDE 5 MG/1
5 TABLET, FILM COATED ORAL 3 TIMES DAILY
Qty: 21 | Refills: 0 | Status: ACTIVE | COMMUNITY
Start: 2025-05-12 | End: 1900-01-01

## 2025-05-20 NOTE — H&P PST ADULT - ADMIT DATE
06-Sep-2018 Consent: The patient's consent was obtained including but not limited to risks of crusting, scabbing, blistering, scarring, darker or lighter pigmentary change, recurrence, incomplete removal and infection. Medical Necessity Information: It is in your best interest to select a reason for this procedure from the list below. All of these items fulfill various CMS LCD requirements except the new and changing color options. Render Note In Bullet Format When Appropriate: No Medical Necessity Clause: This procedure was medically necessary because the lesions that were treated were: Spray Paint Text: The liquid nitrogen was applied to the skin utilizing a spray paint frosting technique. Number Of Freeze-Thaw Cycles: 1 freeze-thaw cycle Show Applicator Variable?: Yes Detail Level: Detailed Post-Care Instructions: I reviewed with the patient in detail post-care instructions. Patient is to wear sunprotection, and avoid picking at any of the treated lesions. Pt may apply Vaseline to crusted or scabbing areas.

## 2025-06-09 ENCOUNTER — APPOINTMENT (OUTPATIENT)
Dept: ORTHOPEDIC SURGERY | Facility: CLINIC | Age: 75
End: 2025-06-09
Payer: MEDICARE

## 2025-06-09 VITALS — HEIGHT: 65 IN | WEIGHT: 160 LBS | BODY MASS INDEX: 26.66 KG/M2

## 2025-06-09 PROCEDURE — 99024 POSTOP FOLLOW-UP VISIT: CPT

## 2025-06-18 ENCOUNTER — APPOINTMENT (OUTPATIENT)
Dept: OBGYN | Facility: CLINIC | Age: 75
End: 2025-06-18
Payer: MEDICARE

## 2025-06-18 PROCEDURE — 64566 NEUROELTRD STIM POST TIBIAL: CPT

## 2025-06-18 PROCEDURE — 99213 OFFICE O/P EST LOW 20 MIN: CPT | Mod: 25

## 2025-06-20 LAB — BACTERIA UR CULT: NORMAL

## 2025-06-25 ENCOUNTER — APPOINTMENT (OUTPATIENT)
Dept: FAMILY MEDICINE | Facility: CLINIC | Age: 75
End: 2025-06-25
Payer: MEDICARE

## 2025-06-25 VITALS
SYSTOLIC BLOOD PRESSURE: 150 MMHG | DIASTOLIC BLOOD PRESSURE: 86 MMHG | HEART RATE: 80 BPM | HEIGHT: 64 IN | TEMPERATURE: 97.3 F | OXYGEN SATURATION: 96 % | WEIGHT: 163 LBS | RESPIRATION RATE: 16 BRPM | BODY MASS INDEX: 27.83 KG/M2

## 2025-06-25 PROBLEM — Z00.00 ANNUAL PHYSICAL EXAM: Status: ACTIVE | Noted: 2025-06-25

## 2025-06-25 PROCEDURE — G0439: CPT

## 2025-06-29 LAB
ALBUMIN SERPL ELPH-MCNC: 4.3 G/DL
ALP BLD-CCNC: 98 U/L
ALT SERPL-CCNC: 24 U/L
ANION GAP SERPL CALC-SCNC: 13 MMOL/L
AST SERPL-CCNC: 18 U/L
BASOPHILS # BLD AUTO: 0.05 K/UL
BASOPHILS NFR BLD AUTO: 0.5 %
BILIRUB SERPL-MCNC: 0.4 MG/DL
BUN SERPL-MCNC: 14 MG/DL
CALCIUM SERPL-MCNC: 9.5 MG/DL
CHLORIDE SERPL-SCNC: 108 MMOL/L
CHOLEST SERPL-MCNC: 187 MG/DL
CO2 SERPL-SCNC: 22 MMOL/L
CREAT SERPL-MCNC: 0.61 MG/DL
EGFRCR SERPLBLD CKD-EPI 2021: 94 ML/MIN/1.73M2
EOSINOPHIL # BLD AUTO: 0.12 K/UL
EOSINOPHIL NFR BLD AUTO: 1.3 %
GLUCOSE SERPL-MCNC: 127 MG/DL
HCT VFR BLD CALC: 40.8 %
HDLC SERPL-MCNC: 66 MG/DL
HGB BLD-MCNC: 13.2 G/DL
IMM GRANULOCYTES NFR BLD AUTO: 0.4 %
LDLC SERPL-MCNC: 85 MG/DL
LYMPHOCYTES # BLD AUTO: 2.97 K/UL
LYMPHOCYTES NFR BLD AUTO: 32.6 %
MAN DIFF?: NORMAL
MCHC RBC-ENTMCNC: 30.1 PG
MCHC RBC-ENTMCNC: 32.4 G/DL
MCV RBC AUTO: 92.9 FL
MONOCYTES # BLD AUTO: 0.61 K/UL
MONOCYTES NFR BLD AUTO: 6.7 %
NEUTROPHILS # BLD AUTO: 5.32 K/UL
NEUTROPHILS NFR BLD AUTO: 58.5 %
NONHDLC SERPL-MCNC: 120 MG/DL
PLATELET # BLD AUTO: 340 K/UL
POTASSIUM SERPL-SCNC: 4.3 MMOL/L
PROT SERPL-MCNC: 6.5 G/DL
RBC # BLD: 4.39 M/UL
RBC # FLD: 12.8 %
SODIUM SERPL-SCNC: 143 MMOL/L
TRIGL SERPL-MCNC: 210 MG/DL
TSH SERPL-ACNC: 3.14 UIU/ML
WBC # FLD AUTO: 9.11 K/UL

## 2025-06-30 PROBLEM — R73.9 HYPERGLYCEMIA: Status: ACTIVE | Noted: 2025-06-30

## 2025-07-07 ENCOUNTER — NON-APPOINTMENT (OUTPATIENT)
Age: 75
End: 2025-07-07

## 2025-07-07 ENCOUNTER — APPOINTMENT (OUTPATIENT)
Dept: OBGYN | Facility: CLINIC | Age: 75
End: 2025-07-07

## 2025-07-16 ENCOUNTER — NON-APPOINTMENT (OUTPATIENT)
Age: 75
End: 2025-07-16

## 2025-07-16 ENCOUNTER — APPOINTMENT (OUTPATIENT)
Dept: OBGYN | Facility: CLINIC | Age: 75
End: 2025-07-16
Payer: MEDICARE

## 2025-07-16 VITALS
BODY MASS INDEX: 27.31 KG/M2 | WEIGHT: 160 LBS | HEIGHT: 64 IN | SYSTOLIC BLOOD PRESSURE: 138 MMHG | HEART RATE: 76 BPM | DIASTOLIC BLOOD PRESSURE: 74 MMHG

## 2025-07-16 PROCEDURE — 64566 NEUROELTRD STIM POST TIBIAL: CPT

## 2025-07-16 PROCEDURE — 99213 OFFICE O/P EST LOW 20 MIN: CPT | Mod: 25

## 2025-07-16 PROCEDURE — 99459 PELVIC EXAMINATION: CPT

## 2025-07-17 LAB
APPEARANCE: CLEAR
BACTERIA: NEGATIVE /HPF
BILIRUB UR QL STRIP: NEGATIVE
BILIRUBIN URINE: NEGATIVE
BLOOD URINE: NEGATIVE
CAST: 0 /LPF
CLARITY UR: CLEAR
COLLECTION METHOD: NORMAL
COLOR: YELLOW
EPITHELIAL CELLS: 2 /HPF
GLUCOSE QUALITATIVE U: NEGATIVE MG/DL
GLUCOSE UR-MCNC: NEGATIVE
HCG UR QL: 0.2 EU/DL
HGB UR QL STRIP.AUTO: NEGATIVE
KETONES UR-MCNC: NEGATIVE
KETONES URINE: NEGATIVE MG/DL
LEUKOCYTE ESTERASE UR QL STRIP: NEGATIVE
LEUKOCYTE ESTERASE URINE: NEGATIVE
MICROSCOPIC-UA: NORMAL
NITRITE UR QL STRIP: NEGATIVE
NITRITE URINE: NEGATIVE
PH UR STRIP: 5.5
PH URINE: 5.5
PROT UR STRIP-MCNC: NEGATIVE
PROTEIN URINE: NEGATIVE MG/DL
RED BLOOD CELLS URINE: 2 /HPF
SP GR UR STRIP: 1.03
SPECIFIC GRAVITY URINE: 1.02
UROBILINOGEN URINE: 0.2 MG/DL
WHITE BLOOD CELLS URINE: 0 /HPF

## 2025-07-18 LAB
BACTERIA UR CULT: NORMAL
URINE CYTOLOGY: NORMAL

## 2025-08-14 ENCOUNTER — APPOINTMENT (OUTPATIENT)
Dept: ULTRASOUND IMAGING | Facility: IMAGING CENTER | Age: 75
End: 2025-08-14
Payer: MEDICARE

## 2025-08-14 ENCOUNTER — RESULT REVIEW (OUTPATIENT)
Age: 75
End: 2025-08-14

## 2025-08-14 ENCOUNTER — APPOINTMENT (OUTPATIENT)
Dept: MAMMOGRAPHY | Facility: IMAGING CENTER | Age: 75
End: 2025-08-14
Payer: MEDICARE

## 2025-08-14 ENCOUNTER — APPOINTMENT (OUTPATIENT)
Dept: RADIOLOGY | Facility: IMAGING CENTER | Age: 75
End: 2025-08-14
Payer: MEDICARE

## 2025-08-14 ENCOUNTER — APPOINTMENT (OUTPATIENT)
Dept: ORTHOPEDIC SURGERY | Facility: CLINIC | Age: 75
End: 2025-08-14
Payer: MEDICARE

## 2025-08-14 ENCOUNTER — OUTPATIENT (OUTPATIENT)
Dept: OUTPATIENT SERVICES | Facility: HOSPITAL | Age: 75
LOS: 1 days | End: 2025-08-14
Payer: MEDICARE

## 2025-08-14 VITALS — BODY MASS INDEX: 26.33 KG/M2 | HEIGHT: 65 IN | WEIGHT: 158 LBS

## 2025-08-14 DIAGNOSIS — Z12.39 ENCOUNTER FOR OTHER SCREENING FOR MALIGNANT NEOPLASM OF BREAST: ICD-10-CM

## 2025-08-14 DIAGNOSIS — Z98.890 OTHER SPECIFIED POSTPROCEDURAL STATES: Chronic | ICD-10-CM

## 2025-08-14 DIAGNOSIS — M48.07 SPINAL STENOSIS, LUMBOSACRAL REGION: ICD-10-CM

## 2025-08-14 DIAGNOSIS — N32.81 OVERACTIVE BLADDER: ICD-10-CM

## 2025-08-14 DIAGNOSIS — Z00.00 ENCOUNTER FOR GENERAL ADULT MEDICAL EXAMINATION WITHOUT ABNORMAL FINDINGS: ICD-10-CM

## 2025-08-14 PROCEDURE — 77063 BREAST TOMOSYNTHESIS BI: CPT | Mod: 26

## 2025-08-14 PROCEDURE — 77080 DXA BONE DENSITY AXIAL: CPT | Mod: 26

## 2025-08-14 PROCEDURE — 77067 SCR MAMMO BI INCL CAD: CPT

## 2025-08-14 PROCEDURE — 77080 DXA BONE DENSITY AXIAL: CPT

## 2025-08-14 PROCEDURE — 76641 ULTRASOUND BREAST COMPLETE: CPT

## 2025-08-14 PROCEDURE — 76641 ULTRASOUND BREAST COMPLETE: CPT | Mod: 26,50,GA

## 2025-08-14 PROCEDURE — 77067 SCR MAMMO BI INCL CAD: CPT | Mod: 26

## 2025-08-14 PROCEDURE — 99214 OFFICE O/P EST MOD 30 MIN: CPT

## 2025-08-14 PROCEDURE — 76770 US EXAM ABDO BACK WALL COMP: CPT

## 2025-08-14 PROCEDURE — 76770 US EXAM ABDO BACK WALL COMP: CPT | Mod: 26

## 2025-08-14 PROCEDURE — 77063 BREAST TOMOSYNTHESIS BI: CPT

## 2025-08-14 RX ORDER — TIZANIDINE 4 MG/1
4 TABLET ORAL
Qty: 90 | Refills: 0 | Status: ACTIVE | COMMUNITY
Start: 2025-08-14 | End: 1900-01-01

## 2025-08-14 RX ORDER — CYCLOBENZAPRINE HYDROCHLORIDE 5 MG/1
5 TABLET, FILM COATED ORAL
Qty: 14 | Refills: 0 | Status: ACTIVE | COMMUNITY
Start: 2025-08-14 | End: 1900-01-01

## 2025-08-14 RX ORDER — DICLOFENAC SODIUM 75 MG/1
75 TABLET, DELAYED RELEASE ORAL
Qty: 60 | Refills: 1 | Status: ACTIVE | COMMUNITY
Start: 2025-08-14 | End: 1900-01-01

## 2025-08-20 ENCOUNTER — APPOINTMENT (OUTPATIENT)
Dept: OBGYN | Facility: CLINIC | Age: 75
End: 2025-08-20

## 2025-08-20 ENCOUNTER — NON-APPOINTMENT (OUTPATIENT)
Age: 75
End: 2025-08-20

## 2025-08-29 ENCOUNTER — NON-APPOINTMENT (OUTPATIENT)
Age: 75
End: 2025-08-29

## 2025-08-29 ENCOUNTER — OUTPATIENT (OUTPATIENT)
Dept: OUTPATIENT SERVICES | Facility: HOSPITAL | Age: 75
LOS: 1 days | End: 2025-08-29
Payer: MEDICARE

## 2025-08-29 ENCOUNTER — APPOINTMENT (OUTPATIENT)
Dept: ULTRASOUND IMAGING | Facility: HOSPITAL | Age: 75
End: 2025-08-29
Payer: MEDICARE

## 2025-08-29 ENCOUNTER — APPOINTMENT (OUTPATIENT)
Dept: FAMILY MEDICINE | Facility: CLINIC | Age: 75
End: 2025-08-29
Payer: MEDICARE

## 2025-08-29 ENCOUNTER — APPOINTMENT (OUTPATIENT)
Dept: RADIOLOGY | Facility: HOSPITAL | Age: 75
End: 2025-08-29
Payer: MEDICARE

## 2025-08-29 DIAGNOSIS — M79.676 PAIN IN UNSPECIFIED TOE(S): ICD-10-CM

## 2025-08-29 DIAGNOSIS — R60.0 LOCALIZED EDEMA: ICD-10-CM

## 2025-08-29 DIAGNOSIS — Z98.890 OTHER SPECIFIED POSTPROCEDURAL STATES: Chronic | ICD-10-CM

## 2025-08-29 DIAGNOSIS — Z96.659 PRESENCE OF UNSPECIFIED ARTIFICIAL KNEE JOINT: Chronic | ICD-10-CM

## 2025-08-29 PROCEDURE — 93971 EXTREMITY STUDY: CPT | Mod: 26,LT

## 2025-08-29 PROCEDURE — 93971 EXTREMITY STUDY: CPT

## 2025-08-29 PROCEDURE — 73630 X-RAY EXAM OF FOOT: CPT | Mod: 26,50

## 2025-08-29 PROCEDURE — 73630 X-RAY EXAM OF FOOT: CPT

## 2025-08-29 PROCEDURE — 99214 OFFICE O/P EST MOD 30 MIN: CPT

## 2025-08-29 RX ORDER — INDOMETHACIN 25 MG/1
25 CAPSULE ORAL 3 TIMES DAILY
Qty: 21 | Refills: 2 | Status: ACTIVE | COMMUNITY
Start: 2025-08-29 | End: 1900-01-01

## 2025-09-02 ENCOUNTER — APPOINTMENT (OUTPATIENT)
Dept: FAMILY MEDICINE | Facility: CLINIC | Age: 75
End: 2025-09-02
Payer: MEDICARE

## 2025-09-02 VITALS — DIASTOLIC BLOOD PRESSURE: 90 MMHG | SYSTOLIC BLOOD PRESSURE: 140 MMHG

## 2025-09-02 VITALS
DIASTOLIC BLOOD PRESSURE: 92 MMHG | OXYGEN SATURATION: 94 % | SYSTOLIC BLOOD PRESSURE: 164 MMHG | HEIGHT: 65 IN | BODY MASS INDEX: 26.33 KG/M2 | HEART RATE: 85 BPM | WEIGHT: 158 LBS

## 2025-09-02 DIAGNOSIS — R60.0 LOCALIZED EDEMA: ICD-10-CM

## 2025-09-02 DIAGNOSIS — R05.3 CHRONIC COUGH: ICD-10-CM

## 2025-09-02 PROCEDURE — 36415 COLL VENOUS BLD VENIPUNCTURE: CPT

## 2025-09-02 PROCEDURE — 99214 OFFICE O/P EST MOD 30 MIN: CPT

## 2025-09-02 PROCEDURE — G2211 COMPLEX E/M VISIT ADD ON: CPT

## 2025-09-07 PROBLEM — R05.3 CHRONIC COUGH: Status: ACTIVE | Noted: 2025-09-02

## 2025-09-09 ENCOUNTER — NON-APPOINTMENT (OUTPATIENT)
Age: 75
End: 2025-09-09

## 2025-09-10 ENCOUNTER — APPOINTMENT (OUTPATIENT)
Dept: OBGYN | Facility: CLINIC | Age: 75
End: 2025-09-10

## 2025-09-11 ENCOUNTER — TRANSCRIPTION ENCOUNTER (OUTPATIENT)
Age: 75
End: 2025-09-11

## 2025-09-12 ENCOUNTER — TRANSCRIPTION ENCOUNTER (OUTPATIENT)
Age: 75
End: 2025-09-12

## 2025-09-15 RX ORDER — HYDROMORPHONE HYDROCHLORIDE 2 MG/1
2 TABLET ORAL EVERY 6 HOURS
Refills: 0 | Status: ACTIVE | COMMUNITY
Start: 2025-09-15

## 2025-09-18 ENCOUNTER — APPOINTMENT (OUTPATIENT)
Dept: SURGERY | Facility: CLINIC | Age: 75
End: 2025-09-18

## 2025-09-18 VITALS
SYSTOLIC BLOOD PRESSURE: 144 MMHG | DIASTOLIC BLOOD PRESSURE: 78 MMHG | WEIGHT: 153 LBS | HEIGHT: 65 IN | BODY MASS INDEX: 25.49 KG/M2

## 2025-09-18 DIAGNOSIS — Z12.11 ENCOUNTER FOR SCREENING FOR MALIGNANT NEOPLASM OF COLON: ICD-10-CM

## 2025-09-22 RX ORDER — SODIUM SULFATE, POTASSIUM SULFATE AND MAGNESIUM SULFATE 1.6; 3.13; 17.5 G/177ML; G/177ML; G/177ML
17.5-3.13-1.6 SOLUTION ORAL
Qty: 1 | Refills: 0 | Status: DISCONTINUED | COMMUNITY
Start: 2025-09-18 | End: 2025-09-22

## 2025-09-27 PROBLEM — Z09 HOSPITAL DISCHARGE FOLLOW-UP: Status: ACTIVE | Noted: 2025-09-22

## 2025-09-27 PROBLEM — K57.92 ACUTE DIVERTICULITIS: Status: ACTIVE | Noted: 2025-09-22

## (undated) DEVICE — SOL IRR POUR NS 0.9% 500ML

## (undated) DEVICE — POSITIONER FOAM EGG CRATE ULNAR 2PCS (PINK)

## (undated) DEVICE — DRSG CURITY GAUZE SPONGE 4 X 4" 12-PLY

## (undated) DEVICE — DRAPE SURGICAL #1010

## (undated) DEVICE — FOLEY TRAY 14FR 5CC LF UMETER CLOSED

## (undated) DEVICE — FOLEY TRAY 16FR 5CC LF UMETER CLOSED

## (undated) DEVICE — KNIFE LIGAMENT RETROGRADE

## (undated) DEVICE — DRAIN BLAKE 15FR BARD CHANNEL

## (undated) DEVICE — SPONGE DISSECTOR PEANUT

## (undated) DEVICE — PACK NEURO

## (undated) DEVICE — SUT NYLON 2-0 18" FS

## (undated) DEVICE — SUT STRATAFIX SPIRAL MONOCRYL PLUS 3-0 19" PS-2 UNDYED

## (undated) DEVICE — SUT VICRYL PLUS 2-0 27" FS-1 UNDYED

## (undated) DEVICE — ELCTR 4-DISC 20MM 49" (RED, BLUE, GREEN, BLACK)

## (undated) DEVICE — DRSG COBAN 2" LF STERILE

## (undated) DEVICE — MIDAS REX LEGEND BALL FLUTED SM BORE 3.0MM X 10CM

## (undated) DEVICE — SUT MONOCRYL 3-0 27" PS-2 UNDYED

## (undated) DEVICE — PACK HAND TRAY

## (undated) DEVICE — BIPOLAR FORCEP STRYKER STANDARD 9" X 1MM (YELLOW)

## (undated) DEVICE — ELCTR BOVIE PENCIL BLADE 10FT

## (undated) DEVICE — Device

## (undated) DEVICE — GLV 8 PROTEXIS (WHITE)

## (undated) DEVICE — SOL IRR POUR H2O 1500ML

## (undated) DEVICE — DURABLE MEDICAL EQUIPMENT: Type: DURABLE MEDICAL EQUIPMENT

## (undated) DEVICE — POSITIONER STRAP ARMBOARD VELCRO TS-30

## (undated) DEVICE — VENODYNE/SCD SLEEVE CALF MEDIUM

## (undated) DEVICE — DRSG DERMABOND 0.7ML

## (undated) DEVICE — SUT VICRYL PLUS 0 27" OS-6 UNDYED

## (undated) DEVICE — SUT STRATAFIX SPIRAL PDS PLUS 1 18" OS-8

## (undated) DEVICE — DRSG BENZOIN 0.6CC

## (undated) DEVICE — ELCTR SUBDERMAL NDL CLASSIC 1.5M X 59" (6 COLOR)

## (undated) DEVICE — POSITIONER CUSHION INSERT PRONE VIEW LG

## (undated) DEVICE — ELCTR SUBDERMAL NDL 27G X 1/2" WITH TWISTED PAIR

## (undated) DEVICE — SUT VICRYL 1 36" CTX UNDYED

## (undated) DEVICE — MIDAS REX MR7 LUBRICANT DIFFUSER CARTRIDGE

## (undated) DEVICE — SPONGE X-RAY 4X8"

## (undated) DEVICE — TAPE SILK 3"

## (undated) DEVICE — SOL ANTI FOG

## (undated) DEVICE — WARMING BLANKET FULL ADULT

## (undated) DEVICE — DRSG DERMABOND PRINEO 42CM

## (undated) DEVICE — MIDAS REX LEGEND BALL FLUTED SM BORE 4.0MM X 10CM

## (undated) DEVICE — GOWN XL

## (undated) DEVICE — DRSG TEGADERM 4 X 4.75"

## (undated) DEVICE — DRSG STERISTRIPS 0.5 X 4"

## (undated) DEVICE — LIJ-KMEDIC KERRISON RONGUER: Type: DURABLE MEDICAL EQUIPMENT

## (undated) DEVICE — SUT SILK 2-0 18" FS

## (undated) DEVICE — DRAPE LIGHT HANDLE COVER (GREEN)

## (undated) DEVICE — WARMING BLANKET LOWER ADULT

## (undated) DEVICE — DRAPE 3/4 SHEET 52X76"

## (undated) DEVICE — NDL HYPO SAFE 21G X 1.5" (GREEN)

## (undated) DEVICE — SOL IRR POUR NS 0.9% 1500ML

## (undated) DEVICE — DRSG TELFA 3 X 8

## (undated) DEVICE — DRAIN JACKSON PRATT 3 SPRING RESERVOIR W 10FR PVC DRAIN

## (undated) DEVICE — SUT PDS II 0 36" CT-1

## (undated) DEVICE — ELCTR BOVIE PENCIL SMOKE EVACUATION

## (undated) DEVICE — PREP DURAPREP 26CC

## (undated) DEVICE — PREP SCRUB BRUSH W CHG 4%

## (undated) DEVICE — SUT MONOCRYL 5-0 18" P-3 UNDYED

## (undated) DEVICE — DRAPE BACK TABLE COVER 44X90"

## (undated) DEVICE — DRAIN RESERVOIR FOR JACKSON PRATT 100CC CARDINAL

## (undated) DEVICE — ELCTR GROUNDING PAD ADULT COVIDIEN

## (undated) DEVICE — NDL SPINAL 18G X 3.5" (PINK)

## (undated) DEVICE — TOURNIQUET CUFF 18" DUAL PORT SINGLE BLADDER LUER LOCK (BLACK)